# Patient Record
Sex: MALE | Race: WHITE | Employment: FULL TIME
[De-identification: names, ages, dates, MRNs, and addresses within clinical notes are randomized per-mention and may not be internally consistent; named-entity substitution may affect disease eponyms.]

---

## 2017-03-06 ENCOUNTER — OFFICE VISIT (OUTPATIENT)
Dept: FAMILY MEDICINE CLINIC | Facility: CLINIC | Age: 52
End: 2017-03-06

## 2017-03-06 VITALS
BODY MASS INDEX: 44.1 KG/M2 | TEMPERATURE: 98.3 F | DIASTOLIC BLOOD PRESSURE: 64 MMHG | HEART RATE: 98 BPM | WEIGHT: 315 LBS | HEIGHT: 71 IN | SYSTOLIC BLOOD PRESSURE: 132 MMHG

## 2017-03-06 DIAGNOSIS — E66.01 MORBID OBESITY WITH BMI OF 50.0-59.9, ADULT (HCC): ICD-10-CM

## 2017-03-06 DIAGNOSIS — E11.9 TYPE 2 DIABETES MELLITUS WITHOUT COMPLICATION, WITHOUT LONG-TERM CURRENT USE OF INSULIN (HCC): Primary | ICD-10-CM

## 2017-03-06 DIAGNOSIS — J06.9 UPPER RESPIRATORY TRACT INFECTION, UNSPECIFIED TYPE: ICD-10-CM

## 2017-03-06 DIAGNOSIS — I10 ESSENTIAL HYPERTENSION: ICD-10-CM

## 2017-03-06 LAB
GLUCOSE BLD-MCNC: 200 MG/DL
HBA1C MFR BLD: 7.2 %

## 2017-03-06 PROCEDURE — 99214 OFFICE O/P EST MOD 30 MIN: CPT | Performed by: FAMILY MEDICINE

## 2017-03-06 PROCEDURE — 82962 GLUCOSE BLOOD TEST: CPT | Performed by: FAMILY MEDICINE

## 2017-03-06 PROCEDURE — 83036 HEMOGLOBIN GLYCOSYLATED A1C: CPT | Performed by: FAMILY MEDICINE

## 2017-03-06 RX ORDER — FLUTICASONE PROPIONATE 50 MCG
2 SPRAY, SUSPENSION (ML) NASAL DAILY
Qty: 1 BOTTLE | Refills: 0 | Status: SHIPPED | OUTPATIENT
Start: 2017-03-06 | End: 2017-11-27 | Stop reason: ALTCHOICE

## 2017-03-06 RX ORDER — ALBUTEROL SULFATE 90 UG/1
2 AEROSOL, METERED RESPIRATORY (INHALATION) 2 TIMES DAILY
Qty: 1 INHALER | Refills: 1 | Status: SHIPPED | OUTPATIENT
Start: 2017-03-06 | End: 2017-04-10 | Stop reason: ALTCHOICE

## 2017-03-06 ASSESSMENT — ENCOUNTER SYMPTOMS
SHORTNESS OF BREATH: 0
NAUSEA: 0
SORE THROAT: 0
WHEEZING: 1
ABDOMINAL PAIN: 0
COUGH: 1

## 2017-03-06 ASSESSMENT — PATIENT HEALTH QUESTIONNAIRE - PHQ9
2. FEELING DOWN, DEPRESSED OR HOPELESS: 0
SUM OF ALL RESPONSES TO PHQ9 QUESTIONS 1 & 2: 0
SUM OF ALL RESPONSES TO PHQ QUESTIONS 1-9: 0
1. LITTLE INTEREST OR PLEASURE IN DOING THINGS: 0

## 2017-03-10 DIAGNOSIS — I10 UNSPECIFIED ESSENTIAL HYPERTENSION: ICD-10-CM

## 2017-03-10 RX ORDER — HYDROCHLOROTHIAZIDE 25 MG/1
25 TABLET ORAL DAILY
Qty: 30 TABLET | Refills: 2 | Status: SHIPPED | OUTPATIENT
Start: 2017-03-10 | End: 2017-06-19 | Stop reason: SDUPTHER

## 2017-03-10 RX ORDER — LISINOPRIL 5 MG/1
5 TABLET ORAL DAILY
Qty: 30 TABLET | Refills: 2 | Status: SHIPPED | OUTPATIENT
Start: 2017-03-10 | End: 2017-06-19 | Stop reason: SDUPTHER

## 2017-04-10 ENCOUNTER — OFFICE VISIT (OUTPATIENT)
Dept: FAMILY MEDICINE CLINIC | Age: 52
End: 2017-04-10
Payer: MEDICARE

## 2017-04-10 VITALS
SYSTOLIC BLOOD PRESSURE: 130 MMHG | BODY MASS INDEX: 44.1 KG/M2 | DIASTOLIC BLOOD PRESSURE: 72 MMHG | HEART RATE: 84 BPM | HEIGHT: 71 IN | WEIGHT: 315 LBS | TEMPERATURE: 98.3 F

## 2017-04-10 DIAGNOSIS — E66.01 MORBID OBESITY WITH BMI OF 50.0-59.9, ADULT (HCC): ICD-10-CM

## 2017-04-10 DIAGNOSIS — I10 ESSENTIAL HYPERTENSION: ICD-10-CM

## 2017-04-10 DIAGNOSIS — E11.9 TYPE 2 DIABETES MELLITUS WITHOUT COMPLICATION, WITHOUT LONG-TERM CURRENT USE OF INSULIN (HCC): Primary | ICD-10-CM

## 2017-04-10 PROCEDURE — 99213 OFFICE O/P EST LOW 20 MIN: CPT | Performed by: FAMILY MEDICINE

## 2017-04-10 ASSESSMENT — PATIENT HEALTH QUESTIONNAIRE - PHQ9
SUM OF ALL RESPONSES TO PHQ QUESTIONS 1-9: 0
2. FEELING DOWN, DEPRESSED OR HOPELESS: 0
SUM OF ALL RESPONSES TO PHQ9 QUESTIONS 1 & 2: 0
1. LITTLE INTEREST OR PLEASURE IN DOING THINGS: 0

## 2017-04-10 ASSESSMENT — ENCOUNTER SYMPTOMS
VOMITING: 0
ABDOMINAL PAIN: 0
NAUSEA: 0
SHORTNESS OF BREATH: 0
SORE THROAT: 0
CONSTIPATION: 0

## 2017-05-15 ENCOUNTER — OFFICE VISIT (OUTPATIENT)
Dept: FAMILY MEDICINE CLINIC | Age: 52
End: 2017-05-15
Payer: MEDICARE

## 2017-05-15 ENCOUNTER — HOSPITAL ENCOUNTER (OUTPATIENT)
Age: 52
Discharge: HOME | End: 2017-05-15
Payer: MEDICARE

## 2017-05-15 VITALS
WEIGHT: 315 LBS | BODY MASS INDEX: 44.1 KG/M2 | TEMPERATURE: 97.5 F | DIASTOLIC BLOOD PRESSURE: 78 MMHG | HEIGHT: 71 IN | HEART RATE: 86 BPM | SYSTOLIC BLOOD PRESSURE: 136 MMHG

## 2017-05-15 DIAGNOSIS — I10 ESSENTIAL HYPERTENSION: ICD-10-CM

## 2017-05-15 DIAGNOSIS — E66.01 MORBID OBESITY WITH BMI OF 50.0-59.9, ADULT (HCC): ICD-10-CM

## 2017-05-15 DIAGNOSIS — E11.9 TYPE 2 DIABETES MELLITUS WITHOUT COMPLICATION, WITHOUT LONG-TERM CURRENT USE OF INSULIN (HCC): Primary | ICD-10-CM

## 2017-05-15 LAB
ANION GAP SERPL CALCULATED.3IONS-SCNC: 14 MMOL/L (ref 9–17)
BUN BLDV-MCNC: 34 MG/DL (ref 6–20)
BUN/CREAT BLD: ABNORMAL (ref 9–20)
CALCIUM SERPL-MCNC: 9.7 MG/DL (ref 8.6–10.4)
CHLORIDE BLD-SCNC: 100 MMOL/L (ref 98–107)
CHOLESTEROL/HDL RATIO: 5.8
CHOLESTEROL: 181 MG/DL
CO2: 21 MMOL/L (ref 20–31)
CREAT SERPL-MCNC: 1.27 MG/DL (ref 0.7–1.2)
GFR AFRICAN AMERICAN: >60 ML/MIN
GFR NON-AFRICAN AMERICAN: >60 ML/MIN
GFR SERPL CREATININE-BSD FRML MDRD: ABNORMAL ML/MIN/{1.73_M2}
GFR SERPL CREATININE-BSD FRML MDRD: ABNORMAL ML/MIN/{1.73_M2}
GLUCOSE BLD-MCNC: 186 MG/DL (ref 70–99)
HDLC SERPL-MCNC: 31 MG/DL
LDL CHOLESTEROL: 113 MG/DL (ref 0–130)
POTASSIUM SERPL-SCNC: 4.6 MMOL/L (ref 3.7–5.3)
SODIUM BLD-SCNC: 135 MMOL/L (ref 135–144)
TRIGL SERPL-MCNC: 186 MG/DL
VLDLC SERPL CALC-MCNC: ABNORMAL MG/DL (ref 1–30)

## 2017-05-15 PROCEDURE — 80048 BASIC METABOLIC PNL TOTAL CA: CPT

## 2017-05-15 PROCEDURE — 99213 OFFICE O/P EST LOW 20 MIN: CPT | Performed by: FAMILY MEDICINE

## 2017-05-15 PROCEDURE — 80061 LIPID PANEL: CPT

## 2017-05-15 PROCEDURE — 36415 COLL VENOUS BLD VENIPUNCTURE: CPT

## 2017-05-15 ASSESSMENT — PATIENT HEALTH QUESTIONNAIRE - PHQ9
SUM OF ALL RESPONSES TO PHQ9 QUESTIONS 1 & 2: 0
1. LITTLE INTEREST OR PLEASURE IN DOING THINGS: 0
SUM OF ALL RESPONSES TO PHQ QUESTIONS 1-9: 0
2. FEELING DOWN, DEPRESSED OR HOPELESS: 0

## 2017-05-15 ASSESSMENT — ENCOUNTER SYMPTOMS
NAUSEA: 0
SORE THROAT: 0
ABDOMINAL PAIN: 0
CONSTIPATION: 0
SHORTNESS OF BREATH: 0

## 2017-06-19 ENCOUNTER — HOSPITAL ENCOUNTER (OUTPATIENT)
Age: 52
Setting detail: SPECIMEN
Discharge: HOME | End: 2017-06-19
Payer: MEDICARE

## 2017-06-19 ENCOUNTER — OFFICE VISIT (OUTPATIENT)
Dept: FAMILY MEDICINE CLINIC | Age: 52
End: 2017-06-19
Payer: MEDICARE

## 2017-06-19 VITALS
OXYGEN SATURATION: 97 % | SYSTOLIC BLOOD PRESSURE: 138 MMHG | HEART RATE: 90 BPM | RESPIRATION RATE: 18 BRPM | BODY MASS INDEX: 44.1 KG/M2 | DIASTOLIC BLOOD PRESSURE: 70 MMHG | HEIGHT: 71 IN | TEMPERATURE: 98.2 F | WEIGHT: 315 LBS

## 2017-06-19 DIAGNOSIS — E11.9 TYPE 2 DIABETES MELLITUS WITHOUT COMPLICATION, WITHOUT LONG-TERM CURRENT USE OF INSULIN (HCC): Primary | ICD-10-CM

## 2017-06-19 DIAGNOSIS — E78.6 LOW HDL (UNDER 40): ICD-10-CM

## 2017-06-19 DIAGNOSIS — I10 ESSENTIAL HYPERTENSION: ICD-10-CM

## 2017-06-19 DIAGNOSIS — E11.9 TYPE 2 DIABETES MELLITUS WITHOUT COMPLICATION, WITHOUT LONG-TERM CURRENT USE OF INSULIN (HCC): ICD-10-CM

## 2017-06-19 DIAGNOSIS — E66.01 MORBID OBESITY WITH BMI OF 45.0-49.9, ADULT (HCC): ICD-10-CM

## 2017-06-19 LAB
CREATININE URINE: 211.7 MG/DL (ref 39–259)
MICROALBUMIN/CREAT 24H UR: 15 MG/L
MICROALBUMIN/CREAT UR-RTO: 7 MCG/MG CREAT

## 2017-06-19 PROCEDURE — 99213 OFFICE O/P EST LOW 20 MIN: CPT | Performed by: FAMILY MEDICINE

## 2017-06-19 RX ORDER — ALBUTEROL SULFATE 90 UG/1
2 AEROSOL, METERED RESPIRATORY (INHALATION) EVERY 6 HOURS PRN
COMMUNITY
End: 2021-06-11 | Stop reason: SDUPTHER

## 2017-06-19 RX ORDER — LISINOPRIL 5 MG/1
5 TABLET ORAL DAILY
Qty: 30 TABLET | Refills: 2 | Status: SHIPPED | OUTPATIENT
Start: 2017-06-19 | End: 2017-12-18 | Stop reason: SDUPTHER

## 2017-06-19 RX ORDER — HYDROCHLOROTHIAZIDE 25 MG/1
25 TABLET ORAL DAILY
Qty: 30 TABLET | Refills: 2 | Status: SHIPPED | OUTPATIENT
Start: 2017-06-19 | End: 2018-01-08 | Stop reason: SDUPTHER

## 2017-06-19 ASSESSMENT — ENCOUNTER SYMPTOMS
SORE THROAT: 0
CONSTIPATION: 0
ABDOMINAL PAIN: 0
NAUSEA: 0
SHORTNESS OF BREATH: 0

## 2017-07-10 ENCOUNTER — TELEPHONE (OUTPATIENT)
Dept: FAMILY MEDICINE CLINIC | Age: 52
End: 2017-07-10

## 2017-08-07 ENCOUNTER — OFFICE VISIT (OUTPATIENT)
Dept: FAMILY MEDICINE CLINIC | Age: 52
End: 2017-08-07
Payer: MEDICARE

## 2017-08-07 ENCOUNTER — TELEPHONE (OUTPATIENT)
Dept: FAMILY MEDICINE CLINIC | Age: 52
End: 2017-08-07

## 2017-08-07 VITALS
DIASTOLIC BLOOD PRESSURE: 72 MMHG | WEIGHT: 315 LBS | BODY MASS INDEX: 42.66 KG/M2 | HEIGHT: 72 IN | TEMPERATURE: 98.3 F | HEART RATE: 90 BPM | SYSTOLIC BLOOD PRESSURE: 120 MMHG | OXYGEN SATURATION: 98 %

## 2017-08-07 DIAGNOSIS — Z23 NEED FOR PNEUMOCOCCAL VACCINATION: ICD-10-CM

## 2017-08-07 DIAGNOSIS — E66.01 MORBID OBESITY WITH BMI OF 45.0-49.9, ADULT (HCC): ICD-10-CM

## 2017-08-07 DIAGNOSIS — E11.9 TYPE 2 DIABETES MELLITUS WITHOUT COMPLICATION, WITHOUT LONG-TERM CURRENT USE OF INSULIN (HCC): Primary | ICD-10-CM

## 2017-08-07 DIAGNOSIS — I10 ESSENTIAL HYPERTENSION: ICD-10-CM

## 2017-08-07 DIAGNOSIS — E78.6 LOW HDL (UNDER 40): ICD-10-CM

## 2017-08-07 LAB — HBA1C MFR BLD: 6.8 %

## 2017-08-07 PROCEDURE — 83036 HEMOGLOBIN GLYCOSYLATED A1C: CPT | Performed by: FAMILY MEDICINE

## 2017-08-07 PROCEDURE — 90732 PPSV23 VACC 2 YRS+ SUBQ/IM: CPT | Performed by: FAMILY MEDICINE

## 2017-08-07 PROCEDURE — 99214 OFFICE O/P EST MOD 30 MIN: CPT | Performed by: FAMILY MEDICINE

## 2017-08-07 PROCEDURE — 90471 IMMUNIZATION ADMIN: CPT | Performed by: FAMILY MEDICINE

## 2017-08-07 RX ORDER — ATORVASTATIN CALCIUM 20 MG/1
20 TABLET, FILM COATED ORAL DAILY
Qty: 30 TABLET | Refills: 3 | Status: SHIPPED | OUTPATIENT
Start: 2017-08-07 | End: 2017-12-18 | Stop reason: SDUPTHER

## 2017-08-07 ASSESSMENT — ENCOUNTER SYMPTOMS
NAUSEA: 0
SHORTNESS OF BREATH: 0
CONSTIPATION: 0
ABDOMINAL PAIN: 0
SORE THROAT: 0

## 2017-08-15 ENCOUNTER — TELEPHONE (OUTPATIENT)
Dept: FAMILY MEDICINE CLINIC | Age: 52
End: 2017-08-15

## 2017-08-21 DIAGNOSIS — R53.83 FATIGUE, UNSPECIFIED TYPE: Primary | ICD-10-CM

## 2017-08-24 ENCOUNTER — HOSPITAL ENCOUNTER (OUTPATIENT)
Age: 52
Discharge: HOME | End: 2017-08-24
Payer: MEDICARE

## 2017-08-24 DIAGNOSIS — R53.83 FATIGUE, UNSPECIFIED TYPE: ICD-10-CM

## 2017-08-24 LAB
ABSOLUTE EOS #: 0.2 K/UL (ref 0–0.4)
ABSOLUTE LYMPH #: 3.2 K/UL (ref 1–4.8)
ABSOLUTE MONO #: 0.8 K/UL (ref 0.1–1.3)
BASOPHILS # BLD: 0 %
BASOPHILS ABSOLUTE: 0.1 K/UL (ref 0–0.2)
DIFFERENTIAL TYPE: ABNORMAL
EOSINOPHILS RELATIVE PERCENT: 2 %
HCT VFR BLD CALC: 44 % (ref 41–53)
HEMOGLOBIN: 14.9 G/DL (ref 13.5–17.5)
LYMPHOCYTES # BLD: 25 %
MCH RBC QN AUTO: 30.2 PG (ref 26–34)
MCHC RBC AUTO-ENTMCNC: 33.8 G/DL (ref 31–37)
MCV RBC AUTO: 89.4 FL (ref 80–100)
MONOCYTES # BLD: 6 %
PDW BLD-RTO: 14.5 % (ref 11.5–14.9)
PLATELET # BLD: 297 K/UL (ref 150–450)
PLATELET ESTIMATE: ABNORMAL
PMV BLD AUTO: 9.1 FL (ref 6–12)
RBC # BLD: 4.92 M/UL (ref 4.5–5.9)
RBC # BLD: ABNORMAL 10*6/UL
SEG NEUTROPHILS: 67 %
SEGMENTED NEUTROPHILS ABSOLUTE COUNT: 8.7 K/UL (ref 1.3–9.1)
WBC # BLD: 13.1 K/UL (ref 3.5–11)
WBC # BLD: ABNORMAL 10*3/UL

## 2017-08-24 PROCEDURE — 85025 COMPLETE CBC W/AUTO DIFF WBC: CPT

## 2017-08-24 PROCEDURE — 36415 COLL VENOUS BLD VENIPUNCTURE: CPT

## 2017-08-29 ENCOUNTER — TELEPHONE (OUTPATIENT)
Dept: FAMILY MEDICINE CLINIC | Age: 52
End: 2017-08-29

## 2017-08-29 DIAGNOSIS — D72.829 LEUKOCYTOSIS, UNSPECIFIED TYPE: Primary | ICD-10-CM

## 2017-08-30 ENCOUNTER — TELEPHONE (OUTPATIENT)
Dept: INFUSION THERAPY | Age: 52
End: 2017-08-30

## 2017-10-16 ENCOUNTER — INITIAL CONSULT (OUTPATIENT)
Dept: ONCOLOGY | Age: 52
End: 2017-10-16
Payer: MEDICARE

## 2017-10-16 VITALS
SYSTOLIC BLOOD PRESSURE: 179 MMHG | RESPIRATION RATE: 20 BRPM | DIASTOLIC BLOOD PRESSURE: 87 MMHG | HEIGHT: 72 IN | TEMPERATURE: 98.2 F | HEART RATE: 81 BPM | WEIGHT: 315 LBS | BODY MASS INDEX: 42.66 KG/M2

## 2017-10-16 DIAGNOSIS — E66.01 MORBID OBESITY WITH BMI OF 45.0-49.9, ADULT (HCC): ICD-10-CM

## 2017-10-16 DIAGNOSIS — D68.59 HYPERCOAGULABLE STATE (HCC): Primary | ICD-10-CM

## 2017-10-16 DIAGNOSIS — Z72.0 TOBACCO ABUSE: ICD-10-CM

## 2017-10-16 DIAGNOSIS — Z71.6 TOBACCO ABUSE COUNSELING: ICD-10-CM

## 2017-10-16 PROCEDURE — 99201 HC NEW PT, E/M LEVEL 1: CPT

## 2017-10-16 PROCEDURE — 99244 OFF/OP CNSLTJ NEW/EST MOD 40: CPT | Performed by: INTERNAL MEDICINE

## 2017-10-16 NOTE — LETTER
· Eyes: No blurred vision, eye pain or double vision. · Ears: No hearing problems or drainage. No tinnitus. · Throat: No sore throat, problems with swallowing or dysphagia. · Respiratory: No cough, sputum or hemoptysis. No shortness of breath. No pleuritic chest pain. · Cardiovascular: No chest pain, orthopnea or PND. No lower extremity edema. No palpitation. · Gastrointestinal: No problems with swallowing. No abdominal pain or bloating. No nausea or vomiting. No diarrhea or constipation. No GI bleeding. · Genitourinary: No dysuria, hematuria, frequency or urgency. · Musculoskeletal: No muscle aches or pains. No limitation of movement. No back pain. No gait disturbance, No joint complaints. · Dermatologic: No skin rashes or pruritus. No skin lesions or discolorations. · Psychiatric: No depression, anxiety, or stress or signs of schizophrenia. No change in mood or affect. · Hematologic: No history of bleeding tendency. No bruises or ecchymosis. No history of clotting problems. · Infectious disease: No fever, chills or frequent infections. · Endocrine: No problems with opacity. No polydipsia or polyuria. No temperature intolerance. · Neurologic: No headaches or dizziness. No weakness or numbness of the extremities. No changes in balance, coordination,  memory, mentation, behavior. · Allergic/Immunologic: No nasal congestion or hives. No repeated infections. PHYSICAL EXAM:  The patient is not in acute distress. Vital signs: Blood pressure (!) 179/87, pulse 81, temperature 98.2 °F (36.8 °C), temperature source Oral, resp. rate 20, height 6' (1.829 m), weight (!) 369 lb 1.6 oz (167.4 kg). HEENT:  Eyes are normal. Ears, nose and throat are normal.  Neck: Supple. No lymph node enlargement. No thyroid enlargement. Trachea is centrally located. Chest:  Clear to auscultation. No wheezes or crepitations. Heart: Regular sinus rhythm. Abdomen: Soft, nontender. While intending to generate a document that actually reflects the content of the visit, the document can still have some errors including those of syntax and sound a like substitutions which may escape proof reading. It such instances, actual meaning can be extrapolated by contextual diversion.

## 2017-10-16 NOTE — PROGRESS NOTES
_               Mr. Yossi Dickson is a very pleasant 47 y.o. male with history of chronic tobacco use and morbid obesity. He has sleep apnea, hypertension and a repeat this. The patient was noted to have easy clotting or blood withdraw and after bleeding. He was previously evaluated for DVT and PE which were negative. Patient denies any acute thrombosis events. He had no history of DVT or PE. He had no stroke or cardiac events. He had family history of multiple cardiac events. No other hematologic disorders in the family. Patient has no other symptoms at the present time. He denies any chest pain or shortness breath. No hemoptysis. No other complaints. PAST MEDICAL HISTORY: has a past medical history of Anesthesia; Diabetes mellitus ([facility] [address] Hypertension; Sleep apnea; Umbilical hernia; and Wears dentures. PAST SURGICAL HISTORY: has a past surgical history that includes Umbilical hernia repair (5/24/16). CURRENT MEDICATIONS:  has a current medication list which includes the following prescription(s): atorvastatin, albuterol sulfate hfa, metformin, lisinopril, hydrochlorothiazide, and fluticasone. ALLERGIES:  is allergic to [place] Jessi and [place] [sitagliptin]. FAMILY HISTORY: Negative for any hematological or oncological conditions. SOCIAL HISTORY:  reports that he has been smoking. He has a 45.00 pack-year smoking history. He has never used smokeless tobacco. He reports that he does not drink alcohol or use drugs. REVIEW OF SYSTEMS:     · General: No weakness or fatigue. No unanticipated weight loss or decreased appetite. No fever or chills. · Eyes: No blurred vision, eye pain or double vision. · Ears: No hearing problems or drainage. No tinnitus. · Throat: No sore throat, problems with swallowing or dysphagia. · Respiratory: No cough, sputum or hemoptysis. No shortness of breath.  No pleuritic chest pain.     · Cardiovascular: No chest pain, orthopnea or PND. No lower extremity edema. No palpitation. · Gastrointestinal: No problems with swallowing. No abdominal pain or bloating. No nausea or vomiting. No diarrhea or constipation. No GI bleeding. · Genitourinary: No dysuria, hematuria, frequency or urgency. · Musculoskeletal: No muscle aches or pains. No limitation of movement. No back pain. No gait disturbance, No joint complaints. · Dermatologic: No skin rashes or pruritus. No skin lesions or discolorations. · Psychiatric: No depression, anxiety, or stress or signs of schizophrenia. No change in mood or affect. · Hematologic: No history of bleeding tendency. No bruises or ecchymosis. No history of clotting problems. · Infectious disease: No fever, chills or frequent infections. · Endocrine: No problems with opacity. No polydipsia or polyuria. No temperature intolerance. · Neurologic: No headaches or dizziness. No weakness or numbness of the extremities. No changes in balance, coordination,  memory, mentation, behavior. · Allergic/Immunologic: No nasal congestion or hives. No repeated infections. PHYSICAL EXAM:  The patient is not in acute distress. Vital signs: Blood pressure (!) 179/87, pulse 81, temperature 98.2 °F (36.8 °C), temperature source Oral, resp. rate 20, height 6' (1.829 m), weight (!) 369 lb 1.6 oz (167.4 kg). HEENT:  Eyes are normal. Ears, nose and throat are normal.  Neck: Supple. No lymph node enlargement. No thyroid enlargement. Trachea is centrally located. Chest:  Clear to auscultation. No wheezes or crepitations. Heart: Regular sinus rhythm. Abdomen: Soft, nontender. No hepatosplenomegaly. No masses. Extremities:  With no edema. Lymph Nodes:  No cervical, axillary or inguinal lymph node enlargement. Neurologic:  Conscious and oriented. No focal neurological deficits. Psychosocial: No depression, anxiety or stress.  Skin: No rashes, bruises or ecchymoses. Review of Diagnostic data:   Lab Results   Component Value Date    WBC 13.1 (H) 08/24/2017    HGB 14.9 08/24/2017    HCT 44.0 08/24/2017    MCV 89.4 08/24/2017     08/24/2017         IMPRESSION:   Possible hypercoagulable state  Morbid obesity  Chronic tobacco abuse  Mild leukocytosis likely secondary to smoking. PLAN: I reviewed the labs available to me and discussed with the patient. For more than 60 minutes of face to face discussion, I explained to the patient the nature of this hematologic problem. I explained the significance of these abnormalities in layman language. At the present time obviously patient did not have any definitive episodes of thrombosis however with frequent observations of easy clotting I will investigate him further to rule out hypercoagulable state and I will check the blood viscosity as well and serum protein immunofixation. If these show any abnormalities further workup will be done. Patient was counseled about importance of quitting smoking and weight control. Considering his body habitus and his risk of cardiac events and stroke we recommend for the patient to be on aspirin even with normal hypercoagulable workup. We will see him after getting the results of these tests for further management. Patient's questions were answered to the best of his satisfaction and he verbalized full understanding and agreement.

## 2017-10-23 ENCOUNTER — HOSPITAL ENCOUNTER (OUTPATIENT)
Age: 52
Discharge: HOME | End: 2017-10-23
Payer: MEDICARE

## 2017-10-23 LAB
ABSOLUTE EOS #: 0.3 K/UL (ref 0–0.4)
ABSOLUTE IMMATURE GRANULOCYTE: NORMAL K/UL (ref 0–0.3)
ABSOLUTE LYMPH #: 2.3 K/UL (ref 1–4.8)
ABSOLUTE MONO #: 0.6 K/UL (ref 0.1–1.3)
BASOPHILS # BLD: 1 %
BASOPHILS ABSOLUTE: 0.1 K/UL (ref 0–0.2)
CARBOXYHEMOGLOBIN: 6 %
DIFFERENTIAL TYPE: NORMAL
EOSINOPHILS RELATIVE PERCENT: 3 %
HCT VFR BLD CALC: 46.1 % (ref 41–53)
HEMOGLOBIN: 15.5 G/DL (ref 13.5–17.5)
HOMOCYSTEINE: 29.8 UMOL/L
IMMATURE GRANULOCYTES: NORMAL %
LYMPHOCYTES # BLD: 21 %
MCH RBC QN AUTO: 29.8 PG (ref 26–34)
MCHC RBC AUTO-ENTMCNC: 33.7 G/DL (ref 31–37)
MCV RBC AUTO: 88.6 FL (ref 80–100)
MONOCYTES # BLD: 5 %
PDW BLD-RTO: 14.7 % (ref 11.5–14.9)
PLATELET # BLD: 241 K/UL (ref 150–450)
PLATELET ESTIMATE: NORMAL
PMV BLD AUTO: 8.6 FL (ref 6–12)
RBC # BLD: 5.2 M/UL (ref 4.5–5.9)
RBC # BLD: NORMAL 10*6/UL
SEG NEUTROPHILS: 70 %
SEGMENTED NEUTROPHILS ABSOLUTE COUNT: 7.7 K/UL (ref 1.3–9.1)
WBC # BLD: 11 K/UL (ref 3.5–11)
WBC # BLD: NORMAL 10*3/UL

## 2017-10-23 PROCEDURE — 86147 CARDIOLIPIN ANTIBODY EA IG: CPT

## 2017-10-23 PROCEDURE — 85306 CLOT INHIBIT PROT S FREE: CPT

## 2017-10-23 PROCEDURE — 85303 CLOT INHIBIT PROT C ACTIVITY: CPT

## 2017-10-23 PROCEDURE — 82375 ASSAY CARBOXYHB QUANT: CPT

## 2017-10-23 PROCEDURE — 86334 IMMUNOFIX E-PHORESIS SERUM: CPT

## 2017-10-23 PROCEDURE — 81241 F5 GENE: CPT

## 2017-10-23 PROCEDURE — 81240 F2 GENE: CPT

## 2017-10-23 PROCEDURE — 85025 COMPLETE CBC W/AUTO DIFF WBC: CPT

## 2017-10-23 PROCEDURE — 83090 ASSAY OF HOMOCYSTEINE: CPT

## 2017-10-23 PROCEDURE — 85810 BLOOD VISCOSITY EXAMINATION: CPT

## 2017-10-23 PROCEDURE — 36415 COLL VENOUS BLD VENIPUNCTURE: CPT

## 2017-10-24 LAB
PROTEIN C ACTIVITY: 123 %
PROTEIN S ACTIVITY: >130 % (ref 77–116)

## 2017-10-26 LAB
ANTICARDIOLIPIN IGA ANTIBODY: 1.4 APU
ANTICARDIOLIPIN IGG ANTIBODY: 19.6 GPU
CARDIOLIPIN AB IGM: 10.2 MPU
FACTOR V LEIDEN MUTATION: NORMAL
PATHOLOGIST: NORMAL
PROTHROMBIN G20210A MUTATION: NORMAL
SERUM IFX INTERP: NORMAL
VISCOSITY, SERUM: 1.68 CP (ref 1.1–1.8)

## 2017-11-27 ENCOUNTER — OFFICE VISIT (OUTPATIENT)
Dept: FAMILY MEDICINE CLINIC | Age: 52
End: 2017-11-27
Payer: MEDICARE

## 2017-11-27 ENCOUNTER — OFFICE VISIT (OUTPATIENT)
Dept: ONCOLOGY | Age: 52
End: 2017-11-27
Payer: MEDICARE

## 2017-11-27 VITALS
HEART RATE: 78 BPM | WEIGHT: 315 LBS | RESPIRATION RATE: 18 BRPM | TEMPERATURE: 98 F | DIASTOLIC BLOOD PRESSURE: 74 MMHG | BODY MASS INDEX: 50.26 KG/M2 | SYSTOLIC BLOOD PRESSURE: 141 MMHG

## 2017-11-27 VITALS
HEART RATE: 90 BPM | WEIGHT: 315 LBS | BODY MASS INDEX: 42.66 KG/M2 | HEIGHT: 72 IN | DIASTOLIC BLOOD PRESSURE: 60 MMHG | OXYGEN SATURATION: 98 % | SYSTOLIC BLOOD PRESSURE: 128 MMHG | TEMPERATURE: 98 F

## 2017-11-27 DIAGNOSIS — I10 ESSENTIAL HYPERTENSION: ICD-10-CM

## 2017-11-27 DIAGNOSIS — E11.9 TYPE 2 DIABETES MELLITUS WITHOUT COMPLICATION, WITHOUT LONG-TERM CURRENT USE OF INSULIN (HCC): Primary | ICD-10-CM

## 2017-11-27 DIAGNOSIS — E72.11 HYPERHOMOCYSTEINEMIA (HCC): Primary | ICD-10-CM

## 2017-11-27 DIAGNOSIS — E66.01 MORBID OBESITY WITH BODY MASS INDEX (BMI) OF 50.0 TO 59.9 IN ADULT (HCC): ICD-10-CM

## 2017-11-27 PROCEDURE — G8417 CALC BMI ABV UP PARAM F/U: HCPCS | Performed by: FAMILY MEDICINE

## 2017-11-27 PROCEDURE — 99214 OFFICE O/P EST MOD 30 MIN: CPT | Performed by: INTERNAL MEDICINE

## 2017-11-27 PROCEDURE — 3017F COLORECTAL CA SCREEN DOC REV: CPT | Performed by: FAMILY MEDICINE

## 2017-11-27 PROCEDURE — G8484 FLU IMMUNIZE NO ADMIN: HCPCS | Performed by: INTERNAL MEDICINE

## 2017-11-27 PROCEDURE — 4004F PT TOBACCO SCREEN RCVD TLK: CPT | Performed by: INTERNAL MEDICINE

## 2017-11-27 PROCEDURE — 3017F COLORECTAL CA SCREEN DOC REV: CPT | Performed by: INTERNAL MEDICINE

## 2017-11-27 PROCEDURE — G8427 DOCREV CUR MEDS BY ELIG CLIN: HCPCS | Performed by: INTERNAL MEDICINE

## 2017-11-27 PROCEDURE — G8427 DOCREV CUR MEDS BY ELIG CLIN: HCPCS | Performed by: FAMILY MEDICINE

## 2017-11-27 PROCEDURE — 4004F PT TOBACCO SCREEN RCVD TLK: CPT | Performed by: FAMILY MEDICINE

## 2017-11-27 PROCEDURE — 3044F HG A1C LEVEL LT 7.0%: CPT | Performed by: FAMILY MEDICINE

## 2017-11-27 PROCEDURE — 99213 OFFICE O/P EST LOW 20 MIN: CPT | Performed by: FAMILY MEDICINE

## 2017-11-27 PROCEDURE — G8484 FLU IMMUNIZE NO ADMIN: HCPCS | Performed by: FAMILY MEDICINE

## 2017-11-27 PROCEDURE — G8417 CALC BMI ABV UP PARAM F/U: HCPCS | Performed by: INTERNAL MEDICINE

## 2017-11-27 PROCEDURE — 99211 OFF/OP EST MAY X REQ PHY/QHP: CPT

## 2017-11-27 RX ORDER — FOLIC ACID 1 MG/1
1 TABLET ORAL DAILY
Qty: 30 TABLET | Refills: 5 | Status: SHIPPED | OUTPATIENT
Start: 2017-11-27 | End: 2018-08-23 | Stop reason: ALTCHOICE

## 2017-11-27 ASSESSMENT — ENCOUNTER SYMPTOMS
SHORTNESS OF BREATH: 0
ABDOMINAL PAIN: 0
CONSTIPATION: 0
NAUSEA: 0
BACK PAIN: 0
COUGH: 0
SORE THROAT: 0

## 2017-11-27 NOTE — PROGRESS NOTES
Subjective:      Patient ID: Senthil Sales is a 47 y.o. male. Chronic Disease Visit Information    BP Readings from Last 3 Encounters:   11/27/17 (!) 138/58   11/27/17 (!) 141/74   10/16/17 (!) 179/87          Hemoglobin A1C (%)   Date Value   08/07/2017 6.8   03/06/2017 7.2   05/31/2016 7.7     Microalb/Crt. Ratio (mcg/mg creat)   Date Value   06/19/2017 7     LDL Cholesterol (mg/dL)   Date Value   05/15/2017 113     HDL (mg/dL)   Date Value   05/15/2017 31 (L)     BUN (mg/dL)   Date Value   05/15/2017 34 (H)     CREATININE (mg/dL)   Date Value   05/15/2017 1.27 (H)     Glucose (mg/dL)   Date Value   05/15/2017 186 (H)            Have you changed or started any medications since your last visit including any over-the-counter medicines, vitamins, or herbal medicines? yes - Folic Acid   Are you having any side effects from any of your medications? -  no  Have you stopped taking any of your medications? Is so, why? -  yes - Flonase    Have you seen any other physician or provider since your last visit? Yes - Records Obtained  Have you had any other diagnostic tests since your last visit? Yes - Records Obtained  Have you been seen in the emergency room and/or had an admission to a hospital since we last saw you? No  Have you had your annual diabetic retinal (eye) exam? Yes - 5/15/17  Have you had your routine dental cleaning in the past 6 months? no    Have you activated your [employer] account? If not, what are your barriers?  Yes     Patient Care Team:  Joseph Hyman MD as PCP - General (Family Medicine)         Medical History Review  Past Medical, Family, and Social History reviewed and does contribute to the patient presenting condition    Health Maintenance   Topic Date Due    HIV screen  09/12/1981    DTaP/Tdap/Td vaccine (1 - Tdap) 09/12/1985    Colon cancer screen colonoscopy  09/12/2016    Flu vaccine (1) 09/01/2017    Diabetic foot exam  03/06/2018    Diabetic retinal exam  05/15/2018    Lipid screen  05/15/2018    Diabetic microalbuminuria test  06/19/2018    Diabetic hemoglobin A1C test  08/07/2018    Pneumococcal med risk  Completed     HPI   is 69-year-old male is seen in the office today for follow-up for his  diabetes and hypertension he has not checked his blood sugar for  1 month and  not  following his diet is on metformin twice a day. Blood pressure is controlled. He is on Zestril and HCTZ denies of any chest pain shortness of breath headache or dizziness is also on Lipitor  He  has a hypercoagulable  state and he is seeing an hematologists is on folic acid  Review of Systems   Constitutional: Negative for appetite change. HENT: Negative for ear pain, sneezing and sore throat. Eyes: Negative for visual disturbance. Respiratory: Negative for cough and shortness of breath. Cardiovascular: Negative for chest pain and leg swelling. Gastrointestinal: Negative for abdominal pain, constipation and nausea. Endocrine: Negative for polydipsia and polyuria. Genitourinary: Negative for frequency and urgency. Musculoskeletal: Negative for arthralgias and back pain. Skin: Negative for rash. Neurological: Negative for dizziness, syncope and headaches. Objective:   Physical Exam   Constitutional: He is oriented to person, place, and time. He appears well-developed and well-nourished. /60   Pulse 90   Temp 98 °F (36.7 °C) (Oral)   Ht 6' (1.829 m)   Wt (!) 370 lb 8 oz (168.1 kg)   SpO2 98%   BMI 50.25 kg/m²    Morbid obese male   HENT:   Head: Normocephalic. Mouth/Throat: Oropharynx is clear and moist.   Cardiovascular: Normal rate and regular rhythm. Pulmonary/Chest: Breath sounds normal.   Abdominal: Soft. There is no tenderness. Musculoskeletal: He exhibits no edema. Lymphadenopathy:     He has no cervical adenopathy. Neurological: He is alert and oriented to person, place, and time. Nursing note and vitals reviewed. Assessment:      1.  Type 2 diabetes mellitus without complication, without long-term current use of insulin ([address]   on metformin   2. Essential hypertension   controlled   3. Morbid obesity with body mass index (BMI) of 50.0 to 59.9 in adult [facility])   stable           Plan:       No orders of the defined types were placed in this encounter. No orders of the defined types were placed in this encounter. Return in about 1 month (around 12/27/2017) for diabetes.     Continue current medications reviewed from the chart    Needs to check his blood sugar regularly   diet exercise and weight loss discussed with the patient

## 2017-11-27 NOTE — PATIENT INSTRUCTIONS
Patient Education   Patient Education        Stopping Smoking: Care Instructions  Your Care Instructions  Cigarette smokers crave the nicotine in cigarettes. Giving it up is much harder than simply changing a habit. Your body has to stop craving the nicotine. It is hard to quit, but you can do it. There are many tools that people use to quit smoking. You may find that combining tools works best for you. There are several steps to quitting. First you get ready to quit. Then you get support to help you. After that, you learn new skills and behaviors to become a nonsmoker. For many people, a necessary step is getting and using medicine. Your doctor will help you set up the plan that best meets your needs. You may want to attend a smoking cessation program to help you quit smoking. When you choose a program, look for one that has proven success. Ask your doctor for ideas. You will greatly increase your chances of success if you take medicine as well as get counseling or join a cessation program.  Some of the changes you feel when you first quit tobacco are uncomfortable. Your body will miss the nicotine at first, and you may feel short-tempered and grumpy. You may have trouble sleeping or concentrating. Medicine can help you deal with these symptoms. You may struggle with changing your smoking habits and rituals. The last step is the tricky one: Be prepared for the smoking urge to continue for a time. This is a lot to deal with, but keep at it. You will feel better. Follow-up care is a key part of your treatment and safety. Be sure to make and go to all appointments, and call your doctor if you are having problems. Its also a good idea to know your test results and keep a list of the medicines you take. How can you care for yourself at home? · Ask your family, friends, and coworkers for support. You have a better chance of quitting if you have help and support.   · Join a support group, such as Nicotine Anonymous,

## 2017-11-27 NOTE — PROGRESS NOTES
98 °F (36.7 °C), temperature source Oral, resp. rate 18, weight (!) 370 lb 9.6 oz (168.1 kg). HEENT:  Eyes are normal. Ears, nose and throat are normal.  Neck: Supple. No lymph node enlargement. No thyroid enlargement. Trachea is centrally located. Chest:  Clear to auscultation. No wheezes or crepitations. Heart: Regular sinus rhythm. Abdomen: Soft, nontender. No hepatosplenomegaly. No masses. Extremities:  With no edema. Lymph Nodes:  No cervical, axillary or inguinal lymph node enlargement. Neurologic:  Conscious and oriented. No focal neurological deficits. Psychosocial: No depression, anxiety or stress. Skin: No rashes, bruises or ecchymoses. Review of Diagnostic data:   Lab Results   Component Value Date    WBC 11.0 10/23/2017    HGB 15.5 10/23/2017    HCT 46.1 10/23/2017    MCV 88.6 10/23/2017     10/23/2017     Homocysteine level is 29.8 (normal below 15)    IMPRESSION:   Hyper homocystinemia. Morbid obesity  Chronic tobacco abuse  Mild leukocytosis likely secondary to smoking. Resolved. PLAN: I reviewed the labs as above and discussed with the patient. I explained to the patient the nature of this hematologic problem. I explained the significance of these abnormalities in layman language. Hypercoagulability workup is negative except for elevated homocysteine level. I explained that there is association between cardiovascular diseases and cerebrovascular disease and elevated homocysteine level. So we will prescribe folic acid and vitamin B and we will continue on baby aspirin. We will repeat the homocysteine level in about 3 months we'll make further recommendations based on the results. Patient was counseled about importance of quitting smoking and weight control. Considering his body habitus and his risk of cardiac events and stroke we recommend for the patient to be on aspirin even with normal hypercoagulable workup.   Patient's questions were answered to the best of his satisfaction and he verbalized full understanding and agreement.

## 2017-11-27 NOTE — LETTER
_    Jaron Tapia MD    11/27/2017     Marquis Darin MD   [address] [facility] [id number]    Dear Dr Andi Lennox: Thank you for referring [facility], 1965, to me for evaluation. Below are the relevant portions of my assessment and plan of care. _   DIAGNOSIS:       Hyper homocystinemia. Morbid obesity  Chronic tobacco abuse  Mild leukocytosis likely secondary to smoking. Resolved. CURRENT THERAPY:         Patient seen discuss results of recent labs and further measuring up plans  Start folic acid and vitamin B  Baby aspirin    BRIEF CASE HISTORY:      Mr. MAGO BELLO is a very pleasant 47 y.o. male with history of chronic tobacco use and morbid obesity. He has sleep apnea, hypertension and a repeat this. The patient was noted to have easy clotting or blood withdraw and after bleeding. He was previously evaluated for DVT and PE which were negative. Patient denies any acute thrombosis events. He had no history of DVT or PE. He had no stroke or cardiac events. He had family history of multiple cardiac events. No other hematologic disorders in the family. Patient has no other symptoms at the present time. He denies any chest pain or shortness breath. No hemoptysis. No other complaints. INTERIM HISTORY:   The patient seen for follow-up after that this for hypercoagulability. Lab workup showed elevated homocysteine level. Rest of the labs were negative. Clinically patient is doing fine. He has no recent DVT or PE. No cardiac events. No CVA or TIA. No other complaints. PAST MEDICAL HISTORY: has a past medical history of Anesthesia; Diabetes mellitus ([address] Hypertension; Sleep apnea; Umbilical hernia; and Wears dentures. PAST SURGICAL HISTORY: has a past surgical history that includes Umbilical hernia repair (5/24/16). CURRENT MEDICATIONS:  has a current medication list which includes the following prescription(s): folic acid, atorvastatin, metformin, lisinopril, hydrochlorothiazide, and albuterol sulfate hfa. ALLERGIES:  is allergic to [place] Jessi and [place] [sitagliptin]. FAMILY HISTORY: Negative for any hematological or oncological conditions. SOCIAL HISTORY:  reports that he has been smoking. He has a 45.00 pack-year smoking history. He has never used smokeless tobacco. He reports that he does not drink alcohol or use drugs. REVIEW OF SYSTEMS:     · General: No weakness or fatigue. No unanticipated weight loss or decreased appetite. No fever or chills. · Eyes: No blurred vision, eye pain or double vision. · Ears: No hearing problems or drainage. No tinnitus. · Throat: No sore throat, problems with swallowing or dysphagia. · Respiratory: No cough, sputum or hemoptysis. No shortness of breath. No pleuritic chest pain. · Cardiovascular: No chest pain, orthopnea or PND. No lower extremity edema. No palpitation. · Gastrointestinal: No problems with swallowing. No abdominal pain or bloating. No nausea or vomiting. No diarrhea or constipation. No GI bleeding. · Genitourinary: No dysuria, hematuria, frequency or urgency. · Musculoskeletal: No muscle aches or pains. No limitation of movement. No back pain. No gait disturbance, No joint complaints. · Dermatologic: No skin rashes or pruritus. No skin lesions or discolorations. · Psychiatric: No depression, anxiety, or stress or signs of schizophrenia. No change in mood or affect. · Hematologic: No history of bleeding tendency. No bruises or ecchymosis. No history of clotting problems. · Infectious disease: No fever, chills or frequent infections. · Endocrine: No problems with opacity. No polydipsia or polyuria. No temperature intolerance. · Neurologic: No headaches or dizziness.  No weakness or numbness of the

## 2017-12-01 ENCOUNTER — TELEPHONE (OUTPATIENT)
Dept: ONCOLOGY | Age: 52
End: 2017-12-01

## 2017-12-01 NOTE — TELEPHONE ENCOUNTER
Patient's sister Rg Castorena called. She stated that Afshin Richards did not understand what the doctor had told him. So she was calling to enquire about what his diagnosis was and had some other questions. Itried to call her back. Let a brief message requesting she call me back. His dx is hyperhomocysteinemia. This means the homocystein levels in his body are elevated. This is an inherited problem. J26 and folic acid are the treatment. Patient Q13 and folic acid levels decrease and the homocystein levels rise, which can cause clotting issues, specifically strokes. Sister called back while I was making my note. She was updated on the above. She wanted to know how much b vit he should be taking. I spoke to Dr. Elizabeth Ruiz. He informed me that he and the patient discussed all this at his appointment. He re iterated that he was to take a generic multivitamin with all the b vitamins in it. He needs more than B12. He added that the folic acid in the multivitamin is not enough, that is why he gave a folic acid prescription. I then updated Rg Kell, she verbalized understanding.

## 2017-12-18 RX ORDER — LISINOPRIL 5 MG/1
5 TABLET ORAL DAILY
Qty: 30 TABLET | Refills: 2 | Status: SHIPPED | OUTPATIENT
Start: 2017-12-18 | End: 2018-04-02 | Stop reason: SDUPTHER

## 2017-12-18 RX ORDER — ATORVASTATIN CALCIUM 20 MG/1
20 TABLET, FILM COATED ORAL DAILY
Qty: 30 TABLET | Refills: 2 | Status: SHIPPED | OUTPATIENT
Start: 2017-12-18 | End: 2018-04-02 | Stop reason: SDUPTHER

## 2018-01-08 RX ORDER — HYDROCHLOROTHIAZIDE 25 MG/1
25 TABLET ORAL DAILY
Qty: 30 TABLET | Refills: 2 | Status: SHIPPED | OUTPATIENT
Start: 2018-01-08 | End: 2018-05-07 | Stop reason: SDUPTHER

## 2018-03-01 ENCOUNTER — HOSPITAL ENCOUNTER (OUTPATIENT)
Age: 53
Discharge: HOME | End: 2018-03-01
Payer: MEDICARE

## 2018-03-01 DIAGNOSIS — E72.11 HYPERHOMOCYSTEINEMIA (HCC): ICD-10-CM

## 2018-03-01 LAB — HOMOCYSTEINE: 18.5 UMOL/L

## 2018-03-01 PROCEDURE — 36415 COLL VENOUS BLD VENIPUNCTURE: CPT

## 2018-03-01 PROCEDURE — 83090 ASSAY OF HOMOCYSTEINE: CPT

## 2018-03-05 ENCOUNTER — OFFICE VISIT (OUTPATIENT)
Dept: FAMILY MEDICINE CLINIC | Age: 53
End: 2018-03-05
Payer: MEDICARE

## 2018-03-05 ENCOUNTER — OFFICE VISIT (OUTPATIENT)
Dept: ONCOLOGY | Age: 53
End: 2018-03-05
Payer: MEDICARE

## 2018-03-05 VITALS
HEART RATE: 72 BPM | DIASTOLIC BLOOD PRESSURE: 84 MMHG | TEMPERATURE: 97.8 F | OXYGEN SATURATION: 94 % | WEIGHT: 315 LBS | SYSTOLIC BLOOD PRESSURE: 132 MMHG | BODY MASS INDEX: 50.25 KG/M2

## 2018-03-05 VITALS
BODY MASS INDEX: 50.29 KG/M2 | DIASTOLIC BLOOD PRESSURE: 78 MMHG | WEIGHT: 315 LBS | TEMPERATURE: 98.2 F | HEART RATE: 81 BPM | SYSTOLIC BLOOD PRESSURE: 136 MMHG

## 2018-03-05 DIAGNOSIS — I10 ESSENTIAL HYPERTENSION: ICD-10-CM

## 2018-03-05 DIAGNOSIS — E72.11 HYPERHOMOCYSTEINEMIA (HCC): Primary | ICD-10-CM

## 2018-03-05 DIAGNOSIS — D68.59 HYPERCOAGULABLE STATE (HCC): ICD-10-CM

## 2018-03-05 DIAGNOSIS — E66.01 MORBID OBESITY WITH BODY MASS INDEX (BMI) OF 50.0 TO 59.9 IN ADULT (HCC): ICD-10-CM

## 2018-03-05 DIAGNOSIS — E11.9 TYPE 2 DIABETES MELLITUS WITHOUT COMPLICATION, WITHOUT LONG-TERM CURRENT USE OF INSULIN (HCC): Primary | ICD-10-CM

## 2018-03-05 LAB — HBA1C MFR BLD: 7.8 %

## 2018-03-05 PROCEDURE — G8427 DOCREV CUR MEDS BY ELIG CLIN: HCPCS | Performed by: INTERNAL MEDICINE

## 2018-03-05 PROCEDURE — G8484 FLU IMMUNIZE NO ADMIN: HCPCS | Performed by: INTERNAL MEDICINE

## 2018-03-05 PROCEDURE — 99211 OFF/OP EST MAY X REQ PHY/QHP: CPT

## 2018-03-05 PROCEDURE — 4004F PT TOBACCO SCREEN RCVD TLK: CPT | Performed by: FAMILY MEDICINE

## 2018-03-05 PROCEDURE — G8417 CALC BMI ABV UP PARAM F/U: HCPCS | Performed by: INTERNAL MEDICINE

## 2018-03-05 PROCEDURE — 99214 OFFICE O/P EST MOD 30 MIN: CPT | Performed by: FAMILY MEDICINE

## 2018-03-05 PROCEDURE — G8417 CALC BMI ABV UP PARAM F/U: HCPCS | Performed by: FAMILY MEDICINE

## 2018-03-05 PROCEDURE — 4004F PT TOBACCO SCREEN RCVD TLK: CPT | Performed by: INTERNAL MEDICINE

## 2018-03-05 PROCEDURE — 99214 OFFICE O/P EST MOD 30 MIN: CPT | Performed by: INTERNAL MEDICINE

## 2018-03-05 PROCEDURE — 3045F PR MOST RECENT HEMOGLOBIN A1C LEVEL 7.0-9.0%: CPT | Performed by: FAMILY MEDICINE

## 2018-03-05 PROCEDURE — G8484 FLU IMMUNIZE NO ADMIN: HCPCS | Performed by: FAMILY MEDICINE

## 2018-03-05 PROCEDURE — G8427 DOCREV CUR MEDS BY ELIG CLIN: HCPCS | Performed by: FAMILY MEDICINE

## 2018-03-05 PROCEDURE — 83036 HEMOGLOBIN GLYCOSYLATED A1C: CPT | Performed by: FAMILY MEDICINE

## 2018-03-05 PROCEDURE — 3017F COLORECTAL CA SCREEN DOC REV: CPT | Performed by: INTERNAL MEDICINE

## 2018-03-05 PROCEDURE — 3017F COLORECTAL CA SCREEN DOC REV: CPT | Performed by: FAMILY MEDICINE

## 2018-03-05 RX ORDER — GLIMEPIRIDE 4 MG/1
4 TABLET ORAL EVERY MORNING
Qty: 30 TABLET | Refills: 3 | Status: SHIPPED | OUTPATIENT
Start: 2018-03-05 | End: 2018-07-28 | Stop reason: SDUPTHER

## 2018-03-05 ASSESSMENT — ENCOUNTER SYMPTOMS
ABDOMINAL PAIN: 0
NAUSEA: 0
CONSTIPATION: 0
COUGH: 0
SHORTNESS OF BREATH: 0
DIARRHEA: 0
SORE THROAT: 0

## 2018-03-05 NOTE — PROGRESS NOTES
change and unexpected weight change. HENT: Negative for ear pain and sore throat. Eyes: Negative for visual disturbance. Respiratory: Negative for cough and shortness of breath. Cardiovascular: Negative for chest pain and leg swelling. Gastrointestinal: Negative for abdominal pain, constipation, diarrhea and nausea. Endocrine: Negative for polydipsia and polyuria. Genitourinary: Negative for frequency and urgency. Musculoskeletal: Negative for arthralgias. Neurological: Negative for dizziness, syncope and headaches. Objective:   Physical Exam   Constitutional: He is oriented to person, place, and time. He appears well-developed and well-nourished. /84 (Site: Left Arm, Position: Sitting, Cuff Size: Large Adult)   Pulse 72   Temp 97.8 °F (36.6 °C) (Oral)   Wt (!) 370 lb 8 oz (168.1 kg)   SpO2 94%   BMI 50.25 kg/m²    Morbid obese male   HENT:   Head: Normocephalic. Mouth/Throat: Oropharynx is clear and moist.   Cardiovascular: Normal rate and regular rhythm. Pulmonary/Chest: Breath sounds normal. He has no rales. Abdominal: Soft. There is no tenderness. Musculoskeletal: He exhibits no edema. No pedal edema pedal pulses are palpable monofilament test is normal   Lymphadenopathy:     He has no cervical adenopathy. Neurological: He is alert and oriented to person, place, and time. Nursing note and vitals reviewed. hb A1c today is 7.8    Assessment:      1. Type 2 diabetes mellitus without complication, without long-term current use of insulin (HCC)  Uncontrolled start Amaryl  POCT glycosylated hemoglobin (Hb A1C)     DIABETES FOOT EXAM   2. Essential hypertension  Control    3.  Morbid obesity with body mass index (BMI) of 50.0 to 59.9 in adult ([facility] [address]  Stable            Plan:        Orders Placed This Encounter   Procedures    POCT glycosylated hemoglobin (Hb A1C)     DIABETES FOOT EXAM     Orders Placed This Encounter   Medications    glimepiride (AMARYL) 4 MG

## 2018-03-06 ENCOUNTER — TELEPHONE (OUTPATIENT)
Dept: FAMILY MEDICINE CLINIC | Age: 53
End: 2018-03-06

## 2018-03-06 NOTE — TELEPHONE ENCOUNTER
Per Dr Melissa Shepard phoned in Nicoderm patches to pt pharmacy. 21 mg. 6weeks #45  14mg. 2 weeks #14  7mg. 2 weeks #14    No refills. Spoke with Estefania.

## 2018-04-02 RX ORDER — LISINOPRIL 5 MG/1
5 TABLET ORAL DAILY
Qty: 30 TABLET | Refills: 2 | Status: SHIPPED | OUTPATIENT
Start: 2018-04-02 | End: 2018-07-30 | Stop reason: SDUPTHER

## 2018-04-02 RX ORDER — ATORVASTATIN CALCIUM 20 MG/1
20 TABLET, FILM COATED ORAL DAILY
Qty: 30 TABLET | Refills: 2 | Status: SHIPPED | OUTPATIENT
Start: 2018-04-02 | End: 2018-07-28 | Stop reason: SDUPTHER

## 2018-05-07 RX ORDER — HYDROCHLOROTHIAZIDE 25 MG/1
25 TABLET ORAL DAILY
Qty: 30 TABLET | Refills: 2 | Status: SHIPPED | OUTPATIENT
Start: 2018-05-07 | End: 2018-09-04 | Stop reason: SDUPTHER

## 2018-06-04 ENCOUNTER — TELEPHONE (OUTPATIENT)
Dept: ONCOLOGY | Age: 53
End: 2018-06-04

## 2018-06-06 ENCOUNTER — HOSPITAL ENCOUNTER (OUTPATIENT)
Age: 53
Discharge: HOME | End: 2018-06-06

## 2018-06-06 DIAGNOSIS — E72.11 HYPERHOMOCYSTEINEMIA (HCC): ICD-10-CM

## 2018-06-06 LAB — HOMOCYSTEINE: 12.9 UMOL/L

## 2018-06-06 PROCEDURE — 83090 ASSAY OF HOMOCYSTEINE: CPT

## 2018-06-06 PROCEDURE — 36415 COLL VENOUS BLD VENIPUNCTURE: CPT

## 2018-06-08 ENCOUNTER — OFFICE VISIT (OUTPATIENT)
Dept: ONCOLOGY | Age: 53
End: 2018-06-08
Payer: MEDICARE

## 2018-06-08 VITALS
TEMPERATURE: 98.1 F | SYSTOLIC BLOOD PRESSURE: 144 MMHG | BODY MASS INDEX: 50.26 KG/M2 | HEART RATE: 83 BPM | DIASTOLIC BLOOD PRESSURE: 86 MMHG | RESPIRATION RATE: 24 BRPM | WEIGHT: 315 LBS

## 2018-06-08 DIAGNOSIS — E72.11 HYPERHOMOCYSTEINEMIA (HCC): Primary | ICD-10-CM

## 2018-06-08 PROCEDURE — 99214 OFFICE O/P EST MOD 30 MIN: CPT | Performed by: INTERNAL MEDICINE

## 2018-06-08 PROCEDURE — 99211 OFF/OP EST MAY X REQ PHY/QHP: CPT | Performed by: INTERNAL MEDICINE

## 2018-07-30 RX ORDER — LISINOPRIL 5 MG/1
5 TABLET ORAL DAILY
Qty: 30 TABLET | Refills: 0 | Status: SHIPPED | OUTPATIENT
Start: 2018-07-30 | End: 2018-09-04 | Stop reason: SDUPTHER

## 2018-08-23 ENCOUNTER — OFFICE VISIT (OUTPATIENT)
Dept: FAMILY MEDICINE CLINIC | Age: 53
End: 2018-08-23

## 2018-08-23 ENCOUNTER — HOSPITAL ENCOUNTER (OUTPATIENT)
Age: 53
Setting detail: SPECIMEN
Discharge: HOME | End: 2018-08-23

## 2018-08-23 VITALS
WEIGHT: 315 LBS | DIASTOLIC BLOOD PRESSURE: 74 MMHG | SYSTOLIC BLOOD PRESSURE: 122 MMHG | TEMPERATURE: 97.8 F | HEART RATE: 88 BPM | BODY MASS INDEX: 42.66 KG/M2 | HEIGHT: 72 IN

## 2018-08-23 DIAGNOSIS — I10 ESSENTIAL HYPERTENSION: ICD-10-CM

## 2018-08-23 DIAGNOSIS — E11.9 TYPE 2 DIABETES MELLITUS WITHOUT COMPLICATION, WITHOUT LONG-TERM CURRENT USE OF INSULIN (HCC): Primary | ICD-10-CM

## 2018-08-23 DIAGNOSIS — S31.109A OPEN WOUND OF ABDOMINAL WALL, INITIAL ENCOUNTER: ICD-10-CM

## 2018-08-23 DIAGNOSIS — E66.01 MORBID OBESITY WITH BMI OF 45.0-49.9, ADULT (HCC): ICD-10-CM

## 2018-08-23 LAB — HBA1C MFR BLD: 11 %

## 2018-08-23 PROCEDURE — 83036 HEMOGLOBIN GLYCOSYLATED A1C: CPT | Performed by: FAMILY MEDICINE

## 2018-08-23 PROCEDURE — 99214 OFFICE O/P EST MOD 30 MIN: CPT | Performed by: FAMILY MEDICINE

## 2018-08-23 RX ORDER — CEPHALEXIN 500 MG/1
500 CAPSULE ORAL 2 TIMES DAILY
Qty: 20 CAPSULE | Refills: 0 | Status: SHIPPED | OUTPATIENT
Start: 2018-08-23 | End: 2019-03-05 | Stop reason: ALTCHOICE

## 2018-08-23 RX ORDER — GLIMEPIRIDE 4 MG/1
4 TABLET ORAL 2 TIMES DAILY
COMMUNITY
End: 2018-10-01 | Stop reason: SDUPTHER

## 2018-08-23 ASSESSMENT — ENCOUNTER SYMPTOMS
ABDOMINAL PAIN: 0
DIARRHEA: 0
NAUSEA: 0
COUGH: 0
CONSTIPATION: 0
SORE THROAT: 0
SHORTNESS OF BREATH: 0

## 2018-08-23 ASSESSMENT — PATIENT HEALTH QUESTIONNAIRE - PHQ9
SUM OF ALL RESPONSES TO PHQ QUESTIONS 1-9: 0
SUM OF ALL RESPONSES TO PHQ QUESTIONS 1-9: 0
1. LITTLE INTEREST OR PLEASURE IN DOING THINGS: 0
2. FEELING DOWN, DEPRESSED OR HOPELESS: 0
SUM OF ALL RESPONSES TO PHQ9 QUESTIONS 1 & 2: 0

## 2018-08-23 NOTE — PROGRESS NOTES
Size: Large Adult)   Pulse 88   Temp 97.8 °F (36.6 °C) (Oral)   Ht 6' (1.829 m)   Wt (!) 355 lb 6.4 oz (161.2 kg)   BMI 48.20 kg/m²    HENT:   Head: Normocephalic. Mouth/Throat: Oropharynx is clear and moist.   Cardiovascular: Normal rate and regular rhythm. Pulmonary/Chest: Breath sounds normal. He has no rales. Abdominal: Soft. There is no tenderness. There is a wound present on the left side of the abdominal wall yellowish drainage with some induration present   Musculoskeletal: He exhibits no edema. Lymphadenopathy:     He has no cervical adenopathy. Neurological: He is alert and oriented to person, place, and time. Nursing note and vitals reviewed. Hemoglobin A1c is 11    Assessment:       Diagnosis Orders   1. Type 2 diabetes mellitus without complication, without long-term current use of insulin (HCC)  Uncontrolled increase Amaryl  POCT glycosylated hemoglobin (Hb A1C)   2. Open wound of abdominal wall, initial encounter  Wound Culture   3. Morbid obesity with BMI of 45.0-49.9, adult ([facility] [address]     4. Essential hypertension  Controlled           Plan:         Orders Placed This Encounter   Procedures    Wound Culture    POCT glycosylated hemoglobin (Hb A1C)     Orders Placed This Encounter   Medications    cephALEXin (KEFLEX) 500 MG capsule     Sig: Take 1 capsule by mouth 2 times daily     Dispense:  20 capsule     Refill:  0     Return in about 2 weeks (around 9/6/2018) for diabetes.     Continue current medications reviewed from the chart    Patient is referred to [facility] wound clinic        Lorin Hansen MA

## 2018-08-25 LAB
CULTURE: ABNORMAL
CULTURE: ABNORMAL
DIRECT EXAM: ABNORMAL
DIRECT EXAM: ABNORMAL
Lab: ABNORMAL
SPECIMEN DESCRIPTION: ABNORMAL
STATUS: ABNORMAL

## 2018-08-27 ENCOUNTER — HOSPITAL ENCOUNTER (OUTPATIENT)
Dept: WOUND CARE | Age: 53
Discharge: HOME | End: 2018-08-27

## 2018-08-27 VITALS
RESPIRATION RATE: 18 BRPM | BODY MASS INDEX: 42.66 KG/M2 | HEART RATE: 88 BPM | DIASTOLIC BLOOD PRESSURE: 77 MMHG | SYSTOLIC BLOOD PRESSURE: 135 MMHG | TEMPERATURE: 98.1 F | WEIGHT: 315 LBS | HEIGHT: 72 IN

## 2018-08-27 DIAGNOSIS — L03.90 WOUND CELLULITIS: ICD-10-CM

## 2018-08-27 LAB
CULTURE: NORMAL
CULTURE: NORMAL
DIRECT EXAM: NORMAL
Lab: NORMAL
SPECIMEN DESCRIPTION: NORMAL
STATUS: NORMAL

## 2018-08-27 PROCEDURE — 6370000000 HC RX 637 (ALT 250 FOR IP): Performed by: INTERNAL MEDICINE

## 2018-08-27 PROCEDURE — 99213 OFFICE O/P EST LOW 20 MIN: CPT

## 2018-08-27 PROCEDURE — 11042 DBRDMT SUBQ TIS 1ST 20SQCM/<: CPT

## 2018-08-27 PROCEDURE — 87205 SMEAR GRAM STAIN: CPT

## 2018-08-27 PROCEDURE — 11042 DBRDMT SUBQ TIS 1ST 20SQCM/<: CPT | Performed by: INTERNAL MEDICINE

## 2018-08-27 PROCEDURE — 86403 PARTICLE AGGLUT ANTBDY SCRN: CPT

## 2018-08-27 PROCEDURE — 11045 DBRDMT SUBQ TISS EACH ADDL: CPT | Performed by: INTERNAL MEDICINE

## 2018-08-27 PROCEDURE — 99204 OFFICE O/P NEW MOD 45 MIN: CPT | Performed by: INTERNAL MEDICINE

## 2018-08-27 PROCEDURE — 87075 CULTR BACTERIA EXCEPT BLOOD: CPT

## 2018-08-27 PROCEDURE — 87070 CULTURE OTHR SPECIMN AEROBIC: CPT

## 2018-08-27 PROCEDURE — 11045 DBRDMT SUBQ TISS EACH ADDL: CPT

## 2018-08-27 PROCEDURE — 87076 CULTURE ANAEROBE IDENT EACH: CPT

## 2018-08-27 RX ORDER — M-VIT,TX,IRON,MINS/CALC/FOLIC 27MG-0.4MG
1 TABLET ORAL DAILY
COMMUNITY

## 2018-08-27 RX ORDER — LIDOCAINE HYDROCHLORIDE 40 MG/ML
SOLUTION TOPICAL ONCE
Status: COMPLETED | OUTPATIENT
Start: 2018-08-27 | End: 2018-08-27

## 2018-08-27 RX ORDER — VITAMIN B COMPLEX
1 CAPSULE ORAL DAILY
COMMUNITY

## 2018-08-27 RX ORDER — ASPIRIN 81 MG/1
81 TABLET, CHEWABLE ORAL DAILY
COMMUNITY
End: 2021-06-11 | Stop reason: SDUPTHER

## 2018-08-27 RX ORDER — DOXYCYCLINE HYCLATE 100 MG/1
100 CAPSULE ORAL 2 TIMES DAILY
Qty: 28 CAPSULE | Refills: 0 | Status: SHIPPED | OUTPATIENT
Start: 2018-08-27 | End: 2018-09-10

## 2018-08-27 RX ADMIN — LIDOCAINE HYDROCHLORIDE 5 ML: 40 SOLUTION TOPICAL at 11:50

## 2018-08-27 NOTE — PROGRESS NOTES
on File Prior to Encounter   Medication Sig Dispense Refill    cephALEXin (KEFLEX) 500 MG capsule Take 1 capsule by mouth 2 times daily 20 capsule 0    glimepiride (AMARYL) 4 MG tablet Take 4 mg by mouth 2 times daily      atorvastatin (LIPITOR) 20 MG tablet TAKE 1 TABLET BY MOUTH ONCE DAILY 30 tablet 1    metFORMIN (GLUCOPHAGE) 1000 MG tablet Take 1 tablet by mouth 2 times daily (with meals) 60 tablet 0    lisinopril (PRINIVIL;ZESTRIL) 5 MG tablet Take 1 tablet by mouth daily 30 tablet 0    hydrochlorothiazide (HYDRODIURIL) 25 MG tablet Take 1 tablet by mouth daily 30 tablet 2    albuterol sulfate  (90 Base) MCG/ACT inhaler Inhale 2 puffs into the lungs every 6 hours as needed for Wheezing       No current facility-administered medications on file prior to encounter. REVIEW OF SYSTEMS    A comprehensive review of systems was negative.     Objective:      /77   Pulse 88   Temp 98.1 °F (36.7 °C) (Tympanic)   Resp 18   Ht 6' (1.829 m)   Wt (!) 355 lb (161 kg)   BMI 48.15 kg/m²     Wt Readings from Last 3 Encounters:   08/27/18 (!) 355 lb (161 kg)   08/23/18 (!) 355 lb 6.4 oz (161.2 kg)   06/08/18 (!) 370 lb 9.6 oz (168.1 kg)       PHYSICAL EXAM    General Appearance: alert and oriented to person, place and time, well developed and well- nourished, in no acute distress  Skin: warm and dry  Head: normocephalic and atraumatic  Eyes: pupils equal, round    Neck: supple and non-tender  Pulmonary/Chest: clear to auscultation bilaterally- mild wheezes  Cardiovascular: normal rate, regular rhythm, normal S1 and S2, no murmurs  Abdomen: soft, non-tender, non-distended, open wound to the left lower quadrant with surrounding erythema and warmth, no fluctuation  Extremities: no cyanosis, clubbing or edema  Musculoskeletal: normal range of motion, no joint swelling, deformity or tenderness  Neurologic: reflexes normal and symmetric, no cranial nerve deficit, gait, coordination and speech normal      Assessment:      Patient Active Problem List   Diagnosis Code    Essential hypertension I10    MIGUEL treated with BiPAP G47.33    Type 2 diabetes mellitus without complication, without long-term current use of insulin (HCC) E11.9    Low HDL (under 40) E78.6    Morbid obesity with BMI of 45.0-49.9, adult ([facility] [address] E66.01, Z68.42    Hypercoagulable state ([facility] [address] D68.59    [facility] ([facility] E72.11        Procedure Note  Indications:  Based on my examination of this patient's wound(s)/ulcer(s) today, debridement is required to promote healing and evaluate the wound base. Performed by: Darcie Del Rosario MD    Consent obtained:  Yes    Time out taken:  Yes    Pain Control: Anesthetic  Anesthetic: 4% Lidocaine Liquid Topical       Debridement:Excisional Debridement    Using curette the wound(s)/ulcer(s) was/were sharply debrided down through and including the removal of subcutaneous tissue. Devitalized Tissue Debrided:  fibrin, biofilm and slough    Pre Debridement Measurements:  Are located in the [place]  Documentation Flow Sheet    Wound/Ulcer #: 1    Post Debridement Measurements:  Wound/Ulcer Descriptions are Pre Debridement except measurements:    Wound 08/27/18 Other (Comment) Abdomen Left; Lower Wound # 1 Left lower abdomen,states started out as a boil,2 weeks ago.  (Active)   Wound Image   8/27/2018 10:27 AM   Wound Type Wound 8/27/2018 10:27 AM   Wound Other 8/27/2018 10:27 AM   Dressing Status Old drainage 8/27/2018 10:27 AM   Dressing Changed Changed/New 8/27/2018 10:27 AM   Wound Cleansed Wound cleanser 8/27/2018 10:27 AM   Wound Length (cm) 4.5 cm 8/27/2018 11:42 AM   Wound Width (cm) 7 cm 8/27/2018 11:42 AM   Wound Depth (cm)  0.1 8/27/2018 11:42 AM   Calculated Wound Size (cm^2) (l*w) 31.5 cm^2 8/27/2018 11:42 AM   Change in Wound Size % (l*w) 0 8/27/2018 11:42 AM   Wound Assessment Bleeding;Red 8/27/2018 10:27 AM   Drainage Amount Moderate 8/27/2018 10:27 AM   Drainage Description

## 2018-09-02 LAB
CULTURE: ABNORMAL
DIRECT EXAM: ABNORMAL
DIRECT EXAM: ABNORMAL
Lab: ABNORMAL
SPECIMEN DESCRIPTION: ABNORMAL
STATUS: ABNORMAL

## 2018-09-04 ENCOUNTER — HOSPITAL ENCOUNTER (OUTPATIENT)
Dept: WOUND CARE | Age: 53
Discharge: HOME | End: 2018-09-04

## 2018-09-04 VITALS
RESPIRATION RATE: 18 BRPM | HEART RATE: 84 BPM | SYSTOLIC BLOOD PRESSURE: 141 MMHG | WEIGHT: 315 LBS | DIASTOLIC BLOOD PRESSURE: 75 MMHG | HEIGHT: 72 IN | BODY MASS INDEX: 42.66 KG/M2 | TEMPERATURE: 97.5 F

## 2018-09-04 PROCEDURE — 99212 OFFICE O/P EST SF 10 MIN: CPT

## 2018-09-04 PROCEDURE — 99212 OFFICE O/P EST SF 10 MIN: CPT | Performed by: NURSE PRACTITIONER

## 2018-09-04 PROCEDURE — 6370000000 HC RX 637 (ALT 250 FOR IP): Performed by: NURSE PRACTITIONER

## 2018-09-04 RX ORDER — LISINOPRIL 5 MG/1
5 TABLET ORAL DAILY
Qty: 30 TABLET | Refills: 2 | Status: SHIPPED | OUTPATIENT
Start: 2018-09-04 | End: 2021-06-11 | Stop reason: SDUPTHER

## 2018-09-04 RX ORDER — LIDOCAINE HYDROCHLORIDE 40 MG/ML
SOLUTION TOPICAL ONCE
Status: COMPLETED | OUTPATIENT
Start: 2018-09-04 | End: 2018-09-04

## 2018-09-04 RX ORDER — HYDROCHLOROTHIAZIDE 25 MG/1
25 TABLET ORAL DAILY
Qty: 30 TABLET | Refills: 2 | Status: SHIPPED | OUTPATIENT
Start: 2018-09-04 | End: 2021-06-11 | Stop reason: SDUPTHER

## 2018-09-04 RX ADMIN — LIDOCAINE HYDROCHLORIDE 5 ML: 40 SOLUTION TOPICAL at 10:24

## 2018-09-04 ASSESSMENT — PAIN SCALES - GENERAL: PAINLEVEL_OUTOF10: 0

## 2018-09-11 ENCOUNTER — HOSPITAL ENCOUNTER (OUTPATIENT)
Dept: WOUND CARE | Age: 53
Discharge: HOME | End: 2018-09-11

## 2018-09-12 ENCOUNTER — HOSPITAL ENCOUNTER (OUTPATIENT)
Dept: WOUND CARE | Age: 53
Discharge: HOME | End: 2018-09-12

## 2018-09-12 VITALS
TEMPERATURE: 98.3 F | BODY MASS INDEX: 42.66 KG/M2 | DIASTOLIC BLOOD PRESSURE: 71 MMHG | HEIGHT: 72 IN | RESPIRATION RATE: 18 BRPM | WEIGHT: 315 LBS | SYSTOLIC BLOOD PRESSURE: 129 MMHG | HEART RATE: 93 BPM

## 2018-09-12 PROCEDURE — 99212 OFFICE O/P EST SF 10 MIN: CPT | Performed by: NURSE PRACTITIONER

## 2018-09-12 PROCEDURE — 99211 OFF/OP EST MAY X REQ PHY/QHP: CPT

## 2018-09-12 RX ORDER — LIDOCAINE HYDROCHLORIDE 40 MG/ML
SOLUTION TOPICAL ONCE
Status: DISCONTINUED | OUTPATIENT
Start: 2018-09-12 | End: 2018-09-13 | Stop reason: HOSPADM

## 2018-09-12 ASSESSMENT — PAIN SCALES - GENERAL: PAINLEVEL_OUTOF10: 0

## 2018-09-12 NOTE — PROGRESS NOTES
daily    Smoking cessation:   n/a    Patient instructions were given.     Follow up: as needed    Electronically signed by ELDA Marmolejo CNP on 9/12/2018 at 4:41 PM

## 2018-10-01 RX ORDER — GLIMEPIRIDE 4 MG/1
4 TABLET ORAL 2 TIMES DAILY
Qty: 30 TABLET | Refills: 1 | Status: SHIPPED | OUTPATIENT
Start: 2018-10-01 | End: 2018-12-27 | Stop reason: SDUPTHER

## 2018-10-01 RX ORDER — ATORVASTATIN CALCIUM 20 MG/1
TABLET, FILM COATED ORAL
Qty: 30 TABLET | Refills: 1 | Status: SHIPPED | OUTPATIENT
Start: 2018-10-01 | End: 2021-06-11 | Stop reason: SDUPTHER

## 2018-10-02 ENCOUNTER — TELEPHONE (OUTPATIENT)
Dept: ONCOLOGY | Age: 53
End: 2018-10-02

## 2018-10-02 NOTE — TELEPHONE ENCOUNTER
Left message for patient to remind of lab work needed prior to upcoming appointment. Advised that if the labs were done or orders needed faxed to call the office.

## 2018-12-07 ENCOUNTER — TELEPHONE (OUTPATIENT)
Dept: ONCOLOGY | Age: 53
End: 2018-12-07

## 2019-03-05 ENCOUNTER — OFFICE VISIT (OUTPATIENT)
Dept: FAMILY MEDICINE CLINIC | Age: 54
End: 2019-03-05

## 2019-03-05 ENCOUNTER — HOSPITAL ENCOUNTER (OUTPATIENT)
Age: 54
Setting detail: SPECIMEN
Discharge: HOME | End: 2019-03-05

## 2019-03-05 VITALS
HEART RATE: 78 BPM | SYSTOLIC BLOOD PRESSURE: 116 MMHG | DIASTOLIC BLOOD PRESSURE: 64 MMHG | HEIGHT: 72 IN | BODY MASS INDEX: 42.66 KG/M2 | TEMPERATURE: 97.8 F | WEIGHT: 315 LBS

## 2019-03-05 DIAGNOSIS — E11.9 TYPE 2 DIABETES MELLITUS WITHOUT COMPLICATION, WITHOUT LONG-TERM CURRENT USE OF INSULIN (HCC): ICD-10-CM

## 2019-03-05 DIAGNOSIS — E66.01 MORBID OBESITY WITH BMI OF 45.0-49.9, ADULT (HCC): ICD-10-CM

## 2019-03-05 DIAGNOSIS — E11.9 TYPE 2 DIABETES MELLITUS WITHOUT COMPLICATION, WITHOUT LONG-TERM CURRENT USE OF INSULIN (HCC): Primary | ICD-10-CM

## 2019-03-05 DIAGNOSIS — I10 ESSENTIAL HYPERTENSION: ICD-10-CM

## 2019-03-05 LAB
CREATININE URINE: 244.3 MG/DL (ref 39–259)
HBA1C MFR BLD: 11.4 %
MICROALBUMIN/CREAT 24H UR: 123 MG/L
MICROALBUMIN/CREAT UR-RTO: 50 MCG/MG CREAT

## 2019-03-05 PROCEDURE — 99213 OFFICE O/P EST LOW 20 MIN: CPT | Performed by: FAMILY MEDICINE

## 2019-03-05 PROCEDURE — 83036 HEMOGLOBIN GLYCOSYLATED A1C: CPT | Performed by: FAMILY MEDICINE

## 2019-03-05 ASSESSMENT — ENCOUNTER SYMPTOMS
DIARRHEA: 0
VOMITING: 0
RHINORRHEA: 0
SORE THROAT: 0
NAUSEA: 0
SHORTNESS OF BREATH: 0
COUGH: 0
ABDOMINAL PAIN: 0

## 2019-03-05 ASSESSMENT — PATIENT HEALTH QUESTIONNAIRE - PHQ9
1. LITTLE INTEREST OR PLEASURE IN DOING THINGS: 0
2. FEELING DOWN, DEPRESSED OR HOPELESS: 0
SUM OF ALL RESPONSES TO PHQ9 QUESTIONS 1 & 2: 0
SUM OF ALL RESPONSES TO PHQ QUESTIONS 1-9: 0
SUM OF ALL RESPONSES TO PHQ QUESTIONS 1-9: 0

## 2019-03-07 ENCOUNTER — TELEPHONE (OUTPATIENT)
Dept: FAMILY MEDICINE CLINIC | Age: 54
End: 2019-03-07

## 2019-03-27 ENCOUNTER — TELEPHONE (OUTPATIENT)
Dept: ONCOLOGY | Age: 54
End: 2019-03-27

## 2019-07-26 ENCOUNTER — TELEPHONE (OUTPATIENT)
Dept: FAMILY MEDICINE CLINIC | Age: 54
End: 2019-07-26

## 2019-10-23 ENCOUNTER — TELEPHONE (OUTPATIENT)
Dept: FAMILY MEDICINE CLINIC | Age: 54
End: 2019-10-23

## 2019-11-08 ENCOUNTER — TELEPHONE (OUTPATIENT)
Dept: FAMILY MEDICINE CLINIC | Age: 54
End: 2019-11-08

## 2021-06-08 PROBLEM — E78.2 MIXED HYPERLIPIDEMIA: Status: ACTIVE | Noted: 2021-06-08

## 2021-06-08 NOTE — PROGRESS NOTES
[facility] PHYSICIANS  [facility] PHYSICIANS [facility]  [address]  [address] [address]  Dept: Aaliyah Cruz (:  1965) is a 48 y.o. male. Patient is here for evaluation of the following chief complaint(s):  Chief Complaint   Patient presents with    Established New Doctor    Diabetes        SUBJECTIVE/OBJECTIVE:  HPI  Lazara Sheldon is a 48 y.o. male patient. Patient is a new patient. Patient has a known history of hypertesnsion, Diabetes Mellitus, hyperlipidemia, asthma, Jenny, polynerupathy, hyperchromocysteinemia, current smoker, and morbid obesity. HYPERTENSION  Lazara Sheldon has a well controlled hypertension. he is currently on lisinopril, HCTZ. Patient's most recent BP in the office was stable. he reports stable BP readings at home. Patient denies any adverse reaction to this therapy. he denies any CP, SOB, HA, or palpitations. Patient admits to exercising and adheres to low salt diet. No history of organ damage due to condition noted. Lab Results   Component Value Date/Time    CREATININE 1.27 (H) 05/15/2017 09:30 AM     DIABETES MELLITUS/POLYNEUROPATHY  Patient has a  stable Diabetes Mellitus. Current therapy includes Metformin, Glimeperide. Patient is responding poorly with this therapy. Patient reports home glucose monitoring as Stable readings. Patient denieshypoglycemia episodes such as{symptoms. Patient denies neither vomiting nor diarrhea. Patient has a known polyneuropathy. No treatment currently. Eye Exam: due  Foot Exam:due  Lab Results   Component Value Date/Time    LABA1C 11.4 2019 03:30 PM    LABA1C 11 2018 01:10 PM      Jese Navarrete is currently on atorvastatin (Lipitor). Patient denies adverse reaction to this medication. Compliance with treatment thus far has been good. The patient is not known to have coexisting coronary artery disease.  The CVD Risk score (D'Agostino, et al., 2008) failed to calculate for MACHINE AT NIGHT.  Umbilical hernia     Wears dentures     UPPER       Prior to Visit Medications    Medication Sig Taking? Authorizing Provider   atorvastatin (LIPITOR) 20 MG tablet TAKE 1 TABLET BY MOUTH ONCE DAILY Yes ELDA Ruiz CNP   glimepiride (AMARYL) 4 MG tablet Take 1 tablet by mouth 2 times daily Yes ELDA Ruiz CNP   hydroCHLOROthiazide (HYDRODIURIL) 25 MG tablet Take 1 tablet by mouth daily Yes ELDA Ruiz CNP   lisinopril (PRINIVIL;ZESTRIL) 5 MG tablet Take 1 tablet by mouth daily Yes ELDA Ruiz CNP   metFORMIN (GLUCOPHAGE) 1000 MG tablet Take 1 tablet by mouth 2 times daily (with meals) Yes ELDA Ruiz CNP   blood glucose monitor strips Test 2-3 times a day & as needed for symptoms of irregular blood glucose. BRAND OF CHOICE INSURANCE ALLOWS. Yes ELDA Ruiz CNP   Alcohol Swabs (ALCOHOL PREP) PADS Use as directed Yes ELDA Ruiz CNP   Lancets MISC 1 each by Does not apply route 2 times daily Yes ELDA Ruiz CNP   glucose monitoring kit (FREESTYLE) monitoring kit Use as directed. BRAND OF CHOICE INSURANCE ALLOWS. Yes ELDA Ruiz CNP   aspirin 81 MG chewable tablet Take 1 tablet by mouth daily Yes ELDA Ruiz CNP   albuterol sulfate  (90 Base) MCG/ACT inhaler Inhale 2 puffs into the lungs every 6 hours as needed for Wheezing Yes ELDA Ruiz CNP   Multiple Vitamins-Minerals (THERAPEUTIC MULTIVITAMIN-MINERALS) tablet Take 1 tablet by mouth daily Yes Historical Provider, MD   b complex vitamins capsule Take 1 capsule by mouth daily Yes Historical Provider, MD       ASSESSMENT/PLAN:  1. Essential hypertension  Stable  Continue current therapy. DISCUSSED AND ADVISED TO:  Cut down on your salt intake. Cut down on caffeinated drinks, sports drinks. Instructed to check BP at home regularly.   Report for any chest pains, shortness of breath, consumption of red meats, fried foods, trans fats, sweets, sugary beverages. Advised to increase fish, vegetables, and fruits consumption. Advised to add fiber or OTC supplements in diet. Discussed weight loss which will result in improvement of lipids levels. Advised to increase daily physical activities and add regular exercises. - Comprehensive Metabolic Panel, Fasting; Future  - Lipid, Fasting; Future  - atorvastatin (LIPITOR) 20 MG tablet; TAKE 1 TABLET BY MOUTH ONCE DAILY  Dispense: 30 tablet; Refill: 1    4. Mild intermittent asthma without complication  Stable  Continue current therapy. DISCUSSED and ADVISED TO:  Use prescribed inhalers or medications regularly. Avoid known irritants and exposure to known triggers. Advised to report the need to use your albuterol inhaler more than usual  Stay away from smoking or second hand smoke. Go to the nearest ER for increasing SOB. - albuterol sulfate  (90 Base) MCG/ACT inhaler; Inhale 2 puffs into the lungs every 6 hours as needed for Wheezing  Dispense: 1 Inhaler; Refill: 2    5. MIGUEL treated with BiPAP  Stable  DISCUSSED AND ADVISED TO:  Weight loss with diet exercise,   decrease caffeine intake. Especially in the evening. Healthy sleep hygiene measures,  Effective positional therapy,  Avoid sleep deprivation  Continue CPAP use nightly as discussed. 6. Polyneuropathy  Stable  Continue to evaluate      7. Hyperhomocysteinemia (HCC)  Stable  Continue to evaluate    - Vitamin D 25 Hydroxy; Future  - Vitamin B12 & Folate; Future    8. Current smoker  Counseling given to quit smoking. Support offered. Hand out given. Educational material given  Patient declined despite discussion. 9. Morbid obesity with BMI of 45.0-49.9, adult ([address]  BMI increasing  DISCUSSED AND ADVISED TO:  Eat a low-fat and low carbohydrates diet. Avoid fried foods especially fast food. Choose healthier options for snacks.   Have 5-6 servings of fruits and

## 2021-06-11 ENCOUNTER — OFFICE VISIT (OUTPATIENT)
Dept: FAMILY MEDICINE CLINIC | Age: 56
End: 2021-06-11
Payer: COMMERCIAL

## 2021-06-11 VITALS
OXYGEN SATURATION: 95 % | SYSTOLIC BLOOD PRESSURE: 138 MMHG | BODY MASS INDEX: 42.66 KG/M2 | DIASTOLIC BLOOD PRESSURE: 80 MMHG | HEART RATE: 83 BPM | HEIGHT: 72 IN | TEMPERATURE: 97.2 F | WEIGHT: 315 LBS

## 2021-06-11 DIAGNOSIS — E78.2 MIXED HYPERLIPIDEMIA: ICD-10-CM

## 2021-06-11 DIAGNOSIS — E72.11 HYPERHOMOCYSTEINEMIA (HCC): ICD-10-CM

## 2021-06-11 DIAGNOSIS — E66.01 MORBID OBESITY WITH BMI OF 45.0-49.9, ADULT (HCC): ICD-10-CM

## 2021-06-11 DIAGNOSIS — Z11.59 SCREENING FOR VIRAL DISEASE: ICD-10-CM

## 2021-06-11 DIAGNOSIS — F17.200 CURRENT SMOKER: ICD-10-CM

## 2021-06-11 DIAGNOSIS — G47.33 OSA TREATED WITH BIPAP: ICD-10-CM

## 2021-06-11 DIAGNOSIS — R63.5 WEIGHT GAIN: ICD-10-CM

## 2021-06-11 DIAGNOSIS — E11.9 TYPE 2 DIABETES MELLITUS WITHOUT COMPLICATION, WITHOUT LONG-TERM CURRENT USE OF INSULIN (HCC): ICD-10-CM

## 2021-06-11 DIAGNOSIS — Z12.5 SCREENING FOR PROSTATE CANCER: ICD-10-CM

## 2021-06-11 DIAGNOSIS — J45.20 MILD INTERMITTENT ASTHMA WITHOUT COMPLICATION: ICD-10-CM

## 2021-06-11 DIAGNOSIS — I10 ESSENTIAL HYPERTENSION: Primary | ICD-10-CM

## 2021-06-11 DIAGNOSIS — G62.9 POLYNEUROPATHY: ICD-10-CM

## 2021-06-11 PROCEDURE — 81003 URINALYSIS AUTO W/O SCOPE: CPT | Performed by: FAMILY MEDICINE

## 2021-06-11 PROCEDURE — 99204 OFFICE O/P NEW MOD 45 MIN: CPT | Performed by: FAMILY MEDICINE

## 2021-06-11 RX ORDER — LANCETS 30 GAUGE
1 EACH MISCELLANEOUS 2 TIMES DAILY
Qty: 300 EACH | Refills: 11 | Status: SHIPPED | OUTPATIENT
Start: 2021-06-11 | End: 2022-06-20 | Stop reason: ALTCHOICE

## 2021-06-11 RX ORDER — PEN NEEDLE, DIABETIC 31 GX5/16"
NEEDLE, DISPOSABLE MISCELLANEOUS
Qty: 100 EACH | Refills: 11 | Status: SHIPPED | OUTPATIENT
Start: 2021-06-11 | End: 2022-06-20 | Stop reason: ALTCHOICE

## 2021-06-11 RX ORDER — ALBUTEROL SULFATE 90 UG/1
2 AEROSOL, METERED RESPIRATORY (INHALATION) EVERY 6 HOURS PRN
Qty: 1 INHALER | Refills: 2 | Status: SHIPPED | OUTPATIENT
Start: 2021-06-11 | End: 2021-11-07 | Stop reason: SDUPTHER

## 2021-06-11 RX ORDER — GLUCOSAMINE HCL/CHONDROITIN SU 500-400 MG
CAPSULE ORAL
Qty: 100 STRIP | Refills: 11 | Status: SHIPPED | OUTPATIENT
Start: 2021-06-11 | End: 2022-06-20 | Stop reason: ALTCHOICE

## 2021-06-11 RX ORDER — GLIMEPIRIDE 4 MG/1
4 TABLET ORAL 2 TIMES DAILY
Qty: 60 TABLET | Refills: 0 | Status: SHIPPED | OUTPATIENT
Start: 2021-06-11 | Stop reason: SDUPTHER

## 2021-06-11 RX ORDER — LISINOPRIL 5 MG/1
5 TABLET ORAL DAILY
Qty: 30 TABLET | Refills: 2 | Status: SHIPPED | OUTPATIENT
Start: 2021-06-11 | Stop reason: SDUPTHER

## 2021-06-11 RX ORDER — HYDROCHLOROTHIAZIDE 25 MG/1
25 TABLET ORAL DAILY
Qty: 30 TABLET | Refills: 2 | Status: SHIPPED | OUTPATIENT
Start: 2021-06-11 | End: 2021-11-07 | Stop reason: SDUPTHER

## 2021-06-11 RX ORDER — ATORVASTATIN CALCIUM 20 MG/1
TABLET, FILM COATED ORAL
Qty: 30 TABLET | Refills: 1 | Status: SHIPPED | OUTPATIENT
Start: 2021-06-11 | End: 2021-10-25

## 2021-06-11 RX ORDER — ASPIRIN 81 MG/1
81 TABLET, CHEWABLE ORAL DAILY
Qty: 90 TABLET | Refills: 5 | Status: SHIPPED | OUTPATIENT
Start: 2021-06-11

## 2021-06-11 RX ORDER — BLOOD-GLUCOSE METER
KIT MISCELLANEOUS
Qty: 1 KIT | Refills: 0 | Status: SHIPPED | OUTPATIENT
Start: 2021-06-11 | End: 2022-06-20 | Stop reason: ALTCHOICE

## 2021-06-11 SDOH — ECONOMIC STABILITY: FOOD INSECURITY: WITHIN THE PAST 12 MONTHS, YOU WORRIED THAT YOUR FOOD WOULD RUN OUT BEFORE YOU GOT MONEY TO BUY MORE.: NEVER TRUE

## 2021-06-11 SDOH — ECONOMIC STABILITY: FOOD INSECURITY: WITHIN THE PAST 12 MONTHS, THE FOOD YOU BOUGHT JUST DIDN'T LAST AND YOU DIDN'T HAVE MONEY TO GET MORE.: NEVER TRUE

## 2021-06-11 ASSESSMENT — PATIENT HEALTH QUESTIONNAIRE - PHQ9
1. LITTLE INTEREST OR PLEASURE IN DOING THINGS: 0
SUM OF ALL RESPONSES TO PHQ QUESTIONS 1-9: 0
2. FEELING DOWN, DEPRESSED OR HOPELESS: 0
SUM OF ALL RESPONSES TO PHQ QUESTIONS 1-9: 0
SUM OF ALL RESPONSES TO PHQ QUESTIONS 1-9: 0
SUM OF ALL RESPONSES TO PHQ9 QUESTIONS 1 & 2: 0

## 2021-06-11 ASSESSMENT — ENCOUNTER SYMPTOMS
SHORTNESS OF BREATH: 0
ABDOMINAL PAIN: 0
APNEA: 1

## 2021-06-11 ASSESSMENT — SOCIAL DETERMINANTS OF HEALTH (SDOH): HOW HARD IS IT FOR YOU TO PAY FOR THE VERY BASICS LIKE FOOD, HOUSING, MEDICAL CARE, AND HEATING?: NOT VERY HARD

## 2021-06-11 NOTE — PROGRESS NOTES
Visit Information    Have you changed or started any medications since your last visit including any over-the-counter medicines, vitamins, or herbal medicines? no   Are you having any side effects from any of your medications? -  no  Have you stopped taking any of your medications? Is so, why? -  no    Have you seen any other physician or provider since your last visit? No  Have you had any other diagnostic tests since your last visit? No  Have you been seen in the emergency room and/or had an admission to a hospital since we last saw you? No  Have you had your routine dental cleaning in the past 6 months? no    Have you activated your [employer] account? If not, what are your barriers?  Yes     No care team member to display    Medical History Review  Past Medical, Family, and Social History reviewed and does contribute to the patient presenting condition    Health Maintenance   Topic Date Due    Hepatitis C screen  Never done    COVID-19 Vaccine (1) Never done    HIV screen  Never done    Hepatitis B vaccine (1 of 3 - Risk 3-dose series) Never done    DTaP/Tdap/Td vaccine (1 - Tdap) Never done    Shingles Vaccine (1 of 2) Never done    Colon cancer screen colonoscopy  Never done    Diabetic retinal exam  05/15/2018    Lipid screen  05/15/2018    Potassium monitoring  05/15/2018    Creatinine monitoring  05/15/2018    Diabetic foot exam  03/05/2020    A1C test (Diabetic or Prediabetic)  03/05/2020    Diabetic microalbuminuria test  03/05/2020    Flu vaccine (Season Ended) 09/01/2021    Pneumococcal 0-64 years Vaccine (2 of 2) 09/12/2031    Hepatitis A vaccine  Aged Out    Hib vaccine  Aged Out    Meningococcal (ACWY) vaccine  Aged Out

## 2021-06-11 NOTE — PATIENT INSTRUCTIONS
[email or web address]    [address]  [email or web address]:[email or web address]    [address]  [email or web address]:[email or web address]

## 2021-07-31 ENCOUNTER — HOSPITAL ENCOUNTER (OUTPATIENT)
Age: 56
Discharge: HOME | End: 2021-07-31
Payer: COMMERCIAL

## 2021-07-31 DIAGNOSIS — E11.9 TYPE 2 DIABETES MELLITUS WITHOUT COMPLICATION, WITHOUT LONG-TERM CURRENT USE OF INSULIN (HCC): ICD-10-CM

## 2021-07-31 DIAGNOSIS — R63.5 WEIGHT GAIN: ICD-10-CM

## 2021-07-31 DIAGNOSIS — I10 ESSENTIAL HYPERTENSION: ICD-10-CM

## 2021-07-31 DIAGNOSIS — E66.01 MORBID OBESITY WITH BMI OF 45.0-49.9, ADULT (HCC): ICD-10-CM

## 2021-07-31 DIAGNOSIS — Z12.5 SCREENING FOR PROSTATE CANCER: ICD-10-CM

## 2021-07-31 DIAGNOSIS — E72.11 HYPERHOMOCYSTEINEMIA (HCC): ICD-10-CM

## 2021-07-31 DIAGNOSIS — Z11.59 SCREENING FOR VIRAL DISEASE: ICD-10-CM

## 2021-07-31 DIAGNOSIS — E78.2 MIXED HYPERLIPIDEMIA: ICD-10-CM

## 2021-07-31 LAB
-: ABNORMAL
ABSOLUTE EOS #: 0.4 K/UL (ref 0–0.4)
ABSOLUTE IMMATURE GRANULOCYTE: ABNORMAL K/UL (ref 0–0.3)
ABSOLUTE LYMPH #: 2.6 K/UL (ref 1–4.8)
ABSOLUTE MONO #: 0.9 K/UL (ref 0.1–1.3)
ALBUMIN SERPL-MCNC: 4.2 G/DL (ref 3.5–5.2)
ALBUMIN/GLOBULIN RATIO: ABNORMAL (ref 1–2.5)
ALP BLD-CCNC: 113 U/L (ref 40–129)
ALT SERPL-CCNC: 27 U/L (ref 5–41)
AMORPHOUS: ABNORMAL
ANION GAP SERPL CALCULATED.3IONS-SCNC: 9 MMOL/L (ref 9–17)
AST SERPL-CCNC: 15 U/L
BACTERIA: ABNORMAL
BASOPHILS # BLD: 1 % (ref 0–2)
BASOPHILS ABSOLUTE: 0.1 K/UL (ref 0–0.2)
BILIRUB SERPL-MCNC: 0.33 MG/DL (ref 0.3–1.2)
BILIRUBIN URINE: NEGATIVE
BUN BLDV-MCNC: 33 MG/DL (ref 6–20)
BUN/CREAT BLD: ABNORMAL (ref 9–20)
CALCIUM SERPL-MCNC: 10 MG/DL (ref 8.6–10.4)
CASTS UA: ABNORMAL /LPF
CHLORIDE BLD-SCNC: 100 MMOL/L (ref 98–107)
CHOLESTEROL, FASTING: 115 MG/DL
CHOLESTEROL/HDL RATIO: 3.4
CO2: 29 MMOL/L (ref 20–31)
COLOR: YELLOW
COMMENT UA: ABNORMAL
CREAT SERPL-MCNC: 1.49 MG/DL (ref 0.7–1.2)
CRYSTALS, UA: ABNORMAL /HPF
DIFFERENTIAL TYPE: ABNORMAL
EOSINOPHILS RELATIVE PERCENT: 3 % (ref 0–4)
EPITHELIAL CELLS UA: ABNORMAL /HPF
FOLATE: 16.8 NG/ML
GFR AFRICAN AMERICAN: 60 ML/MIN
GFR NON-AFRICAN AMERICAN: 49 ML/MIN
GFR SERPL CREATININE-BSD FRML MDRD: ABNORMAL ML/MIN/{1.73_M2}
GFR SERPL CREATININE-BSD FRML MDRD: ABNORMAL ML/MIN/{1.73_M2}
GLUCOSE FASTING: 231 MG/DL (ref 70–99)
GLUCOSE URINE: ABNORMAL
HCT VFR BLD CALC: 46.2 % (ref 41–53)
HDLC SERPL-MCNC: 34 MG/DL
HEMOGLOBIN: 15.6 G/DL (ref 13.5–17.5)
HEPATITIS C ANTIBODY: NONREACTIVE
HIV AG/AB: NONREACTIVE
IMMATURE GRANULOCYTES: ABNORMAL %
KETONES, URINE: NEGATIVE
LDL CHOLESTEROL: 37 MG/DL (ref 0–130)
LEUKOCYTE ESTERASE, URINE: NEGATIVE
LYMPHOCYTES # BLD: 24 % (ref 24–44)
MCH RBC QN AUTO: 28.9 PG (ref 26–34)
MCHC RBC AUTO-ENTMCNC: 33.8 G/DL (ref 31–37)
MCV RBC AUTO: 85.6 FL (ref 80–100)
MONOCYTES # BLD: 8 % (ref 1–7)
MUCUS: ABNORMAL
NITRITE, URINE: NEGATIVE
NRBC AUTOMATED: ABNORMAL PER 100 WBC
OTHER OBSERVATIONS UA: ABNORMAL
PDW BLD-RTO: 14.9 % (ref 11.5–14.9)
PH UA: 6 (ref 5–8)
PLATELET # BLD: 225 K/UL (ref 150–450)
PLATELET ESTIMATE: ABNORMAL
PMV BLD AUTO: 8.2 FL (ref 6–12)
POTASSIUM SERPL-SCNC: 5 MMOL/L (ref 3.7–5.3)
PROSTATE SPECIFIC ANTIGEN: 2.56 UG/L
PROTEIN UA: NEGATIVE
RBC # BLD: 5.41 M/UL (ref 4.5–5.9)
RBC # BLD: ABNORMAL 10*6/UL
RBC UA: ABNORMAL /HPF
RENAL EPITHELIAL, UA: ABNORMAL /HPF
SEG NEUTROPHILS: 64 % (ref 36–66)
SEGMENTED NEUTROPHILS ABSOLUTE COUNT: 7.1 K/UL (ref 1.3–9.1)
SODIUM BLD-SCNC: 138 MMOL/L (ref 135–144)
SPECIFIC GRAVITY UA: 1.02 (ref 1–1.03)
TOTAL PROTEIN: 7.9 G/DL (ref 6.4–8.3)
TRICHOMONAS: ABNORMAL
TRIGLYCERIDE, FASTING: 222 MG/DL
TSH SERPL DL<=0.05 MIU/L-ACNC: 1.01 MIU/L (ref 0.3–5)
TURBIDITY: CLEAR
URINE HGB: ABNORMAL
UROBILINOGEN, URINE: NORMAL
VITAMIN B-12: 785 PG/ML (ref 232–1245)
VITAMIN D 25-HYDROXY: 20.3 NG/ML (ref 30–100)
VLDLC SERPL CALC-MCNC: ABNORMAL MG/DL (ref 1–30)
WBC # BLD: 11.1 K/UL (ref 3.5–11)
WBC # BLD: ABNORMAL 10*3/UL
WBC UA: ABNORMAL /HPF
YEAST: ABNORMAL

## 2021-07-31 PROCEDURE — 84443 ASSAY THYROID STIM HORMONE: CPT

## 2021-07-31 PROCEDURE — 86787 VARICELLA-ZOSTER ANTIBODY: CPT

## 2021-07-31 PROCEDURE — 81001 URINALYSIS AUTO W/SCOPE: CPT

## 2021-07-31 PROCEDURE — 36415 COLL VENOUS BLD VENIPUNCTURE: CPT

## 2021-07-31 PROCEDURE — 82746 ASSAY OF FOLIC ACID SERUM: CPT

## 2021-07-31 PROCEDURE — 87389 HIV-1 AG W/HIV-1&-2 AB AG IA: CPT

## 2021-07-31 PROCEDURE — 80061 LIPID PANEL: CPT

## 2021-07-31 PROCEDURE — 82306 VITAMIN D 25 HYDROXY: CPT

## 2021-07-31 PROCEDURE — G0103 PSA SCREENING: HCPCS

## 2021-07-31 PROCEDURE — 86803 HEPATITIS C AB TEST: CPT

## 2021-07-31 PROCEDURE — 80053 COMPREHEN METABOLIC PANEL: CPT

## 2021-07-31 PROCEDURE — 85025 COMPLETE CBC W/AUTO DIFF WBC: CPT

## 2021-07-31 PROCEDURE — 82607 VITAMIN B-12: CPT

## 2021-08-01 ENCOUNTER — TELEPHONE (OUTPATIENT)
Dept: FAMILY MEDICINE CLINIC | Age: 56
End: 2021-08-01

## 2021-08-01 DIAGNOSIS — E55.9 VITAMIN D DEFICIENCY: Primary | ICD-10-CM

## 2021-08-02 LAB — VZV IGG SER QL IA: 2.11

## 2021-08-02 NOTE — TELEPHONE ENCOUNTER
Pt informed.
07/31/2021 10:37 AM         Patient's lipids test results still elevated, continue lipitor, may increase  Lab Results   Component Value Date/Time    CHOLFAST 115 07/31/2021 10:37 AM    CHOL 181 05/15/2017 09:30 AM    HDL 34 (L) 07/31/2021 10:37 AM    LDLCHOLESTEROL 37 07/31/2021 10:37 AM    TRIG 186 (H) 05/15/2017 09:30 AM    CHOLHDLRATIO 3.4 07/31/2021 10:37 AM    VLDL NOT REPORTED (H) 07/31/2021 10:37 AM       Patient's Hepatitis C screening came back negative. Lab Results   Component Value Date/Time    HEPCAB NONREACTIVE 07/31/2021 10:37 AM       Patient's HIV screening results came back negative.    Lab Results   Component Value Date/Time    HIVAG/AB NONREACTIVE 07/31/2021 10:37 AM

## 2021-08-13 PROBLEM — N18.31 STAGE 3A CHRONIC KIDNEY DISEASE (HCC): Status: ACTIVE | Noted: 2021-08-13

## 2021-08-13 ASSESSMENT — ENCOUNTER SYMPTOMS
SHORTNESS OF BREATH: 0
ABDOMINAL PAIN: 0
APNEA: 1

## 2021-08-13 NOTE — PROGRESS NOTES
[facility] PHYSICIANS  [facility] PHYSICIANS [facility]  [address]  [address] [id number]  Dept: Aaliyah Cruz (:  1965) is a 48 y.o. male. Patient is here for evaluation of the following chief complaint(s):  Chief Complaint   Patient presents with    Hypertension    Diabetes        SUBJECTIVE/OBJECTIVE:  HPI  Asiya Rosen is a 48 y.o. male patient. Patient is a new patient. Patient has a known history of hypertesnsion, Diabetes Mellitus, hyperlipidemia, asthma, Jenny, polyneorupathy, hyperchromocysteinemia, current smoker, and morbid obesity. Patient is also here today to discuss lab results. DERMATITIS  Patient with erythematous rash on both arms worse on the left. Has never used any type of creams before. He has had this for a while seem to be worsening but does not bother him at all. HYPERTENSION  Asiya Rosen has a well controlled hypertension. he is currently on lisinopril, HCTZ. Patient's most recent BP in the office was stable. he reports stable BP readings at home. Patient denies any adverse reaction to this therapy. he denies any CP, SOB, HA, or palpitations. Patient admits to exercising and adheres to low salt diet. No history of organ damage due to condition noted. Patient states that the hydrochlorothiazide is helping with the peripheral edema. Lab Results   Component Value Date/Time    CREATININE 1.49 (H) 2021 10:37 AM     CHRONIC KIDNEY DISEASE  [facility]. Patient's creatinine level had slowly increased through the years patient is encouraged to stop all NSAIDs drink more fluids cut down on salt. He declined to see the nephrologist for now since he really wants to bring his A1c down.   Lab Results   Component Value Date/Time    K 5.0 2021 10:37 AM    BUN 33 (H) 2021 10:37 AM    CREATININE 1.49 (H) 2021 10:37 AM    LABGLOM 49 (L) 2021 10:37 AM    GFRAA 60 (L) 2021 10:37 AM      DIABETES MELLITUS/POLYNEUROPATHY /PVD  Patient has a  stable Diabetes Mellitus. Current therapy includes Metformin, Glimeperide will start Rybelsus. Patient is responding poorly with this therapy. Patient reports home glucose monitoring as Stable readings. Patient denieshypoglycemia episodes such as{symptoms. Patient denies neither vomiting nor diarrhea. Did not get glucometer. Patient has a known polyneuropathy. No treatment currently  Patient also has some peripheral vascular disease. We will trial him on some compression stocking. Eye Exam: due  Foot Exam:due  Lab Results   Component Value Date/Time    LABA1C 8.6 08/16/2021 11:32 AM    LABA1C 11.4 03/05/2019 03:30 PM      HYPERLIPIDEMIA  Bianka Johnson is currently on atorvastatin (Lipitor). Triglycerides still elevated we will continue to evaluate patient denies adverse reaction to this medication. Compliance with treatment thus far has been good. The patient is not known to have coexisting coronary artery disease. The 10-year CVD risk score (FILI'Agostino, et al., 2008) is: 29%    Values used to calculate the score:      Age: 48 years      Sex: Male      Diabetic: Yes      Tobacco smoker: Yes      Systolic Blood Pressure: 641 mmHg      Is BP treated: Yes      HDL Cholesterol: 34 mg/dL      Total Cholesterol: 115 mg/dL  Lab Results   Component Value Date/Time    CHOLFAST 115 07/31/2021 10:37 AM    CHOL 181 05/15/2017 09:30 AM    HDL 34 (L) 07/31/2021 10:37 AM    LDLCHOLESTEROL 37 07/31/2021 10:37 AM    TRIGLYCFAST 222 (H) 07/31/2021 10:37 AM    CHOLHDLRATIO 3.4 07/31/2021 10:37 AM    TRIG 186 (H) 05/15/2017 09:30 AM    VLDL NOT REPORTED (H) 07/31/2021 10:37 AM     SLEEP APNEA   Patient uses CPAP for MIGUEL. Adryan Rodriguez is compliant with CPAP use. he reports success with therapy. he denies morning fatigue. Patient states that he is not able to sleep without his CPAP and have been very adherent    CURRENT SMOKER  Long time history of smoking. Adryan Rodriguez is trying to cut down on smoking. 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 Patient is willing to try it Wellbutrin we will be discussing this further after he gets better A1c readings several unsuccessful attempts to quit in past.Nathan  does not have any plans to quit. We will continue to discuss different ways to quit. Resources provided. ARTHRITIS  Patient also complains of occasional joint pain especially in his back. He does have a known history of arthritis. Patient was taking some occasional NSAIDs. Patient is encouraged to not do the NSAIDs due to the chronic kidney disease will be given some Tylenol. We will continue to discuss other pain control options. OBESITY  Patient's BMI is Body mass index is 44.76 kg/m². kg/m2. BMI is increasing. Used to be 400 lbs. had lost some weight but fluctuates quite often. Patient understands that this condition increases the patient's risk for chronic conditions. Patient advised to practice healthy eating habits. Diet should include plenty of fruits, vegetables, whole grains, lean protein, and low-fat dairy. Increase water consumption. Reduce consumption of dietary sugar and sugar-sweetened beverages. Increasing physical exercises at least 3-4 times a week. We will monitor progression of condition. Wt Readings from Last 3 Encounters:   08/16/21 (!) 330 lb (149.7 kg)   06/11/21 (!) 340 lb (154.2 kg)   03/05/19 (!) 338 lb (153.3 kg)     VITAMIN D  Patient has a known Vitamin D Deficiency. he had a course of supplementation for 12 weeks. he is due to get levels check. We continue to discuss ways to manage this condition. We also discussed ways to improve the levels. Such as learning food groups that are high in Vitamin D. We also discuss that sun exposure is beneficial however, too much sun and sun damage can potentially result in skin cancer.    Lab Results   Component Value Date/Time    VITD25 20.3 (L) 07/31/2021 10:37 AM      Patient is due for colon cancer screening. Thu Tatum denies  nausea, vomiting, diarrhea, constipation, blood in the stool or abdominal pain. We discussed options, she would like to have: Mendy. Patient's blood count is WNL. Mild elevation in white count differential was normal  Lab Results   Component Value Date    WBC 11.1 (H) 07/31/2021    HGB 15.6 07/31/2021    HCT 46.2 07/31/2021    MCV 85.6 07/31/2021     07/31/2021    LYMPHOPCT 24 07/31/2021    RBC 5.41 07/31/2021    MCH 28.9 07/31/2021    MCHC 33.8 07/31/2021    RDW 14.9 07/31/2021       THYROID FUNCTION  Nathan Lyman  's most recent TSH level was normal. Results reviewed with the patient. Patient denies any history of thyroid disorders. Lab Results   Component Value Date/Time    TSH 1.01 07/31/2021 10:37 AM        Nathan 's RENAL FUNCTION was found WNL. CKD stage III  Lab Results   Component Value Date     07/31/2021    K 5.0 07/31/2021     07/31/2021    CO2 29 07/31/2021    BUN 33 (H) 07/31/2021    CREATININE 1.49 (H) 07/31/2021    GLUCOSE 186 (H) 05/15/2017    CALCIUM 10.0 07/31/2021    PROT 7.9 07/31/2021    GFR      07/31/2021    GFR NOT REPORTED 07/31/2021     LFT's WNL    Lab Results   Component Value Date/Time    ALKPHOS 113 07/31/2021 10:37 AM    ALT 27 07/31/2021 10:37 AM    AST 15 07/31/2021 10:37 AM    BILITOT 0.33 07/31/2021 10:37 AM    PROT 7.9 07/31/2021 10:37 AM    LABALBU 4.2 07/31/2021 10:37 AM      Patient's urinalysis results glucosuria due to diabetes. Lab Results   Component Value Date    COLORU YELLOW 07/31/2021    NITRU NEGATIVE 07/31/2021    GLUCOSEU 2+ 07/31/2021    KETUA NEGATIVE 07/31/2021    UROBILINOGEN Normal 07/31/2021    BILIRUBINUR NEGATIVE 07/31/2021      Patient's Hepatitis C screening came back negative. Lab Results   Component Value Date/Time    HEPCAB NONREACTIVE 07/31/2021 10:37 AM       Patient's HIV screening results came back negative.    Lab Results   Component Value Date/Time    HIVAG/AB NONREACTIVE 07/31/2021 10:37 AM       Patient's Varicella titers came back normal. There will not be any need for any booster dose. Lab Results   Component Value Date/Time    VARICELABIGG 2.11 07/31/2021 10:37 AM      Vitals:    08/16/21 1112   BP: 120/80   Pulse: 85   Temp: 97.2 °F (36.2 °C)   SpO2: 98%   Weight: (!) 330 lb (149.7 kg)   Height: 6' (1.829 m)   Estimated body mass index is 44.76 kg/m² as calculated from the following:    Height as of this encounter: 6' (1.829 m). Weight as of this encounter: 330 lb (149.7 kg). Review of Systems   Constitutional: Positive for activity change and unexpected weight change (losing weight). Negative for chills and fever. HENT: Negative. Respiratory: Positive for apnea (sleep). Negative for shortness of breath. Cardiovascular: Negative. Negative for chest pain and palpitations. Gastrointestinal: Negative for abdominal pain. Endocrine: Negative. Genitourinary: Negative for dysuria. Musculoskeletal: Positive for arthralgias, back pain and joint swelling. Negative for myalgias. Skin: Negative for rash. Allergic/Immunologic: Positive for environmental allergies. Neurological: Negative for headaches. Psychiatric/Behavioral: Positive for sleep disturbance (sleep apnea). Negative for dysphoric mood and suicidal ideas. The patient is nervous/anxious. Physical Exam  Vitals and nursing note reviewed. Constitutional:       Appearance: He is well-developed. He is morbidly obese. HENT:      Head: Normocephalic. Right Ear: External ear normal.      Left Ear: External ear normal.      Nose: Nose normal.      Mouth/Throat:      Mouth: Mucous membranes are moist.   Eyes:      Pupils: Pupils are equal, round, and reactive to light. Cardiovascular:      Rate and Rhythm: Normal rate and regular rhythm. Pulses: Normal pulses. Dorsalis pedis pulses are 2+ on the right side and 2+ on the left side.         Posterior tibial pulses are 2+ on the right side and 2+ on the left side. Heart sounds: Normal heart sounds. Comments: Hyperpigmented areas on medial side of both ankles. Pulmonary:      Effort: Pulmonary effort is normal. No respiratory distress. Breath sounds: Normal breath sounds. Abdominal:      General: Abdomen is protuberant. Bowel sounds are normal. There is no distension. Palpations: Abdomen is soft. Tenderness: There is no abdominal tenderness. Comments: Morbidly obese   Musculoskeletal:         General: Normal range of motion. Cervical back: Normal range of motion and neck supple. Skin:     General: Skin is warm and dry. Capillary Refill: Capillary refill takes less than 2 seconds. Findings: Erythema and rash present. Rash is papular. Neurological:      Mental Status: He is alert and oriented to person, place, and time. Psychiatric:         Mood and Affect: Mood is anxious. Speech: Speech is rapid and pressured. Behavior: Behavior normal.         Thought Content: Thought content does not include homicidal or suicidal ideation. Diagnosis Date    Anesthesia     NEVER HAD, FEARFUL HE'LL WAKE UP DISORIENTED & HURT SOMEONE.  Diabetes mellitus ([address]     Hypertension     Sleep apnea     CPAP MACHINE AT NIGHT.  Umbilical hernia     Wears dentures     UPPER       Prior to Visit Medications    Medication Sig Taking? Authorizing Provider   Semaglutide 3 MG TABS Take 1 tablet by mouth daily Take on an empty stomach with 4 oz water. Wait 30 minutes before taking anything else.  Yes ELDA Ruiz CNP   acetaminophen (TYLENOL) 500 MG tablet Take 1 tablet by mouth every 6 hours as needed for Pain (Take 1-2 tablets every 6 hours) Yes ELDA Ruiz CNP   Mometasone Furo-Formoterol Fum 50-5 MCG/ACT AERO Inhale 2 puffs into the lungs 2 times daily Yes ELDA Ruiz CNP   betamethasone valerate (VALISONE) 0.1 % cream Apply topically 2 times daily. Yes ELDA Ruiz CNP   Cholecalciferol 1.25 MG (70461 UT) TABS Take 1 tablet by mouth once a week Yes ELDA Ruiz CNP   atorvastatin (LIPITOR) 20 MG tablet TAKE 1 TABLET BY MOUTH ONCE DAILY Yes ELDA Ruiz CNP   glimepiride (AMARYL) 4 MG tablet Take 1 tablet by mouth 2 times daily Yes ELDA Ruiz CNP   hydroCHLOROthiazide (HYDRODIURIL) 25 MG tablet Take 1 tablet by mouth daily Yes ELDA Ruiz CNP   lisinopril (PRINIVIL;ZESTRIL) 5 MG tablet Take 1 tablet by mouth daily Yes ELDA Ruiz CNP   metFORMIN (GLUCOPHAGE) 1000 MG tablet Take 1 tablet by mouth 2 times daily (with meals) Yes ELDA Ruiz CNP   blood glucose monitor strips Test 2-3 times a day & as needed for symptoms of irregular blood glucose. BRAND OF CHOICE INSURANCE ALLOWS. Yes ELDA Ruiz CNP   Alcohol Swabs (ALCOHOL PREP) PADS Use as directed Yes ELDA Ruiz CNP   Lancets MISC 1 each by Does not apply route 2 times daily Yes ELDA Ruiz CNP   glucose monitoring kit (FREESTYLE) monitoring kit Use as directed. BRAND OF CHOICE INSURANCE ALLOWS. Yes ELDA Ruiz CNP   aspirin 81 MG chewable tablet Take 1 tablet by mouth daily Yes ELDA Ruiz CNP   albuterol sulfate  (90 Base) MCG/ACT inhaler Inhale 2 puffs into the lungs every 6 hours as needed for Wheezing Yes ELDA Ruiz CNP   Multiple Vitamins-Minerals (THERAPEUTIC MULTIVITAMIN-MINERALS) tablet Take 1 tablet by mouth daily Yes Historical Provider, MD   b complex vitamins capsule Take 1 capsule by mouth daily Yes Historical Provider, MD       ASSESSMENT/PLAN:  1. Moderate asthma without complication, unspecified whether persistent  Worsening  Added dulera  PFT next visit  Continue current therapy. DISCUSSED and ADVISED TO:  Use prescribed inhalers or medications regularly.   Avoid known irritants and exposure to known triggers. Advised to report the need to use your albuterol inhaler more than usual  Stay away from smoking or second hand smoke. Go to the nearest ER for increasing SOB. - Mometasone Furo-Formoterol Fum 50-5 MCG/ACT AERO; Inhale 2 puffs into the lungs 2 times daily  Dispense: 2 Inhaler; Refill: 2    2. Essential hypertension  Stable  Continue current therapy. DISCUSSED AND ADVISED TO:  Cut down on your salt intake. Cut down on caffeinated drinks, sports drinks. Instructed to check BP at home regularly. Report for any chest pains, shortness of breath, headaches, and lightheadedness. Call the office if your blood pressure continue to be higher than 140/90 or 90/50. 3. Stage 3a chronic kidney disease ([facility] [address]  Failure to Improve  Declined nephrology follow up  Continue renal consult  Advised to increase fluid intake  Report decrease urine output  Decrease salt intake      4. Type 2 diabetes mellitus with diabetic polyneuropathy, without long-term current use of insulin (HCC)  Improving  Continue therapy. We will continue to monitor Hemoglobin A1c   DISCUSSED and ADVISED TO:  Continue to check Glucose levels at home. Report increased and low levels as discussed. Decrease carbohydrates, sugary drinks, desserts in your diet. Exercise regularly, as tolerated. Try to lose weight.      - POCT glycosylated hemoglobin (Hb A1C)  - Semaglutide 3 MG TABS; Take 1 tablet by mouth daily Take on an empty stomach with 4 oz water. Wait 30 minutes before taking anything else. Dispense: 30 tablet; Refill: 0    5. Mixed hyperlipidemia  Stable  Continue therapy. Advised to decrease the consumption of red meats, fried foods, trans fats, sweets, sugary beverages. Advised to increase fish, vegetables, and fruits consumption. Advised to add fiber or OTC supplements in diet. Discussed weight loss which will result in improvement of lipids levels.   Advised to increase daily physical activities and add regular exercises. 6. PVD (peripheral vascular disease) ([address]  Failure to Improve  DISCUSSED AND ADVISED TO:  Raise legs above the swollen area when resting. Take frequent breaks from standing and sitting positions. Walk around to promote blood flow to legs. Wear support stockings as discussed   Cut down salt on diet. - Compression Stocking    7. Arthritis of multiple sites  Failure to Improve  Continue current therapy. DISCUSSED and ADVISED TO:  Stay at a healthy weight. Continue exercises/PT  Stretch to help prevent stiffness and to prevent injury before exercise. Gentle forms of yoga help keep joints and muscles flexible. Walk instead of jog, ride a bike, swim, and water exercise. Lift weights as tolerated. strong muscles help reduce stress on joints. Take pain medicines exactly as directed and only as needed. - acetaminophen (TYLENOL) 500 MG tablet; Take 1 tablet by mouth every 6 hours as needed for Pain (Take 1-2 tablets every 6 hours)  Dispense: 120 tablet; Refill: 1    8. Dermatitis  Failure to Improve  Start bethamethasone  DISCUSSED AND ADVISED TO:  Use hypoallergenic creams, soaps, lotions,   Report for worsening symptoms    Use an emollient instead of regular lotion. If there is no emollient available, use any hypoallergenic lotions. Emollients and or lotions are best used after a warm shower. Do not apply to skin with cuts or bruises  EXAMPLES  Cetaphil  Cerave  Eucerin  Gold Bond     - betamethasone valerate (VALISONE) 0.1 % cream; Apply topically 2 times daily. Dispense: 45 g; Refill: 2    9. Vitamin D deficiency  Continue Vitamin D supplementation  DISCUSSED AND ADVISED TO:  Foods that contain a lot of vitamin D includes [place], tuna, and mackerel. Cheese, egg yolks, and beef liver have small amounts of vit D.  Milk, soy drinks, orange juice, yogurt, margarine, and some kinds of cereal have vitamin D added to them.   Continue to use sunblock when out in the sun to prevent skin cancer. 10. Colon cancer screening  Recommended    - Cologuard; Future      Return in about 3 months (around 11/16/2021) for Chronic conditions, 30mins. On this date 8/16/2021 I have spent 40 minutes reviewing previous notes, test results and face to face with the patient discussing the diagnosis and importance of compliance with the treatment plan as well as documenting on the day of the visit. This note was completed by using the assistance of a speech-recognition program. However, inadvertent computerized transcription errors may be present. Although every effort was made to ensure accuracy, no guarantees can be provided that every mistake has been identified and corrected by editing.   Electronically signed by ELDA Ratliff CNP on 6/8/21 at 7:20 PM EDT     --ELDA Ratliff CNP

## 2021-08-16 ENCOUNTER — OFFICE VISIT (OUTPATIENT)
Dept: FAMILY MEDICINE CLINIC | Age: 56
End: 2021-08-16
Payer: COMMERCIAL

## 2021-08-16 VITALS
WEIGHT: 315 LBS | TEMPERATURE: 97.2 F | DIASTOLIC BLOOD PRESSURE: 80 MMHG | SYSTOLIC BLOOD PRESSURE: 120 MMHG | HEART RATE: 85 BPM | HEIGHT: 72 IN | BODY MASS INDEX: 42.66 KG/M2 | OXYGEN SATURATION: 98 %

## 2021-08-16 DIAGNOSIS — Z12.11 COLON CANCER SCREENING: ICD-10-CM

## 2021-08-16 DIAGNOSIS — N18.31 STAGE 3A CHRONIC KIDNEY DISEASE (HCC): ICD-10-CM

## 2021-08-16 DIAGNOSIS — M13.0 ARTHRITIS OF MULTIPLE SITES: ICD-10-CM

## 2021-08-16 DIAGNOSIS — J45.909 MODERATE ASTHMA WITHOUT COMPLICATION, UNSPECIFIED WHETHER PERSISTENT: Primary | ICD-10-CM

## 2021-08-16 DIAGNOSIS — E78.2 MIXED HYPERLIPIDEMIA: ICD-10-CM

## 2021-08-16 DIAGNOSIS — I73.9 PVD (PERIPHERAL VASCULAR DISEASE) (HCC): ICD-10-CM

## 2021-08-16 DIAGNOSIS — E55.9 VITAMIN D DEFICIENCY: ICD-10-CM

## 2021-08-16 DIAGNOSIS — L30.9 DERMATITIS: ICD-10-CM

## 2021-08-16 DIAGNOSIS — I10 ESSENTIAL HYPERTENSION: ICD-10-CM

## 2021-08-16 DIAGNOSIS — E11.42 TYPE 2 DIABETES MELLITUS WITH DIABETIC POLYNEUROPATHY, WITHOUT LONG-TERM CURRENT USE OF INSULIN (HCC): ICD-10-CM

## 2021-08-16 PROBLEM — M19.90 ARTHRITIS: Status: ACTIVE | Noted: 2021-08-16

## 2021-08-16 LAB — HBA1C MFR BLD: 8.6 %

## 2021-08-16 PROCEDURE — 83036 HEMOGLOBIN GLYCOSYLATED A1C: CPT | Performed by: FAMILY MEDICINE

## 2021-08-16 PROCEDURE — 99214 OFFICE O/P EST MOD 30 MIN: CPT | Performed by: FAMILY MEDICINE

## 2021-08-16 PROCEDURE — 3052F HG A1C>EQUAL 8.0%<EQUAL 9.0%: CPT | Performed by: FAMILY MEDICINE

## 2021-08-16 RX ORDER — ACETAMINOPHEN 500 MG
500 TABLET ORAL EVERY 6 HOURS PRN
Qty: 120 TABLET | Refills: 1 | Status: SHIPPED | OUTPATIENT
Start: 2021-08-16 | End: 2022-06-20

## 2021-08-16 ASSESSMENT — ENCOUNTER SYMPTOMS: BACK PAIN: 1

## 2021-09-01 DIAGNOSIS — E11.9 TYPE 2 DIABETES MELLITUS WITHOUT COMPLICATION, WITHOUT LONG-TERM CURRENT USE OF INSULIN (HCC): ICD-10-CM

## 2021-09-01 DIAGNOSIS — I10 ESSENTIAL HYPERTENSION: ICD-10-CM

## 2021-09-01 RX ORDER — LISINOPRIL 5 MG/1
TABLET ORAL
Qty: 90 TABLET | Refills: 0 | Status: SHIPPED | OUTPATIENT
Start: 2021-09-01 | End: 2022-04-04

## 2021-09-01 RX ORDER — GLIMEPIRIDE 4 MG/1
TABLET ORAL
Qty: 60 TABLET | Refills: 0 | Status: SHIPPED | OUTPATIENT
Start: 2021-09-01 | End: 2021-10-25

## 2021-09-01 NOTE — TELEPHONE ENCOUNTER
Please Approve or Refuse.   Send to Pharmacy per Pt's Request:     Next Visit Date:  11/16/2021   Last Visit Date: 8/16/2021    Hemoglobin A1C (%)   Date Value   08/16/2021 8.6   03/05/2019 11.4   08/23/2018 11             ( goal A1C is < 7)   BP Readings from Last 3 Encounters:   08/16/21 120/80   06/11/21 138/80   03/05/19 116/64          (goal 120/80)  BUN   Date Value Ref Range Status   07/31/2021 33 (H) 6 - 20 mg/dL Final     CREATININE   Date Value Ref Range Status   07/31/2021 1.49 (H) 0.70 - 1.20 mg/dL Final     Potassium   Date Value Ref Range Status   07/31/2021 5.0 3.7 - 5.3 mmol/L Final

## 2021-10-20 DIAGNOSIS — Z12.11 COLON CANCER SCREENING: ICD-10-CM

## 2021-10-24 DIAGNOSIS — E11.9 TYPE 2 DIABETES MELLITUS WITHOUT COMPLICATION, WITHOUT LONG-TERM CURRENT USE OF INSULIN (HCC): ICD-10-CM

## 2021-10-24 DIAGNOSIS — E78.2 MIXED HYPERLIPIDEMIA: ICD-10-CM

## 2021-10-25 DIAGNOSIS — E11.42 TYPE 2 DIABETES MELLITUS WITH DIABETIC POLYNEUROPATHY, WITHOUT LONG-TERM CURRENT USE OF INSULIN (HCC): ICD-10-CM

## 2021-10-25 RX ORDER — GLIMEPIRIDE 4 MG/1
TABLET ORAL
Qty: 60 TABLET | Refills: 0 | Status: SHIPPED | OUTPATIENT
Start: 2021-10-25 | End: 2021-11-07 | Stop reason: SDUPTHER

## 2021-10-25 RX ORDER — ATORVASTATIN CALCIUM 20 MG/1
TABLET, FILM COATED ORAL
Qty: 30 TABLET | Refills: 0 | Status: SHIPPED | OUTPATIENT
Start: 2021-10-25 | End: 2021-11-07 | Stop reason: SDUPTHER

## 2021-10-25 NOTE — TELEPHONE ENCOUNTER
Please Approve or Refuse.   Send to Pharmacy per Pt's Request:      Next Visit Date:  12/13/2021   Last Visit Date: 8/16/2021    Hemoglobin A1C (%)   Date Value   08/16/2021 8.6   03/05/2019 11.4   08/23/2018 11             ( goal A1C is < 7)   BP Readings from Last 3 Encounters:   08/16/21 120/80   06/11/21 138/80   03/05/19 116/64          (goal 120/80)  BUN   Date Value Ref Range Status   07/31/2021 33 (H) 6 - 20 mg/dL Final     CREATININE   Date Value Ref Range Status   07/31/2021 1.49 (H) 0.70 - 1.20 mg/dL Final     Potassium   Date Value Ref Range Status   07/31/2021 5.0 3.7 - 5.3 mmol/L Final

## 2021-11-07 DIAGNOSIS — E11.42 TYPE 2 DIABETES MELLITUS WITH DIABETIC POLYNEUROPATHY, WITHOUT LONG-TERM CURRENT USE OF INSULIN (HCC): ICD-10-CM

## 2021-11-07 DIAGNOSIS — E11.9 TYPE 2 DIABETES MELLITUS WITHOUT COMPLICATION, WITHOUT LONG-TERM CURRENT USE OF INSULIN (HCC): ICD-10-CM

## 2021-11-07 DIAGNOSIS — I10 ESSENTIAL HYPERTENSION: ICD-10-CM

## 2021-11-07 DIAGNOSIS — J45.20 MILD INTERMITTENT ASTHMA WITHOUT COMPLICATION: ICD-10-CM

## 2021-11-07 DIAGNOSIS — E78.2 MIXED HYPERLIPIDEMIA: ICD-10-CM

## 2021-11-08 RX ORDER — CHOLECALCIFEROL (VITAMIN D3) 1250 MCG
CAPSULE ORAL
Qty: 12 CAPSULE | Refills: 0 | Status: SHIPPED | OUTPATIENT
Start: 2021-11-08 | End: 2022-06-20 | Stop reason: ALTCHOICE

## 2021-11-08 RX ORDER — HYDROCHLOROTHIAZIDE 25 MG/1
25 TABLET ORAL DAILY
Qty: 90 TABLET | Refills: 1 | Status: SHIPPED | OUTPATIENT
Start: 2021-11-08

## 2021-11-08 RX ORDER — ORAL SEMAGLUTIDE 3 MG/1
TABLET ORAL
Qty: 90 TABLET | Refills: 1 | Status: SHIPPED | OUTPATIENT
Start: 2021-11-08 | End: 2022-04-04

## 2021-11-08 RX ORDER — GLIMEPIRIDE 4 MG/1
TABLET ORAL
Qty: 180 TABLET | Refills: 1 | Status: SHIPPED | OUTPATIENT
Start: 2021-11-08 | End: 2022-06-20 | Stop reason: ALTCHOICE

## 2021-11-08 RX ORDER — ATORVASTATIN CALCIUM 20 MG/1
TABLET, FILM COATED ORAL
Qty: 90 TABLET | Refills: 1 | Status: SHIPPED | OUTPATIENT
Start: 2021-11-08

## 2021-11-08 RX ORDER — ALBUTEROL SULFATE 90 UG/1
2 AEROSOL, METERED RESPIRATORY (INHALATION) EVERY 6 HOURS PRN
Qty: 9 EACH | Refills: 0 | Status: SHIPPED | OUTPATIENT
Start: 2021-11-08 | End: 2022-06-20 | Stop reason: SDUPTHER

## 2021-12-12 PROBLEM — J45.909 MODERATE ASTHMA WITHOUT COMPLICATION: Status: ACTIVE | Noted: 2021-12-12

## 2021-12-12 PROBLEM — M13.0 ARTHRITIS OF MULTIPLE SITES: Status: ACTIVE | Noted: 2021-08-16

## 2021-12-22 ENCOUNTER — NURSE TRIAGE (OUTPATIENT)
Dept: OTHER | Facility: CLINIC | Age: 56
End: 2021-12-22

## 2021-12-22 NOTE — TELEPHONE ENCOUNTER
[facility] [employer]. Subjective: Caller states \"head congestion h/a,body aches, coughing , sore throat\"     Current Symptoms: cold and congestion symtoms started yesterday no cp occas sob but smokes, runny nose    Onset: 1 day ago; gradual    Associated Symptoms: appears colicky, NA    Pain Severity: 4/10; aching; intermittent    Temperature: unsure     What has been tried: nyquil    LMP: NA Pregnant: NA    Recommended disposition: be seen in 24 hours    Care advice provided, patient verbalizes understanding; denies any other questions or concerns; instructed to call back for any new or worsening symptoms. Patient/caller proceeding to nearest [facility]      Attention Provider: Thank you for allowing me to participate in the care of your patient. The patient was connected to triage in response to information provided to the ECC/PSC. Please do not respond through this encounter as the response is not directed to a shared pool.     Reminders:     Call [address] Provider/Office    Care Advice - both instruction and documentation    Routing - PCP (and NP for 2nd level triage)    PLEASE DELETE ALL RED TEXT PRIOR TO SIGNING NOTE      Reason for Disposition   [1] Sinus pain (not just congestion) AND [2] fever    Protocols used: COMMON COLD-ADULT-AH

## 2022-02-23 ENCOUNTER — TELEPHONE (OUTPATIENT)
Dept: FAMILY MEDICINE CLINIC | Age: 57
End: 2022-02-23

## 2022-02-24 NOTE — TELEPHONE ENCOUNTER
Called patient about his missed appt that was scheduled VV today, stated that he has to call back once he gets his work schedule situated since he does travel out of state for work. Also he did state that last month in [place], he did go to [facility] urgent care, to get a physical completed and his A1C that day was 7.0. Please advise.

## 2022-04-04 DIAGNOSIS — I10 ESSENTIAL HYPERTENSION: ICD-10-CM

## 2022-04-04 DIAGNOSIS — E11.9 TYPE 2 DIABETES MELLITUS WITHOUT COMPLICATION, WITHOUT LONG-TERM CURRENT USE OF INSULIN (HCC): ICD-10-CM

## 2022-04-04 DIAGNOSIS — E11.42 TYPE 2 DIABETES MELLITUS WITH DIABETIC POLYNEUROPATHY, WITHOUT LONG-TERM CURRENT USE OF INSULIN (HCC): ICD-10-CM

## 2022-04-04 RX ORDER — ORAL SEMAGLUTIDE 7 MG/1
TABLET ORAL
Qty: 30 TABLET | Refills: 0 | Status: SHIPPED | OUTPATIENT
Start: 2022-04-04 | End: 2022-06-20 | Stop reason: SDUPTHER

## 2022-04-04 RX ORDER — LISINOPRIL 5 MG/1
TABLET ORAL
Qty: 30 TABLET | Refills: 0 | Status: SHIPPED | OUTPATIENT
Start: 2022-04-04 | End: 2022-07-08

## 2022-05-20 DIAGNOSIS — J45.20 MILD INTERMITTENT ASTHMA WITHOUT COMPLICATION: ICD-10-CM

## 2022-05-20 RX ORDER — ALBUTEROL SULFATE 90 UG/1
AEROSOL, METERED RESPIRATORY (INHALATION)
Qty: 9 G | Refills: 0 | OUTPATIENT
Start: 2022-05-20

## 2022-06-18 ASSESSMENT — ENCOUNTER SYMPTOMS
BACK PAIN: 1
APNEA: 1
SHORTNESS OF BREATH: 0
ABDOMINAL PAIN: 0

## 2022-06-19 NOTE — PROGRESS NOTES
[facility] PHYSICIANS  [facility] PHYSICIANS [facility]  [address]  [address] [address]  Dept: Aaliyah Cruz (:  1965) is a 55 y.o. male. Patient is here for evaluation of the following chief complaint(s):  Chief Complaint   Patient presents with    Diabetes    Tinnitus     Ususally the left ear, been going on for about a month or so. SUBJECTIVE/OBJECTIVE:  HUNG Ríos is a 55 y.o. male patient. Patient is a new patient. Patient has a known history of Tinnitus, hypertesnsion, Diabetes Mellitus, hyperlipidemia, asthma, MIGUEL, polyneuropathy, current smoker, and morbid obesity. Have not been seen for awhile. Switched truck company, Now making more money. HYPERTENSION/ PERIPHERAL EDEMA. Shama Ríos has a well controlled hypertension. he is currently on lisinopril, HCTZ. Patient's most recent BP in the office was stable. he reports stable BP readings at home. Patient denies any adverse reaction to this therapy. he denies any CP, SOB, HA, or palpitations. Patient admits to exercising and adheres to low salt diet. No history of organ damage due to condition noted. Patient states that the hydrochlorothiazide is helping with the peripheral edema. Lab Results   Component Value Date/Time    CREATININE 1.49 (H) 2021 10:37 AM     CHRONIC KIDNEY DISEASE  Kumar Lofton has a stage 3A CKD. Patient's creatinine level had slowly increased through the years patient is encouraged to stop all NSAIDs drink more fluids cut down on salt. He declined to see the nephrologist for now since he really wants to bring his A1c down. no changes  Lab Results   Component Value Date/Time    K 5.0 2021 10:37 AM    BUN 33 (H) 2021 10:37 AM    CREATININE 1.49 (H) 2021 10:37 AM    LABGLOM 49 (L) 2021 10:37 AM    GFRAA 60 (L) 2021 10:37 AM      DIABETES MELLITUS/POLYNEUROPATHY /PVD  Patient has a  stable Diabetes Mellitus.  Current therapy includes Metformin-continue-, (Glimeperide- will stop), start rebelsus and Jardiance. Patient is responding poorly with this therapy. Patient reports home glucose monitoring as Stable readings. Patient denieshypoglycemia episodes such as{symptoms. Patient denies neither vomiting nor diarrhea. Did not get glucometer. Patient has a known polyneuropathy. No treatment currently  Patient also has some peripheral vascular disease. We will trial him on some compression stocking. Eye Exam: due  Foot Exam:due  Lab Results   Component Value Date/Time    LABA1C 9.8 06/20/2022 08:13 AM    LABA1C 8.6 08/16/2021 11:32 AM      HYPERLIPIDEMIA  Nathan Lyman is currently on atorvastatin (Lipitor). Triglycerides still elevated we will continue to evaluate patient denies adverse reaction to this medication. Compliance with treatment thus far has been good. The patient is not known to have coexisting coronary artery disease. The 10-year CVD risk score (D'Agostino, et al., 2008) is: 32.1%    Values used to calculate the score:      Age: 55 years      Sex: Male      Diabetic: Yes      Tobacco smoker: Yes      Systolic Blood Pressure: 052 mmHg      Is BP treated: Yes      HDL Cholesterol: 34 mg/dL      Total Cholesterol: 115 mg/dL  Lab Results   Component Value Date/Time    CHOLFAST 115 07/31/2021 10:37 AM    CHOL 181 05/15/2017 09:30 AM    HDL 34 (L) 07/31/2021 10:37 AM    LDLCHOLESTEROL 37 07/31/2021 10:37 AM    TRIGLYCFAST 222 (H) 07/31/2021 10:37 AM    CHOLHDLRATIO 3.4 07/31/2021 10:37 AM    TRIG 186 (H) 05/15/2017 09:30 AM    VLDL NOT REPORTED (H) 07/31/2021 10:37 AM     SLEEP APNEA   Patient uses CPAP for MIGUEL. Mary Gamble is compliant with CPAP use. he reports success with therapy. he denies morning fatigue. Patient states that he is not able to sleep without his CPAP and have been very adherent. No change    CURRENT SMOKER-- cutting down. Long time history of smoking. Mary Gamble is trying to cut down on smoking.   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 Patient is willing to try it Wellbutrin we will be discussing this further after he gets better A1c readings several unsuccessful attempts to quit in past.Nathan  does not have any plans to quit. We will continue to discuss different ways to quit. ARTHRITIS  Patient also complains of occasional joint pain especially in his back. We did not discuss this much today. He does have a known history of arthritis. Patient was taking some occasional NSAIDs. Patient is encouraged to not do the NSAIDs due to the chronic kidney disease will be given some Tylenol. We will continue to discuss other pain control options. OBESITY  Patient's BMI is Body mass index is 44.11 kg/m². kg/m2. BMI is decreasing. Used to be 400 lbs. had lost some weight Patient understands that this condition increases the patient's risk for chronic conditions. Patient advised to practice healthy eating habits. Diet should include plenty of fruits, vegetables, whole grains, lean protein, and low-fat dairy. Increase water consumption. Reduce consumption of dietary sugar and sugar-sweetened beverages. Increasing physical exercises at least 3-4 times a week. We will monitor progression of condition. Wt Readings from Last 3 Encounters:   06/20/22 (!) 325 lb 3.2 oz (147.5 kg)   08/16/21 (!) 330 lb (149.7 kg)   06/11/21 (!) 340 lb (154.2 kg)     VITAMIN D  Patient has a known Vitamin D Deficiency. he had a course of supplementation for 12 weeks. he is due to get levels check. We continue to discuss ways to manage this condition. We also discussed ways to improve the levels. Such as learning food groups that are high in Vitamin D. We also discuss that sun exposure is beneficial however, too much sun and sun damage can potentially result in skin cancer.    Lab Results   Component Value Date/Time    VITD25 20.3 (L) 07/31/2021 10:37 AM       Nathan Vince Alfred is due for annual preventative health screening including annual blood work. We will place the orders for these today. Gay Cuevas is due for PSA screening  The natural history of prostate cancer and ongoing controversy regarding screening and potential treatment outcomes of prostate cancer has been discussed with the patient. The meaning of a false positive PSA and a false negative PSA has been discussed. He indicates understanding of the limitations of this screening test and wishes to proceed with screening PSA testing. He denies frequency, urgency or dysuria, or incomplete bladder emptying. Lab Results   Component Value Date    PSA 2.56 07/31/2021   . Vitals:    06/20/22 0759   BP: 124/72   Pulse: 86   Temp: 97.2 °F (36.2 °C)   SpO2: 97%   Weight: (!) 325 lb 3.2 oz (147.5 kg)   Height: 6' (1.829 m)   Estimated body mass index is 44.11 kg/m² as calculated from the following:    Height as of this encounter: 6' (1.829 m). Weight as of this encounter: 325 lb 3.2 oz (147.5 kg). Review of Systems   Constitutional: Positive for activity change and unexpected weight change (losing weight). Negative for chills and fever. HENT: Positive for ear pain (tinnitus). Respiratory: Positive for apnea (sleep). Negative for shortness of breath and wheezing. Cardiovascular: Negative. Negative for chest pain and palpitations. Gastrointestinal: Negative for abdominal pain. Endocrine: Negative. Genitourinary: Negative for dysuria. Musculoskeletal: Positive for arthralgias, back pain and joint swelling. Negative for myalgias. Skin: Negative for rash. Allergic/Immunologic: Positive for environmental allergies. Neurological: Negative for headaches. Psychiatric/Behavioral: Positive for sleep disturbance (sleep apnea). Negative for dysphoric mood and suicidal ideas. The patient is nervous/anxious. Physical Exam  Vitals and nursing note reviewed. Constitutional:       Appearance: He is well-developed. He is morbidly obese. HENT:      Head: Normocephalic. Right Ear: External ear normal.      Left Ear: External ear normal.      Ears:      Comments: Erythematous. Nose: Nose normal.      Mouth/Throat:      Mouth: Mucous membranes are moist.   Eyes:      Pupils: Pupils are equal, round, and reactive to light. Cardiovascular:      Rate and Rhythm: Normal rate and regular rhythm. Pulses: Normal pulses. Dorsalis pedis pulses are 2+ on the right side and 2+ on the left side. Posterior tibial pulses are 2+ on the right side and 2+ on the left side. Heart sounds: Normal heart sounds. Comments: Hyperpigmented areas on medial side of both ankles. Pulmonary:      Effort: Pulmonary effort is normal. No respiratory distress. Breath sounds: Normal breath sounds. Abdominal:      General: Abdomen is protuberant. Bowel sounds are normal. There is no distension. Palpations: Abdomen is soft. Comments: Morbidly obese   Musculoskeletal:         General: Normal range of motion. Cervical back: Normal range of motion and neck supple. Skin:     General: Skin is warm and dry. Capillary Refill: Capillary refill takes less than 2 seconds. Neurological:      Mental Status: He is alert and oriented to person, place, and time. Psychiatric:         Mood and Affect: Mood is anxious. Speech: Speech is rapid and pressured. Behavior: Behavior normal.         Thought Content: Thought content does not include homicidal or suicidal ideation. Diagnosis Date    Anesthesia     NEVER HAD, FEARFUL HE'LL WAKE UP DISORIENTED & HURT SOMEONE.  Diabetes mellitus ([facility] [address]     Hypertension     Sleep apnea     CPAP MACHINE AT NIGHT.  Umbilical hernia     Wears dentures     UPPER       Prior to Visit Medications    Medication Sig Taking?  Authorizing Provider   Semaglutide (RYBELSUS) 3 MG TABS TAKE 1 TABLET BY MOUTH ONCE DAILY ON  AN  EMPTY  STOMACH  WITH  4  OZ  OF WATER.  WAIT  30  MINUTES  BEFORE  TAKING  ANYTHING  ELSE Yes ELDA Ruiz CNP   empagliflozin (JARDIANCE) 10 MG tablet Take 1 tablet by mouth daily Yes ELDA Ruiz CNP   Semaglutide (RYBELSUS) 7 MG TABS Take 7 mg by mouth daily Yes ELDA Ruiz CNP   Mometasone Furo-Formoterol Fum 50-5 MCG/ACT AERO Inhale 2 puffs into the lungs 2 times daily Yes ELDA Ruiz CNP   albuterol sulfate HFA (PROVENTIL;VENTOLIN;PROAIR) 108 (90 Base) MCG/ACT inhaler Inhale 2 puffs into the lungs every 6 hours as needed for Wheezing Yes ELDA Ruiz CNP   metFORMIN (GLUCOPHAGE) 1000 MG tablet TAKE 1 TABLET BY MOUTH TWICE DAILY WITH MEALS Yes ELDA Ruiz CNP   lisinopril (PRINIVIL;ZESTRIL) 5 MG tablet Take 1 tablet by mouth once daily Yes ELDA Ruiz CNP   hydroCHLOROthiazide (HYDRODIURIL) 25 MG tablet Take 1 tablet by mouth daily Yes ELDA Ruiz CNP   atorvastatin (LIPITOR) 20 MG tablet Take 1 tablet by mouth once daily Yes ELDA Ruiz CNP   acetaminophen (TYLENOL) 500 MG tablet Take 1 tablet by mouth every 6 hours as needed for Pain (Take 1-2 tablets every 6 hours) Yes ELDA Ruiz CNP   aspirin 81 MG chewable tablet Take 1 tablet by mouth daily Yes ELDA Ruiz CNP   Multiple Vitamins-Minerals (THERAPEUTIC MULTIVITAMIN-MINERALS) tablet Take 1 tablet by mouth daily Yes Historical Provider, MD   b complex vitamins capsule Take 1 capsule by mouth daily Yes Historical Provider, MD   glimepiride (AMARYL) 4 MG tablet Take 1 tablet by mouth 2 times daily  ELDA Ruiz CNP   lisinopril (PRINIVIL;ZESTRIL) 5 MG tablet Take 1 tablet by mouth daily  ELDA Ruiz CNP       ASSESSMENT/PLAN:  1. Tinnitus aurium, left  Worsening  DISCUSSED AND ADVISED TO:  Rest.  Advised to increase fluid intake. Eat more fruits and vegetables. Take medications as discussed.   Report for worsening symptoms. - prednisoLONE acetate (PRED FORTE) 1 % ophthalmic suspension; Left ears  Dispense: 1 each; Refill: 0    2. Essential hypertension  Stable  Continue current therapy. DISCUSSED AND ADVISED TO:  Cut down on your salt intake. Cut down on caffeinated drinks, sports drinks. Instructed to check BP at home regularly. Report for any chest pains, shortness of breath, headaches, and lightheadedness. Call the office if your blood pressure continue to be higher than 140/90 or 90/50.      - CBC with Auto Differential; Future  - Comprehensive Metabolic Panel, Fasting; Future  - Urinalysis with Reflex to Culture; Future    3. Type 2 diabetes mellitus with diabetic polyneuropathy, without long-term current use of insulin (HCC)  Failure to Improve  Start jardiance  Start rebelsus  Stop glimeperide  Continue therapy. We will continue to monitor Hemoglobin A1c   DISCUSSED and ADVISED TO:  Continue to check Glucose levels at home. Report increased and low levels as discussed. Decrease carbohydrates, sugary drinks, desserts in your diet. Exercise regularly, as tolerated. Try to lose weight.      - CBC with Auto Differential; Future  - Comprehensive Metabolic Panel, Fasting; Future  - POCT glycosylated hemoglobin (Hb A1C); Future  - Urinalysis with Reflex to Culture; Future  - POCT glycosylated hemoglobin (Hb A1C)  - Semaglutide (RYBELSUS) 3 MG TABS; TAKE 1 TABLET BY MOUTH ONCE DAILY ON  AN  EMPTY  STOMACH  WITH  4  OZ  OF  WATER. WAIT  30  MINUTES  BEFORE  TAKING  ANYTHING  ELSE  Dispense: 30 tablet; Refill: 0  - empagliflozin (JARDIANCE) 10 MG tablet; Take 1 tablet by mouth daily  Dispense: 90 tablet; Refill: 1  - Semaglutide (RYBELSUS) 7 MG TABS; Take 7 mg by mouth daily  Dispense: 30 tablet; Refill: 0    4. Mixed hyperlipidemia  Stable  Continue therapy. Advised to decrease the consumption of red meats, fried foods, trans fats, sweets, sugary beverages.    Advised to increase fish, vegetables, and fruits consumption. Advised to add fiber or OTC supplements in diet. Discussed weight loss which will result in improvement of lipids levels. Advised to increase daily physical activities and add regular exercises. - Comprehensive Metabolic Panel, Fasting; Future  - Lipid, Fasting; Future    5. Stage 3a chronic kidney disease (HCC)  Failure to Improve  Continue renal consult  Advised to increase fluid intake  Report decrease urine output  Decrease salt intake    - CBC with Auto Differential; Future  - Comprehensive Metabolic Panel, Fasting; Future  - Urinalysis with Reflex to Culture; Future    6. Moderate asthma without complication, unspecified whether persistent  Failure to Improve  Continue current therapy. DISCUSSED and ADVISED TO:  Use prescribed inhalers or medications regularly. Avoid known irritants and exposure to known triggers. Advised to report the need to use your albuterol inhaler more than usual  Stay away from smoking or second hand smoke. Go to the nearest ER for increasing SOB. - Mometasone Furo-Formoterol Fum 50-5 MCG/ACT AERO; Inhale 2 puffs into the lungs 2 times daily  Dispense: 1 each; Refill: 2  - albuterol sulfate HFA (PROVENTIL;VENTOLIN;PROAIR) 108 (90 Base) MCG/ACT inhaler; Inhale 2 puffs into the lungs every 6 hours as needed for Wheezing  Dispense: 9 each; Refill: 0    7. PVD (peripheral vascular disease) (HCC)  Stable  Take the medication as it is discussed. DISCUSSED AND ADVISED TO:  Monitor your blood pressure regularly. Go to the emergency room for sustained headache, chest pain, shortness of breath, or sustained erection. 8. MIGUEL treated with BiPAP  Stable  DISCUSSED AND ADVISED TO:  Weight loss with diet exercise,   decrease caffeine intake. Especially in the evening. Healthy sleep hygiene measures,  Effective positional therapy,  Avoid sleep deprivation  Continue CPAP use nightly as discussed.         9. Polyneuropathy  Stable  Continue current therapy  Encouraged to maintain glucose levels  Encouraged to wear shoes all the time  Yearly foot exam      10. Morbid obesity with BMI of 40.0-44.9, adult (HCC)  Stable  BMI decreasing  DISCUSSED AND ADVISED TO:  Eat a low-fat and low carbohydrates diet. Avoid fried foods especially fast food. Choose healthier options for snacks. Have 5-6 servings of fruits and vegetables per day. Cut down on eating processed food. Add 30 minutes to 1 hour aerobic exercise for 3-4 days a week. 11. Vitamin D deficiency  Stable  Continue Vitamin D supplementation  DISCUSSED AND ADVISED TO:  Foods that contain a lot of vitamin D includes [place], tuna, and mackerel. Cheese, egg yolks, and beef liver have small amounts of vit D.  Milk, soy drinks, orange juice, yogurt, margarine, and some kinds of cereal have vitamin D added to them. Continue to use sunblock when out in the sun to prevent skin cancer.    - Vitamin D 25 Hydroxy; Future    12. Tobacco use  Stable  Counseling given to quit smoking. Support offered. 13. History of smoking  Stable  Failure to Improve  Continue to evaluate  Recommend surveillance  - CT LUNG SCREENING; Future    14. Screening for prostate cancer  Failure to Improve  Continue to evaluate  Recommend surveillance  - PSA Screening; Future      Return in about 3 months (around 9/20/2022) for Chronic conditions, 30mins, Rev. results. This note was completed by using the assistance of a speech-recognition program. However, inadvertent computerized transcription errors may be present. Although every effort was made to ensure accuracy, no guarantees can be provided that every mistake has been identified and corrected by editing.   Electronically signed by ELDA Holloway CNP on 6/8/21 at 7:20 PM EDT     --ELDA Holloway CNP

## 2022-06-20 ENCOUNTER — OFFICE VISIT (OUTPATIENT)
Dept: FAMILY MEDICINE CLINIC | Age: 57
End: 2022-06-20
Payer: COMMERCIAL

## 2022-06-20 VITALS
HEIGHT: 72 IN | DIASTOLIC BLOOD PRESSURE: 72 MMHG | OXYGEN SATURATION: 97 % | SYSTOLIC BLOOD PRESSURE: 124 MMHG | HEART RATE: 86 BPM | TEMPERATURE: 97.2 F | WEIGHT: 315 LBS | BODY MASS INDEX: 42.66 KG/M2

## 2022-06-20 DIAGNOSIS — N18.31 STAGE 3A CHRONIC KIDNEY DISEASE (HCC): ICD-10-CM

## 2022-06-20 DIAGNOSIS — J45.909 MODERATE ASTHMA WITHOUT COMPLICATION, UNSPECIFIED WHETHER PERSISTENT: ICD-10-CM

## 2022-06-20 DIAGNOSIS — Z12.5 SCREENING FOR PROSTATE CANCER: ICD-10-CM

## 2022-06-20 DIAGNOSIS — E55.9 VITAMIN D DEFICIENCY: ICD-10-CM

## 2022-06-20 DIAGNOSIS — I73.9 PVD (PERIPHERAL VASCULAR DISEASE) (HCC): ICD-10-CM

## 2022-06-20 DIAGNOSIS — E11.42 TYPE 2 DIABETES MELLITUS WITH DIABETIC POLYNEUROPATHY, WITHOUT LONG-TERM CURRENT USE OF INSULIN (HCC): ICD-10-CM

## 2022-06-20 DIAGNOSIS — E66.01 MORBID OBESITY WITH BMI OF 40.0-44.9, ADULT (HCC): ICD-10-CM

## 2022-06-20 DIAGNOSIS — G47.33 OSA TREATED WITH BIPAP: ICD-10-CM

## 2022-06-20 DIAGNOSIS — G62.9 POLYNEUROPATHY: ICD-10-CM

## 2022-06-20 DIAGNOSIS — Z72.0 TOBACCO USE: ICD-10-CM

## 2022-06-20 DIAGNOSIS — Z87.891 HISTORY OF SMOKING: ICD-10-CM

## 2022-06-20 DIAGNOSIS — H93.12 TINNITUS AURIUM, LEFT: Primary | ICD-10-CM

## 2022-06-20 DIAGNOSIS — I10 ESSENTIAL HYPERTENSION: ICD-10-CM

## 2022-06-20 DIAGNOSIS — E78.2 MIXED HYPERLIPIDEMIA: ICD-10-CM

## 2022-06-20 LAB — HBA1C MFR BLD: 9.8 %

## 2022-06-20 PROCEDURE — 99214 OFFICE O/P EST MOD 30 MIN: CPT | Performed by: FAMILY MEDICINE

## 2022-06-20 PROCEDURE — 3046F HEMOGLOBIN A1C LEVEL >9.0%: CPT | Performed by: FAMILY MEDICINE

## 2022-06-20 PROCEDURE — 81003 URINALYSIS AUTO W/O SCOPE: CPT | Performed by: FAMILY MEDICINE

## 2022-06-20 PROCEDURE — 83036 HEMOGLOBIN GLYCOSYLATED A1C: CPT | Performed by: FAMILY MEDICINE

## 2022-06-20 RX ORDER — ORAL SEMAGLUTIDE 7 MG/1
7 TABLET ORAL DAILY
Qty: 30 TABLET | Refills: 0 | Status: SHIPPED | OUTPATIENT
Start: 2022-06-20

## 2022-06-20 RX ORDER — PREDNISOLONE ACETATE 10 MG/ML
SUSPENSION/ DROPS OPHTHALMIC
Qty: 1 EACH | Refills: 0 | Status: SHIPPED | OUTPATIENT
Start: 2022-06-20

## 2022-06-20 RX ORDER — ALBUTEROL SULFATE 90 UG/1
2 AEROSOL, METERED RESPIRATORY (INHALATION) EVERY 6 HOURS PRN
Qty: 9 EACH | Refills: 0 | Status: SHIPPED | OUTPATIENT
Start: 2022-06-20 | End: 2022-09-18

## 2022-06-20 RX ORDER — ORAL SEMAGLUTIDE 3 MG/1
TABLET ORAL
Qty: 30 TABLET | Refills: 0 | Status: SHIPPED | OUTPATIENT
Start: 2022-06-20

## 2022-06-20 SDOH — ECONOMIC STABILITY: FOOD INSECURITY: WITHIN THE PAST 12 MONTHS, THE FOOD YOU BOUGHT JUST DIDN'T LAST AND YOU DIDN'T HAVE MONEY TO GET MORE.: NEVER TRUE

## 2022-06-20 SDOH — ECONOMIC STABILITY: FOOD INSECURITY: WITHIN THE PAST 12 MONTHS, YOU WORRIED THAT YOUR FOOD WOULD RUN OUT BEFORE YOU GOT MONEY TO BUY MORE.: NEVER TRUE

## 2022-06-20 ASSESSMENT — PATIENT HEALTH QUESTIONNAIRE - PHQ9
SUM OF ALL RESPONSES TO PHQ9 QUESTIONS 1 & 2: 0
SUM OF ALL RESPONSES TO PHQ QUESTIONS 1-9: 0
2. FEELING DOWN, DEPRESSED OR HOPELESS: 0
SUM OF ALL RESPONSES TO PHQ QUESTIONS 1-9: 0
SUM OF ALL RESPONSES TO PHQ QUESTIONS 1-9: 0
1. LITTLE INTEREST OR PLEASURE IN DOING THINGS: 0
SUM OF ALL RESPONSES TO PHQ QUESTIONS 1-9: 0

## 2022-06-20 ASSESSMENT — SOCIAL DETERMINANTS OF HEALTH (SDOH): HOW HARD IS IT FOR YOU TO PAY FOR THE VERY BASICS LIKE FOOD, HOUSING, MEDICAL CARE, AND HEATING?: NOT HARD AT ALL

## 2022-06-20 ASSESSMENT — ENCOUNTER SYMPTOMS: WHEEZING: 0

## 2022-06-20 NOTE — PROGRESS NOTES
Visit Information    Have you changed or started any medications since your last visit including any over-the-counter medicines, vitamins, or herbal medicines? no   Have you stopped taking any of your medications? Is so, why? -  no  Are you having any side effects from any of your medications? - no    Have you seen any other physician or provider since your last visit?  no   Have you had any other diagnostic tests since your last visit?  no   Have you been seen in the emergency room and/or had an admission in a hospital since we last saw you?  no   Have you had your routine dental cleaning in the past 6 months?  no     Do you have an active MyChart account? If no, what is the barrier?   Yes    Patient Care Team:  ELDA Ruiz CNP as PCP - General (Family Medicine)  ELDA Ruiz CNP as PCP - [facility] Provider    Medical History Review  Past Medical, Family, and Social History reviewed and does contribute to the patient presenting condition    Health Maintenance   Topic Date Due    Hepatitis B vaccine (1 of 3 - Risk 3-dose series) Never done    DTaP/Tdap/Td vaccine (1 - Tdap) Never done    Shingles vaccine (1 of 2) Never done    Low dose CT lung screening  Never done    Diabetic retinal exam  05/15/2018    Pneumococcal 0-64 years Vaccine (2 - PCV) 08/07/2018    Diabetic foot exam  03/05/2020    COVID-19 Vaccine (3 - Booster for Cerv[place] Peter series) 02/25/2022    Depression Screen  06/11/2022    Lipids  07/31/2022    Prostate Specific Antigen (PSA) Screening or Monitoring  07/31/2022    A1C test (Diabetic or Prediabetic)  08/16/2022    Flu vaccine (Season Ended) 09/01/2022    Colorectal Cancer Screen  10/06/2024    Hepatitis C screen  Completed    HIV screen  Completed    Hepatitis A vaccine  Aged Out    Hib vaccine  Aged Out    Meningococcal (ACWY) vaccine  Aged Out

## 2022-06-20 NOTE — PATIENT INSTRUCTIONS
New Updates for [facility] MyChart/ [facility] ([facility]) BRANDEN    Thank you for choosing US to give you the best care! [facility] ([facility]) is always trying to think of new ways to help their patients. We are asking all patients to try out the new digital registration that is now available through your [facility] account or the new BRANDEN, [facility] ([facility]). Via the branden you're now able to update your personal and registration information prior to your upcoming appointment. This will save you time once you arrive at the office to check-in, not to mention your information remains safe!! Many other perks come from signing up for an account, such as:   Requesting refills   Scheduling an appointment   Completing an [address] Sending a message to the office/provider   Having access to your medication list   Paying your bill/copay prior to your appointment   Scheduling your yearly mammogram   Review your test results    If you are not familiar with [facility] or the [facility] BRANDEN, please ask one of us and we will be happy to answer any questions or help you set-up your account. Your [facility] office,  [facility]    Patient Education        Low Sodium Diet (2,000 Milligram): Care Instructions  Overview     Limiting sodium can be an important part of managing some health problems. The most common source of sodium is salt. People get most of the salt in their diet from canned, prepared, and packaged foods. Fast food and restaurant meals also are very high in sodium. Your doctor will probably limit your sodium to less than 2,000 milligrams (mg) a day. This limit counts all the sodium inprepared and packaged foods and any salt you add to your food. Follow-up care is a key part of your treatment and safety. Be sure to make and go to all appointments, and call your doctor if you are having problems. It's also a good idea to know your test results and keep alist of the medicines you take.   How can you care for yourself at home? Read food labels   Read labels on cans and food packages. The labels tell you how much sodium is in each serving. Make sure that you look at the serving size. If you eat more than the serving size, you have eaten more sodium.  Food labels also tell you the Percent Daily Value for sodium. Choose products with low Percent Daily Values for sodium.  Be aware that sodium can come in forms other than salt, including monosodium glutamate (MSG), sodium citrate, and sodium bicarbonate (baking soda). MSG is often added to Asian food. When you eat out, you can sometimes ask for food without MSG or added salt. Buy low-sodium foods   Buy foods that are labeled \"unsalted\" (no salt added), \"sodium-free\" (less than 5 mg of sodium per serving), or \"low-sodium\" (140 mg or less of sodium per serving). Foods labeled \"reduced-sodium\" and \"light sodium\" may still have too much sodium. Be sure to read the label to see how much sodium you are getting.  Buy fresh vegetables, or frozen vegetables without added sauces. Buy low-sodium versions of canned vegetables, soups, and other canned goods. Prepare low-sodium meals   Cut back on the amount of salt you use in cooking. This will help you adjust to the taste. Do not add salt after cooking. One teaspoon of salt has about 2,300 mg of sodium.  Take the salt shaker off the table.  Flavor your food with garlic, lemon juice, onion, vinegar, herbs, and spices. Do not use soy sauce, lite soy sauce, steak sauce, onion salt, garlic salt, celery salt, or ketchup on your food.  Use low-sodium salad dressings, sauces, and ketchup. Or make your own salad dressings and sauces without adding salt.  Use less salt (or none) when recipes call for it. You can often use half the salt a recipe calls for without losing flavor. Other foods such as rice, pasta, and grains do not need added salt.  Rinse canned vegetables, and cook them in fresh water.  This removes some--but not all--of the salt.  Avoid water that is naturally high in sodium or that has been treated with water softeners, which add sodium. If you buy bottled water, read the label and choose a sodium-free brand. Avoid high-sodium foods   Avoid eating:  ? Smoked, cured, salted, and canned meat, fish, and poultry. ? Ham, simon, hot dogs, and luncheon meats. ? Regular, hard, and processed cheese and regular peanut butter. ? Crackers with salted tops, and other salted snack foods such as pretzels, chips, and salted popcorn. ? Frozen prepared meals, unless labeled low-sodium. ? Canned and dried soups, broths, and bouillon, unless labeled sodium-free or low-sodium. ? Canned vegetables, unless labeled sodium-free or low-sodium. ? Western Radha fries, pizza, tacos, and other fast foods. ? Pickles, olives, ketchup, and other condiments, especially soy sauce, unless labeled sodium-free or low-sodium. Where can you learn more? Go to https://Senor SirloinpeHealPayeb.Gate 53|[employer]. org and sign in to your [employer] account. Enter T118 in the [facility] box to learn more about \"Low Sodium Diet (2,000 Milligram): Care Instructions. \"     If you do not have an account, please click on the \"Sign Up Now\" link. Current as of: September 8, 2021               Content Version: 13.2  © 2006-2022 [employer]. Care instructions adapted under license by [facility] ([facility]). If you have questions about a medical condition or this instruction, always ask your healthcare professional. [place] [address] any warranty or liability for your use of this information. Patient Education        High Blood Pressure: Care Instructions  Overview     It's normal for blood pressure to go up and down throughout the day. But if it stays up, you have high blood pressure. Another name for high blood pressure ishypertension.   Despite what a lot of people think, high blood pressure usually doesn't cause headaches or make you feel dizzy or lightheaded. It usually has no symptoms. But it does increase your risk of stroke, heart attack, and other problems. You and your doctor will talk about your risks of these problems based on yourblood pressure. Your doctor will give you a goal for your blood pressure. Your goal will bebased on your health and your age. Lifestyle changes, such as eating healthy and being active, are always important to help lower blood pressure. You might also take medicine to reachyour blood pressure goal.  Follow-up care is a key part of your treatment and safety. Be sure to make and go to all appointments, and call your doctor if you are having problems. It's also a good idea to know your test results and keep alist of the medicines you take. How can you care for yourself at home? Medical treatment   If you stop taking your medicine, your blood pressure will go back up. You may take one or more types of medicine to lower your blood pressure. Be safe with medicines. Take your medicine exactly as prescribed. Call your doctor if you think you are having a problem with your medicine.  Talk to your doctor before you start taking aspirin every day. Aspirin can help certain people lower their risk of a heart attack or stroke. But taking aspirin isn't right for everyone, because it can cause serious bleeding.  See your doctor regularly. You may need to see the doctor more often at first or until your blood pressure comes down.  If you are taking blood pressure medicine, talk to your doctor before you take decongestants or anti-inflammatory medicine, such as ibuprofen. Some of these medicines can raise blood pressure.  Learn how to check your blood pressure at home. Lifestyle changes   Stay at a healthy weight. This is especially important if you put on weight around the waist. Losing even 10 pounds can help you lower your blood pressure.  If your doctor recommends it, get more exercise.  Walking is a good choice. Bit by bit, increase the amount you walk every day. Try for at least 30 minutes on most days of the week. You also may want to swim, bike, or do other activities.  Avoid or limit alcohol. Talk to your doctor about whether you can drink any alcohol.  Try to limit how much sodium you eat to less than 2,300 milligrams (mg) a day. Your doctor may ask you to try to eat less than 1,500 mg a day.  Eat plenty of fruits (such as bananas and oranges), vegetables, legumes, whole grains, and low-fat dairy products.  Lower the amount of saturated fat in your diet. Saturated fat is found in animal products such as milk, cheese, and meat. Limiting these foods may help you lose weight and also lower your risk for heart disease.  Do not smoke. Smoking increases your risk for heart attack and stroke. If you need help quitting, talk to your doctor about stop-smoking programs and medicines. These can increase your chances of quitting for good. When should you call for help? Call 911  anytime you think you may need emergency care. This may mean having symptoms that suggest that your blood pressure is causing a serious heart or blood vessel problem. Your blood pressure may be over 180/120. For example, call 911 if:     You have symptoms of a heart attack. These may include:  ? Chest pain or pressure, or a strange feeling in the chest.  ? Sweating. ? Shortness of breath. ? Nausea or vomiting. ? Pain, pressure, or a strange feeling in the back, neck, jaw, or upper belly or in one or both shoulders or arms. ? Lightheadedness or sudden weakness. ? A fast or irregular heartbeat.      You have symptoms of a stroke. These may include:  ? Sudden numbness, tingling, weakness, or loss of movement in your face, arm, or leg, especially on only one side of your body. ? Sudden vision changes. ? Sudden trouble speaking. ? Sudden confusion or trouble understanding simple statements.   ? Sudden problems with walking or balance. ? A sudden, severe headache that is different from past headaches.      You have severe back or belly pain. Do not wait until your blood pressure comes down on its own. Get help right away. Call your doctor now or seek immediate care if:     Your blood pressure is much higher than normal (such as 180/120 or higher), but you don't have symptoms.      You think high blood pressure is causing symptoms, such as:  ? Severe headache.  ? Blurry vision. Watch closely for changes in your health, and be sure to contact your doctor if:     Your blood pressure measures higher than your doctor recommends at least 2 times. That means the top number is higher or the bottom number is higher, or both.      You think you may be having side effects from your blood pressure medicine. Where can you learn more? Go to https://WeMontage[employer]omic Expression.[employer]. org and sign in to your [employer] account. Enter J727 in the [employer] box to learn more about \"High Blood Pressure: Care Instructions. \"     If you do not have an account, please click on the \"Sign Up Now\" link. Current as of: January 10, 2022               Content Version: 13.2  © 9382-6006 Healthwise, Incorporated. Care instructions adapted under license by [facility] ([facility]). If you have questions about a medical condition or this instruction, always ask your healthcare professional. [address] any warranty or liability for your use of this information.

## 2022-07-07 DIAGNOSIS — E11.9 TYPE 2 DIABETES MELLITUS WITHOUT COMPLICATION, WITHOUT LONG-TERM CURRENT USE OF INSULIN (HCC): ICD-10-CM

## 2022-07-07 DIAGNOSIS — I10 ESSENTIAL HYPERTENSION: ICD-10-CM

## 2022-07-08 RX ORDER — LISINOPRIL 5 MG/1
TABLET ORAL
Qty: 90 TABLET | Refills: 2 | Status: SHIPPED | OUTPATIENT
Start: 2022-07-08

## 2022-07-19 DIAGNOSIS — J45.909 MODERATE ASTHMA WITHOUT COMPLICATION, UNSPECIFIED WHETHER PERSISTENT: Primary | ICD-10-CM

## 2022-11-16 ENCOUNTER — ANESTHESIA (OUTPATIENT)
Dept: OPERATING ROOM | Age: 57
DRG: 717 | End: 2022-11-16
Payer: COMMERCIAL

## 2022-11-16 ENCOUNTER — HOSPITAL ENCOUNTER (INPATIENT)
Age: 57
LOS: 2 days | Discharge: HOME | DRG: 717 | End: 2022-11-18
Attending: EMERGENCY MEDICINE | Admitting: INTERNAL MEDICINE
Payer: COMMERCIAL

## 2022-11-16 ENCOUNTER — APPOINTMENT (OUTPATIENT)
Dept: CT IMAGING | Age: 57
DRG: 717 | End: 2022-11-16
Payer: COMMERCIAL

## 2022-11-16 ENCOUNTER — ANESTHESIA EVENT (OUTPATIENT)
Dept: OPERATING ROOM | Age: 57
DRG: 717 | End: 2022-11-16
Payer: COMMERCIAL

## 2022-11-16 DIAGNOSIS — N49.2 ABSCESS OF SCROTUM: ICD-10-CM

## 2022-11-16 DIAGNOSIS — N49.2 CELLULITIS OF SCROTUM: Primary | ICD-10-CM

## 2022-11-16 PROBLEM — N45.4 TESTICULAR ABSCESS: Status: ACTIVE | Noted: 2022-11-16

## 2022-11-16 LAB
ABSOLUTE EOS #: 0.3 K/UL (ref 0–0.4)
ABSOLUTE LYMPH #: 1.9 K/UL (ref 1–4.8)
ABSOLUTE MONO #: 0.9 K/UL (ref 0.1–1.3)
ANION GAP SERPL CALCULATED.3IONS-SCNC: 12 MMOL/L (ref 9–17)
BASOPHILS # BLD: 1 % (ref 0–2)
BASOPHILS ABSOLUTE: 0.1 K/UL (ref 0–0.2)
BUN BLDV-MCNC: 17 MG/DL (ref 6–20)
C-REACTIVE PROTEIN: 37.8 MG/L (ref 0–5)
CALCIUM SERPL-MCNC: 9.3 MG/DL (ref 8.6–10.4)
CHLORIDE BLD-SCNC: 95 MMOL/L (ref 98–107)
CO2: 23 MMOL/L (ref 20–31)
CREAT SERPL-MCNC: 1.11 MG/DL (ref 0.7–1.2)
EOSINOPHILS RELATIVE PERCENT: 2 % (ref 0–4)
GFR SERPL CREATININE-BSD FRML MDRD: >60 ML/MIN/1.73M2
GLUCOSE BLD-MCNC: 197 MG/DL (ref 75–110)
GLUCOSE BLD-MCNC: 250 MG/DL (ref 75–110)
GLUCOSE BLD-MCNC: 277 MG/DL (ref 70–99)
HCT VFR BLD CALC: 42.8 % (ref 41–53)
HEMOGLOBIN: 14.6 G/DL (ref 13.5–17.5)
LACTIC ACID: 1.5 MMOL/L (ref 0.5–2.2)
LYMPHOCYTES # BLD: 14 % (ref 24–44)
MCH RBC QN AUTO: 28.5 PG (ref 26–34)
MCHC RBC AUTO-ENTMCNC: 34.2 G/DL (ref 31–37)
MCV RBC AUTO: 83.3 FL (ref 80–100)
MONOCYTES # BLD: 6 % (ref 1–7)
PDW BLD-RTO: 14.4 % (ref 11.5–14.9)
PLATELET # BLD: 273 K/UL (ref 150–450)
PMV BLD AUTO: 7.7 FL (ref 6–12)
POTASSIUM SERPL-SCNC: 4.8 MMOL/L (ref 3.7–5.3)
PROCALCITONIN: 0.05 NG/ML
RBC # BLD: 5.14 M/UL (ref 4.5–5.9)
SEG NEUTROPHILS: 77 % (ref 36–66)
SEGMENTED NEUTROPHILS ABSOLUTE COUNT: 10.7 K/UL (ref 1.3–9.1)
SODIUM BLD-SCNC: 130 MMOL/L (ref 135–144)
WBC # BLD: 13.8 K/UL (ref 3.5–11)

## 2022-11-16 PROCEDURE — 2500000003 HC RX 250 WO HCPCS

## 2022-11-16 PROCEDURE — 74177 CT ABD & PELVIS W/CONTRAST: CPT

## 2022-11-16 PROCEDURE — 2500000003 HC RX 250 WO HCPCS: Performed by: PHYSICIAN ASSISTANT

## 2022-11-16 PROCEDURE — 3600000012 HC SURGERY LEVEL 2 ADDTL 15MIN: Performed by: UROLOGY

## 2022-11-16 PROCEDURE — 80048 BASIC METABOLIC PNL TOTAL CA: CPT

## 2022-11-16 PROCEDURE — 96374 THER/PROPH/DIAG INJ IV PUSH: CPT

## 2022-11-16 PROCEDURE — 2060000000 HC ICU INTERMEDIATE R&B

## 2022-11-16 PROCEDURE — 87205 SMEAR GRAM STAIN: CPT

## 2022-11-16 PROCEDURE — 84145 PROCALCITONIN (PCT): CPT

## 2022-11-16 PROCEDURE — 96375 TX/PRO/DX INJ NEW DRUG ADDON: CPT

## 2022-11-16 PROCEDURE — 6370000000 HC RX 637 (ALT 250 FOR IP): Performed by: UROLOGY

## 2022-11-16 PROCEDURE — 6360000004 HC RX CONTRAST MEDICATION: Performed by: PHYSICIAN ASSISTANT

## 2022-11-16 PROCEDURE — 0HBAXZZ EXCISION OF INGUINAL SKIN, EXTERNAL APPROACH: ICD-10-PCS | Performed by: UROLOGY

## 2022-11-16 PROCEDURE — 99285 EMERGENCY DEPT VISIT HI MDM: CPT

## 2022-11-16 PROCEDURE — 2500000003 HC RX 250 WO HCPCS: Performed by: ANESTHESIOLOGY

## 2022-11-16 PROCEDURE — 83605 ASSAY OF LACTIC ACID: CPT

## 2022-11-16 PROCEDURE — 3700000001 HC ADD 15 MINUTES (ANESTHESIA): Performed by: UROLOGY

## 2022-11-16 PROCEDURE — 3600000002 HC SURGERY LEVEL 2 BASE: Performed by: UROLOGY

## 2022-11-16 PROCEDURE — 2709999900 HC NON-CHARGEABLE SUPPLY: Performed by: UROLOGY

## 2022-11-16 PROCEDURE — 0V950ZZ DRAINAGE OF SCROTUM, OPEN APPROACH: ICD-10-PCS | Performed by: UROLOGY

## 2022-11-16 PROCEDURE — 85025 COMPLETE CBC W/AUTO DIFF WBC: CPT

## 2022-11-16 PROCEDURE — 86403 PARTICLE AGGLUT ANTBDY SCRN: CPT

## 2022-11-16 PROCEDURE — 6360000002 HC RX W HCPCS: Performed by: PHYSICIAN ASSISTANT

## 2022-11-16 PROCEDURE — 2580000003 HC RX 258: Performed by: PHYSICIAN ASSISTANT

## 2022-11-16 PROCEDURE — 36415 COLL VENOUS BLD VENIPUNCTURE: CPT

## 2022-11-16 PROCEDURE — 2580000003 HC RX 258: Performed by: ANESTHESIOLOGY

## 2022-11-16 PROCEDURE — 99223 1ST HOSP IP/OBS HIGH 75: CPT | Performed by: INTERNAL MEDICINE

## 2022-11-16 PROCEDURE — 6360000002 HC RX W HCPCS: Performed by: ANESTHESIOLOGY

## 2022-11-16 PROCEDURE — 7100000000 HC PACU RECOVERY - FIRST 15 MIN: Performed by: UROLOGY

## 2022-11-16 PROCEDURE — 86140 C-REACTIVE PROTEIN: CPT

## 2022-11-16 PROCEDURE — 7100000001 HC PACU RECOVERY - ADDTL 15 MIN: Performed by: UROLOGY

## 2022-11-16 PROCEDURE — 87075 CULTR BACTERIA EXCEPT BLOOD: CPT

## 2022-11-16 PROCEDURE — 82947 ASSAY GLUCOSE BLOOD QUANT: CPT

## 2022-11-16 PROCEDURE — 3700000000 HC ANESTHESIA ATTENDED CARE: Performed by: UROLOGY

## 2022-11-16 PROCEDURE — 87040 BLOOD CULTURE FOR BACTERIA: CPT

## 2022-11-16 PROCEDURE — 6370000000 HC RX 637 (ALT 250 FOR IP)

## 2022-11-16 PROCEDURE — 87070 CULTURE OTHR SPECIMN AEROBIC: CPT

## 2022-11-16 RX ORDER — SODIUM CHLORIDE 0.9 % (FLUSH) 0.9 %
5-40 SYRINGE (ML) INJECTION EVERY 12 HOURS SCHEDULED
Status: DISCONTINUED | OUTPATIENT
Start: 2022-11-16 | End: 2022-11-18 | Stop reason: HOSPADM

## 2022-11-16 RX ORDER — METRONIDAZOLE 500 MG/100ML
500 INJECTION, SOLUTION INTRAVENOUS ONCE
Status: COMPLETED | OUTPATIENT
Start: 2022-11-16 | End: 2022-11-16

## 2022-11-16 RX ORDER — SODIUM CHLORIDE 0.9 % (FLUSH) 0.9 %
5-40 SYRINGE (ML) INJECTION PRN
Status: DISCONTINUED | OUTPATIENT
Start: 2022-11-16 | End: 2022-11-18 | Stop reason: HOSPADM

## 2022-11-16 RX ORDER — ATORVASTATIN CALCIUM 20 MG/1
20 TABLET, FILM COATED ORAL DAILY
Status: DISCONTINUED | OUTPATIENT
Start: 2022-11-16 | End: 2022-11-18 | Stop reason: HOSPADM

## 2022-11-16 RX ORDER — MORPHINE SULFATE 4 MG/ML
4 INJECTION, SOLUTION INTRAMUSCULAR; INTRAVENOUS EVERY 6 HOURS PRN
Status: DISCONTINUED | OUTPATIENT
Start: 2022-11-16 | End: 2022-11-18

## 2022-11-16 RX ORDER — MIDAZOLAM HYDROCHLORIDE 1 MG/ML
INJECTION INTRAMUSCULAR; INTRAVENOUS PRN
Status: DISCONTINUED | OUTPATIENT
Start: 2022-11-16 | End: 2022-11-16 | Stop reason: SDUPTHER

## 2022-11-16 RX ORDER — ONDANSETRON 2 MG/ML
4 INJECTION INTRAMUSCULAR; INTRAVENOUS EVERY 6 HOURS PRN
Status: DISCONTINUED | OUTPATIENT
Start: 2022-11-16 | End: 2022-11-18 | Stop reason: HOSPADM

## 2022-11-16 RX ORDER — SODIUM CHLORIDE 9 MG/ML
INJECTION, SOLUTION INTRAVENOUS PRN
Status: DISCONTINUED | OUTPATIENT
Start: 2022-11-16 | End: 2022-11-18 | Stop reason: HOSPADM

## 2022-11-16 RX ORDER — ACETAMINOPHEN 650 MG/1
650 SUPPOSITORY RECTAL EVERY 6 HOURS PRN
Status: DISCONTINUED | OUTPATIENT
Start: 2022-11-16 | End: 2022-11-18 | Stop reason: HOSPADM

## 2022-11-16 RX ORDER — MORPHINE SULFATE 4 MG/ML
4 INJECTION, SOLUTION INTRAMUSCULAR; INTRAVENOUS ONCE
Status: COMPLETED | OUTPATIENT
Start: 2022-11-16 | End: 2022-11-16

## 2022-11-16 RX ORDER — POLYETHYLENE GLYCOL 3350 17 G/17G
17 POWDER, FOR SOLUTION ORAL DAILY PRN
Status: DISCONTINUED | OUTPATIENT
Start: 2022-11-16 | End: 2022-11-18 | Stop reason: HOSPADM

## 2022-11-16 RX ORDER — PROPOFOL 10 MG/ML
INJECTION, EMULSION INTRAVENOUS PRN
Status: DISCONTINUED | OUTPATIENT
Start: 2022-11-16 | End: 2022-11-16 | Stop reason: SDUPTHER

## 2022-11-16 RX ORDER — LISINOPRIL 5 MG/1
5 TABLET ORAL DAILY
Status: DISCONTINUED | OUTPATIENT
Start: 2022-11-16 | End: 2022-11-18 | Stop reason: HOSPADM

## 2022-11-16 RX ORDER — SUCCINYLCHOLINE/SOD CL,ISO/PF 200MG/10ML
SYRINGE (ML) INTRAVENOUS PRN
Status: DISCONTINUED | OUTPATIENT
Start: 2022-11-16 | End: 2022-11-16 | Stop reason: SDUPTHER

## 2022-11-16 RX ORDER — ACETAMINOPHEN 325 MG/1
650 TABLET ORAL EVERY 6 HOURS PRN
Status: DISCONTINUED | OUTPATIENT
Start: 2022-11-16 | End: 2022-11-18 | Stop reason: HOSPADM

## 2022-11-16 RX ORDER — 0.9 % SODIUM CHLORIDE 0.9 %
80 INTRAVENOUS SOLUTION INTRAVENOUS ONCE
Status: COMPLETED | OUTPATIENT
Start: 2022-11-16 | End: 2022-11-16

## 2022-11-16 RX ORDER — HYDROCHLOROTHIAZIDE 25 MG/1
25 TABLET ORAL DAILY
Status: DISCONTINUED | OUTPATIENT
Start: 2022-11-16 | End: 2022-11-18 | Stop reason: HOSPADM

## 2022-11-16 RX ORDER — SODIUM CHLORIDE 9 MG/ML
INJECTION, SOLUTION INTRAVENOUS CONTINUOUS PRN
Status: DISCONTINUED | OUTPATIENT
Start: 2022-11-16 | End: 2022-11-16 | Stop reason: SDUPTHER

## 2022-11-16 RX ORDER — ONDANSETRON 4 MG/1
4 TABLET, ORALLY DISINTEGRATING ORAL EVERY 8 HOURS PRN
Status: DISCONTINUED | OUTPATIENT
Start: 2022-11-16 | End: 2022-11-18 | Stop reason: HOSPADM

## 2022-11-16 RX ORDER — METOCLOPRAMIDE HYDROCHLORIDE 5 MG/ML
10 INJECTION INTRAMUSCULAR; INTRAVENOUS
Status: ACTIVE | OUTPATIENT
Start: 2022-11-16 | End: 2022-11-17

## 2022-11-16 RX ORDER — ONDANSETRON 2 MG/ML
4 INJECTION INTRAMUSCULAR; INTRAVENOUS
Status: ACTIVE | OUTPATIENT
Start: 2022-11-16 | End: 2022-11-17

## 2022-11-16 RX ORDER — DEXTROSE MONOHYDRATE 100 MG/ML
INJECTION, SOLUTION INTRAVENOUS CONTINUOUS PRN
Status: DISCONTINUED | OUTPATIENT
Start: 2022-11-16 | End: 2022-11-18 | Stop reason: HOSPADM

## 2022-11-16 RX ORDER — CEFAZOLIN SODIUM 1 G/3ML
INJECTION, POWDER, FOR SOLUTION INTRAMUSCULAR; INTRAVENOUS PRN
Status: DISCONTINUED | OUTPATIENT
Start: 2022-11-16 | End: 2022-11-16 | Stop reason: SDUPTHER

## 2022-11-16 RX ORDER — ASPIRIN 81 MG/1
81 TABLET, CHEWABLE ORAL DAILY
Status: DISCONTINUED | OUTPATIENT
Start: 2022-11-16 | End: 2022-11-18 | Stop reason: HOSPADM

## 2022-11-16 RX ORDER — ROCURONIUM BROMIDE 10 MG/ML
INJECTION, SOLUTION INTRAVENOUS PRN
Status: DISCONTINUED | OUTPATIENT
Start: 2022-11-16 | End: 2022-11-16 | Stop reason: SDUPTHER

## 2022-11-16 RX ORDER — SODIUM CHLORIDE 0.9 % (FLUSH) 0.9 %
10 SYRINGE (ML) INJECTION PRN
Status: DISCONTINUED | OUTPATIENT
Start: 2022-11-16 | End: 2022-11-18 | Stop reason: HOSPADM

## 2022-11-16 RX ORDER — METOCLOPRAMIDE HYDROCHLORIDE 5 MG/ML
INJECTION INTRAMUSCULAR; INTRAVENOUS PRN
Status: DISCONTINUED | OUTPATIENT
Start: 2022-11-16 | End: 2022-11-16 | Stop reason: SDUPTHER

## 2022-11-16 RX ORDER — METRONIDAZOLE 500 MG/100ML
500 INJECTION, SOLUTION INTRAVENOUS EVERY 8 HOURS
Status: DISCONTINUED | OUTPATIENT
Start: 2022-11-16 | End: 2022-11-17

## 2022-11-16 RX ORDER — 0.9 % SODIUM CHLORIDE 0.9 %
1000 INTRAVENOUS SOLUTION INTRAVENOUS ONCE
Status: COMPLETED | OUTPATIENT
Start: 2022-11-16 | End: 2022-11-16

## 2022-11-16 RX ORDER — OXYCODONE HYDROCHLORIDE AND ACETAMINOPHEN 5; 325 MG/1; MG/1
1 TABLET ORAL EVERY 6 HOURS PRN
Status: DISCONTINUED | OUTPATIENT
Start: 2022-11-16 | End: 2022-11-18 | Stop reason: HOSPADM

## 2022-11-16 RX ORDER — INSULIN LISPRO 100 [IU]/ML
0-8 INJECTION, SOLUTION INTRAVENOUS; SUBCUTANEOUS
Status: DISCONTINUED | OUTPATIENT
Start: 2022-11-16 | End: 2022-11-17

## 2022-11-16 RX ORDER — ONDANSETRON 2 MG/ML
INJECTION INTRAMUSCULAR; INTRAVENOUS PRN
Status: DISCONTINUED | OUTPATIENT
Start: 2022-11-16 | End: 2022-11-16 | Stop reason: SDUPTHER

## 2022-11-16 RX ORDER — ALBUTEROL SULFATE 90 UG/1
2 AEROSOL, METERED RESPIRATORY (INHALATION) EVERY 6 HOURS PRN
Status: DISCONTINUED | OUTPATIENT
Start: 2022-11-16 | End: 2022-11-18 | Stop reason: HOSPADM

## 2022-11-16 RX ORDER — NEOSTIGMINE METHYLSULFATE 5 MG/5 ML
SYRINGE (ML) INTRAVENOUS PRN
Status: DISCONTINUED | OUTPATIENT
Start: 2022-11-16 | End: 2022-11-16 | Stop reason: SDUPTHER

## 2022-11-16 RX ORDER — LIDOCAINE HYDROCHLORIDE 10 MG/ML
INJECTION, SOLUTION EPIDURAL; INFILTRATION; INTRACAUDAL; PERINEURAL PRN
Status: DISCONTINUED | OUTPATIENT
Start: 2022-11-16 | End: 2022-11-16 | Stop reason: SDUPTHER

## 2022-11-16 RX ORDER — GLYCOPYRROLATE 0.2 MG/ML
INJECTION INTRAMUSCULAR; INTRAVENOUS PRN
Status: DISCONTINUED | OUTPATIENT
Start: 2022-11-16 | End: 2022-11-16 | Stop reason: SDUPTHER

## 2022-11-16 RX ORDER — FENTANYL CITRATE 50 UG/ML
INJECTION, SOLUTION INTRAMUSCULAR; INTRAVENOUS PRN
Status: DISCONTINUED | OUTPATIENT
Start: 2022-11-16 | End: 2022-11-16 | Stop reason: SDUPTHER

## 2022-11-16 RX ORDER — INSULIN LISPRO 100 [IU]/ML
0-4 INJECTION, SOLUTION INTRAVENOUS; SUBCUTANEOUS NIGHTLY
Status: DISCONTINUED | OUTPATIENT
Start: 2022-11-16 | End: 2022-11-17

## 2022-11-16 RX ORDER — FENTANYL CITRATE 0.05 MG/ML
25 INJECTION, SOLUTION INTRAMUSCULAR; INTRAVENOUS EVERY 5 MIN PRN
Status: DISCONTINUED | OUTPATIENT
Start: 2022-11-16 | End: 2022-11-16

## 2022-11-16 RX ORDER — DIPHENHYDRAMINE HYDROCHLORIDE 50 MG/ML
12.5 INJECTION INTRAMUSCULAR; INTRAVENOUS
Status: ACTIVE | OUTPATIENT
Start: 2022-11-16 | End: 2022-11-17

## 2022-11-16 RX ADMIN — METRONIDAZOLE 500 MG: 500 INJECTION, SOLUTION INTRAVENOUS at 23:19

## 2022-11-16 RX ADMIN — SODIUM CHLORIDE 80 ML: 9 INJECTION, SOLUTION INTRAVENOUS at 13:28

## 2022-11-16 RX ADMIN — ASPIRIN 81 MG: 81 TABLET, CHEWABLE ORAL at 20:11

## 2022-11-16 RX ADMIN — SODIUM CHLORIDE: 9 INJECTION, SOLUTION INTRAVENOUS at 17:23

## 2022-11-16 RX ADMIN — ROCURONIUM BROMIDE 10 MG: 10 INJECTION, SOLUTION INTRAVENOUS at 17:25

## 2022-11-16 RX ADMIN — VANCOMYCIN HYDROCHLORIDE 2500 MG: 1.5 INJECTION, POWDER, LYOPHILIZED, FOR SOLUTION INTRAVENOUS at 15:49

## 2022-11-16 RX ADMIN — SODIUM CHLORIDE, PRESERVATIVE FREE 10 ML: 5 INJECTION INTRAVENOUS at 13:28

## 2022-11-16 RX ADMIN — CEFEPIME 2000 MG: 2 INJECTION, POWDER, FOR SOLUTION INTRAVENOUS at 15:18

## 2022-11-16 RX ADMIN — ONDANSETRON 4 MG: 2 INJECTION INTRAMUSCULAR; INTRAVENOUS at 17:47

## 2022-11-16 RX ADMIN — LISINOPRIL 5 MG: 5 TABLET ORAL at 20:11

## 2022-11-16 RX ADMIN — ATORVASTATIN CALCIUM 20 MG: 20 TABLET, FILM COATED ORAL at 20:11

## 2022-11-16 RX ADMIN — LIDOCAINE HYDROCHLORIDE 50 MG: 10 INJECTION, SOLUTION EPIDURAL; INFILTRATION; INTRACAUDAL; PERINEURAL at 17:25

## 2022-11-16 RX ADMIN — FENTANYL CITRATE 100 MCG: 50 INJECTION, SOLUTION INTRAMUSCULAR; INTRAVENOUS at 17:25

## 2022-11-16 RX ADMIN — OXYCODONE HYDROCHLORIDE AND ACETAMINOPHEN 1 TABLET: 5; 325 TABLET ORAL at 20:11

## 2022-11-16 RX ADMIN — Medication 160 MG: at 17:25

## 2022-11-16 RX ADMIN — Medication 5 MG: at 17:58

## 2022-11-16 RX ADMIN — MORPHINE SULFATE 4 MG: 4 INJECTION, SOLUTION INTRAMUSCULAR; INTRAVENOUS at 15:16

## 2022-11-16 RX ADMIN — METRONIDAZOLE 500 MG: 500 INJECTION, SOLUTION INTRAVENOUS at 15:33

## 2022-11-16 RX ADMIN — IOPAMIDOL 75 ML: 755 INJECTION, SOLUTION INTRAVENOUS at 13:27

## 2022-11-16 RX ADMIN — HYDROCHLOROTHIAZIDE 25 MG: 25 TABLET ORAL at 20:11

## 2022-11-16 RX ADMIN — CEFAZOLIN 3 G: 1 INJECTION, POWDER, FOR SOLUTION INTRAMUSCULAR; INTRAVENOUS at 17:34

## 2022-11-16 RX ADMIN — SODIUM CHLORIDE, PRESERVATIVE FREE 10 ML: 5 INJECTION INTRAVENOUS at 13:29

## 2022-11-16 RX ADMIN — GLYCOPYRROLATE 0.6 MG: 0.2 INJECTION, SOLUTION INTRAMUSCULAR; INTRAVENOUS at 17:58

## 2022-11-16 RX ADMIN — PROPOFOL 200 MG: 10 INJECTION, EMULSION INTRAVENOUS at 17:25

## 2022-11-16 RX ADMIN — METOCLOPRAMIDE 10 MG: 5 INJECTION, SOLUTION INTRAMUSCULAR; INTRAVENOUS at 17:47

## 2022-11-16 RX ADMIN — MIDAZOLAM 2 MG: 1 INJECTION INTRAMUSCULAR; INTRAVENOUS at 17:24

## 2022-11-16 RX ADMIN — SODIUM CHLORIDE 1000 ML: 9 INJECTION, SOLUTION INTRAVENOUS at 15:15

## 2022-11-16 ASSESSMENT — ENCOUNTER SYMPTOMS
NAUSEA: 0
ABDOMINAL PAIN: 0
VOMITING: 0
CHOKING: 0
SHORTNESS OF BREATH: 0
COUGH: 0
CHEST TIGHTNESS: 0
DIARRHEA: 0

## 2022-11-16 ASSESSMENT — PAIN SCALES - GENERAL
PAINLEVEL_OUTOF10: 5
PAINLEVEL_OUTOF10: 10
PAINLEVEL_OUTOF10: 0
PAINLEVEL_OUTOF10: 9

## 2022-11-16 ASSESSMENT — PAIN DESCRIPTION - DESCRIPTORS: DESCRIPTORS: DISCOMFORT

## 2022-11-16 ASSESSMENT — LIFESTYLE VARIABLES: SMOKING_STATUS: 1

## 2022-11-16 ASSESSMENT — PAIN - FUNCTIONAL ASSESSMENT: PAIN_FUNCTIONAL_ASSESSMENT: 0-10

## 2022-11-16 ASSESSMENT — PAIN DESCRIPTION - LOCATION: LOCATION: GROIN

## 2022-11-16 NOTE — ANESTHESIA POSTPROCEDURE EVALUATION
POST- ANESTHESIA EVALUATION       Pt Name: Hal Peña  MRN: [id number]  YOB: 1965  Date of evaluation: 11/16/2022  Time:  6:34 PM      BP (!) 102/51   Pulse 89   Temp 98.6 °F (37 °C) (Infrared)   Resp (!) 8   Ht 6' (1.829 m)   Wt (!) 320 lb (145.2 kg)   SpO2 98%   BMI 43.40 kg/m²      Consciousness Level  Awake  Cardiopulmonary Status  Stable  Pain Adequately Treated YES  Nausea / Vomiting  NO  Adequate Hydration  YES  Anesthesia Related Complications NONE      Electronically signed by Jose Emerson MD on 11/16/2022 at 6:34 PM       Department of Anesthesiology  Postprocedure Note    Patient: Hal Peña  MRN: [id number]  YOB: 1965  Date of evaluation: 11/16/2022      Procedure Summary     Date: 11/16/22 Room / Location: [address] [place] [address] Elizabeth Solares 04 / [facility] 167    Anesthesia Start: 5673 Anesthesia Stop: 1968    Procedure: EXPLORATION OF SCROTUM, PERINEUM, LEFT INGUINAL REGION. INCISION AND DRAINAGE WITH WASHOUT Diagnosis:       Cellulitis of scrotum      (CELLULITIS OF SCROTUM)      (IP RM ER F)    Surgeons: Debera Severance., MD Responsible Provider: Jose Emerson MD    Anesthesia Type: general ASA Status: 3          Anesthesia Type: No value filed.     Hemanth Phase I:      Hemanth Phase II:        Anesthesia Post Evaluation

## 2022-11-16 NOTE — ED PROVIDER NOTES
[address] ED  eMERGENCY dEPARTMENT eNCOUnter      Pt Name: Sara Joya  MRN: [id number]  [place] 1965  Date of evaluation: 11/16/22      CHIEF COMPLAINT:  Chief Complaint   Patient presents with    Abscess       HISTORY OF PRESENT ILLNESS    Sara Joya is a 65 y.o. male with hx of Diabetes, hidradenitis suppurativa presents to the emergency room complaining of left testicular abscess x several days. Pt reports it is painful, swollen and red. Reports several small abscesses around the main one. He denies fever, chills, cp, sob, nausea, emesis, abd pain. He reports having to have abscesses drained in his axillas. Denies ever having a testicular abscess this bad. He is a . No other complaints. Last drank an ice coffee at 1230pm.  Has not ate since last night. He is not on [facility]. Nursing Notes were reviewed. REVIEW OF SYSTEMS       Constitutional:  Per HPI  Eyes: No visual changes. Neck: No neck pain. Respiratory: Denies recent shortness of breath. Cardiac:  Denies recent chest pain. GI:  Per HPI  Musculoskeletal: Denies focal weakness. Neurologic: Denies headache or focal weakness. Skin:  rash    Negative in 10 essential Systems except as mentioned above and in the HPI. PAST MEDICAL HISTORY   PMH:  has a past medical history of Anesthesia, Diabetes mellitus ([address] Hypertension, Sleep apnea, Umbilical hernia, and Wears dentures. Surgical History:  has a past surgical history that includes Umbilical hernia repair (5/24/16). Social History:  reports that he has been smoking. He has a 45.00 pack-year smoking history. He has never used smokeless tobacco. He reports that he does not drink alcohol and does not use drugs. Family History: None  Psychiatric History: None    Allergies:is allergic to [place] and [place] [sitagliptin].       PHYSICAL EXAM     INITIAL VITALS: /77   Pulse 90   Temp 98.4 °F (36.9 °C)   Resp 16   Ht 6' (1.829 m)   Wt (!) 320 lb (145.2 kg)   SpO2 96%   BMI 43.40 kg/m²   Physical Exam  Vitals and nursing note reviewed. Exam conducted with a chaperone present. Constitutional:       Appearance: Normal appearance. He is obese. Cardiovascular:      Rate and Rhythm: Normal rate and regular rhythm. Pulses: Normal pulses. Heart sounds: Normal heart sounds, S1 normal and S2 normal.   Pulmonary:      Effort: Pulmonary effort is normal.      Breath sounds: Normal breath sounds and air entry. Abdominal:      Palpations: Abdomen is soft. Tenderness: There is no abdominal tenderness. Genitourinary:     Penis: Circumcised. No erythema, tenderness, discharge, swelling or lesions. Testes:         Right: Swelling present. Tenderness, testicular hydrocele or varicocele not present. Right testis is descended. Cremasteric reflex is present. Left: Tenderness and swelling present. Testicular hydrocele or varicocele not present. Left testis is descended. Cremasteric reflex is present. Epididymis:      Right: Normal.      Left: Normal.      Comments: 4cm x 4cm area of erythema,  induration and tenderness over left testicle. Pt has hidradenitis suppurativa over scrotum. 1cm x 1cm open area over right testicle. Several small abscesses. Penis appears normal. No drainage. No crepitus. No necrotic tissue or drainage. Skin:     Capillary Refill: Capillary refill takes less than 2 seconds. Findings: Abscess and erythema present. Neurological:      General: No focal deficit present. Mental Status: He is alert and oriented to person, place, and time. Mental status is at baseline. DIAGNOSTIC RESULTS     EKG: All EKG's are interpreted by the Emergency Department Physician who either signs or Co-signs this chart in the absence of a cardiologist.  Not indicated    RADIOLOGY:   Reviewed the radiologist:  CT ABDOMEN PELVIS W IV CONTRAST Additional Contrast? None   Final Result   1.   Three pelvic soft tissue abscesses, largest within the left scrotum, as   above. Extensive surrounding cellulitis. Surgical consultation recommended. 2.  Indeterminate spiculated left lower lobe nodule. Differential   considerations include infectious, inflammatory or neoplastic etiologies. Follow-up CT chest recommended in 4-6 weeks to document stability following   treatment. Alternatively, PET/CT scan may be useful as clinically warranted. 3.  Bilateral L4 pars interarticularis defects with grade 2 anterolisthesis   of L4 on L5 and secondary severe central canal stenosis at this level. 4.  Additional nonemergent findings, as above. Not indicated      LABS:  Labs Reviewed   CBC WITH AUTO DIFFERENTIAL - Abnormal; Notable for the following components:       Result Value    WBC 13.8 (*)     Seg Neutrophils 77 (*)     Lymphocytes 14 (*)     Segs Absolute 10.70 (*)     All other components within normal limits   BASIC METABOLIC PANEL - Abnormal; Notable for the following components:    Glucose 277 (*)     Sodium 130 (*)     Chloride 95 (*)     All other components within normal limits   CULTURE, BLOOD 1   CULTURE, BLOOD 1   LACTIC ACID         EMERGENCY DEPARTMENT COURSE:   -------------------------  Diabetic, hx of hidradenitis, . Left testicular abscess and pain. Afebrile. Ct scan is showing several abscesses with extensive cellulitis. There is possible fistula formation. LAURO Del Toro who accepted admission. LAURO Kiser who accepted consult. He will take patient to the OR today at 5pm.   Pt drank an iced coffee at 1230pm.  Has not had any other liquids or foods since last night. Vanco, cefepime and flagyl started. Morphine for pain control. Pt is agreeable to admission and surgery. Patient was given oral antibiotics for bacterial coverage and both verbal and written instructions to follow up to ED or Family Doctor in two days for recheck and/or packing change.  Was instructed to return for any worsening of the abscess or surrounding cellulitis. The care is provided during an unprecedented national emergency due to the novel coronavirus, COVID-19. Orders Placed This Encounter   Medications    0.9 % sodium chloride bolus    0.9 % sodium chloride bolus    sodium chloride flush 0.9 % injection 10 mL    iopamidol (ISOVUE-370) 76 % injection 75 mL    vancomycin (VANCOCIN) 2,500 mg in dextrose 5 % 500 mL IVPB     Order Specific Question:   Antimicrobial Indications     Answer:   Skin and Soft Tissue Infection    vancomycin (VANCOCIN) intermittent dosing (placeholder)     Order Specific Question:   Antimicrobial Indications     Answer:   Skin and Soft Tissue Infection    vancomycin 1000 mg IVPB in 250 mL D5W addavial     Order Specific Question:   Antimicrobial Indications     Answer:   Skin and Soft Tissue Infection    cefepime (MAXIPIME) 2,000 mg in sodium chloride 0.9 % 50 mL IVPB mini-bag     Order Specific Question:   Antimicrobial Indications     Answer:   Skin and Soft Tissue Infection    metronidazole (FLAGYL) 500 mg in 0.9% NaCl 100 mL IVPB premix     Order Specific Question:   Antimicrobial Indications     Answer:   Skin and Soft Tissue Infection    morphine sulfate (PF) injection 4 mg       CONSULTS:  PHARMACY TO DOSE VANCOMYCIN  IP CONSULT TO UROLOGY  IP CONSULT TO INTERNAL MEDICINE      FINAL IMPRESSION      1. Cellulitis of scrotum    2. Abscess of scrotum                DISPOSITION/PLAN:  DISPOSITION Decision To Admit 11/16/2022 03:11:21 PM        PATIENT REFERRED TO:  No follow-up provider specified.     DISCHARGE MEDICATIONS:  New Prescriptions    No medications on file       (Please note that portions of this note were completed with a voice recognition program.  Efforts were made to edit the dictations but occasionally words are mis-transcribed.)    TODD Reyes PA-C  11/16/22 8338

## 2022-11-16 NOTE — PROGRESS NOTES
Medication History completed:    New medications: none    Medications discontinued: Rybelsus, prednisolone acetate ophthalmic, Jardiance    Changes to dosing: none    Stated allergies: As listed    Other pertinent information: Medications confirmed with [facility]. The patient reports, and the dispense report confirms, that he has been out of atorvastatin and hydrochlorothiazide for several months. 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 He states he will not go back on Jardiance. He reports difficulty with insurance coverage of diabetic testing supplies.      Thank you,  Karley Rosales, PharmD, BCPS  [phone]

## 2022-11-16 NOTE — OP NOTE
FACILITY:  [facility]  1965  [id number]    DATE: 11/16/22   SURGEON: Dr. Flower Melendez MD , M.D.  Lorenza Mayen: Dr. Flower Melendez MD MD  PREOPERATIVE DIAGNOSIS:  Scrotal and Perineal abscess   POSTOPERATIVE DIAGNOSIS: same  OPERATION:  1. Perineal exploration  2. Scrotal exploration  3. I&D of scrotal abscess  ANESTHESIA:  MAC.   COMPLICATIONS:  None. ESTIMATED BLOOD LOSS:  Minimal.  FLUIDS:  Crystalloid. DRAINS:  Krilex packing. SPECIMENS:  Aerobic and anaerobic cultures. INDICATIONS FOR THE PROCEDURE:  Carlee Serrato is a 65 y.o. male with scrotal and perineal abscess. After treatment options were discussed including risks, benefits, alternatives, goals and possible complications,  the patient elected to proceed with today's procedure. NARRATIVE SUMMARY OF THE PROCEDURE:  The patient was identified in the pre op area, Risks benefits and alternative procedures are explained, informed consent is obtained, and the patient elects to proceed. He was taken back to the OR, placed in lithotomy position and induced under general anesthesia. He was prepped and draped in the usual fashion. A time out was taken per protocol with everyone in agreement. There was an obvious abscess in the scrotum which was opened and drained purulent fluid. We explored the perineum and identified the extent of the abscess with blunt finger dissection. We also explored the inguinal region, and opened into a small pocket. We then extended the incision that was approx 6 in long and 3 in deep to the posterior scrotum, and explored the scrotum. There was a sinus track which was dissected bluntly and then opened to help with packing and drainage. At this time we irrigated with copious amount of fluid. Necrotic tissue was debrided and wound edges were healthy. Hemostasis was achieved. We packed the wound with wet to dry Krilex gauze.     Plan  Will be transferred back to room for continued observation  FU wound cultures  Wound care: Wet to dry packing changes BID.

## 2022-11-16 NOTE — CONSULTS
Kirsten Moore MD   Urology Consult Note     Patient:  Keyla Patten  MRN: [id number]  YOB: 1965    ATTENDING: Kirsten Moore MD     CHIEF COMPLAINT:  Scrotal, inguinal, and perineal abscess    HISTORY OF PRESENT ILLNESS:   The patient is a 65 y.o. male who presents with Large (6 cm) Scrotal, inguinal, and perineal abscess, which has air in the subcutaneous tissue. CT scan:  Left scrotal abscess measuring 4.6 x 3.2 x 4.7 cm with extensive   surrounding cellulitis. Small adjacent abscess at the junction of the right   scrotum/perineum measuring 3 x 1.8 x 1 cm, with possible fistulous connection   with the overlying skin (series 2/images 222-226). Small superficial   subcutaneous abscess within the left inguinal region measuring 0.6 x 1.3 x   1.2 cm. Patient's old records, notes and chart reviewed and summarized above. Past Medical History:    Past Medical History:   Diagnosis Date    Anesthesia     NEVER HAD, FEARFUL HE'LL WAKE UP DISORIENTED & HURT SOMEONE. Diabetes mellitus ([facility] [address]     Hypertension     Sleep apnea     CPAP MACHINE AT NIGHT.     Umbilical hernia     Wears dentures     UPPER        Past Surgical History:    Past Surgical History:   Procedure Laterality Date    UMBILICAL HERNIA REPAIR  5/24/16         Medications:    Scheduled Meds:   vancomycin  2,500 mg IntraVENous Once    vancomycin (VANCOCIN) intermittent dosing (placeholder)   Other RX Placeholder    [START ON 11/17/2022] vancomycin  1,000 mg IntraVENous Q12H     Continuous Infusions:  PRN Meds:.sodium chloride flush    Allergies:  Januvia [sitagliptin]    Social History:    Social History     Socioeconomic History    Marital status: Single     Spouse name: Not on file    Number of children: Not on file    Years of education: Not on file    Highest education level: Not on file   Occupational History    Not on file   Tobacco Use    Smoking status: Every Day     Packs/day: 3.00     Years: 15.00     Pack years: 45.00     Types: Cigarettes    Smokeless tobacco: Never   Substance and Sexual Activity    Alcohol use: No     Alcohol/week: 0.0 standard drinks     Comment: occasionally    Drug use: No    Sexual activity: Not Currently   Other Topics Concern    Not on file   Social History Narrative    Not on file     Social Determinants of Health     Financial Resource Strain: Low Risk     Difficulty of Paying Living Expenses: Not hard at all   Food Insecurity: No Food Insecurity    Worried About Running Out of Food in the Last Year: Never true    Ran Out of Food in the Last Year: Never true   Transportation Needs: Not on file   Physical Activity: Not on file   Stress: Not on file   Social Connections: Not on file   Intimate Partner Violence: Not on file   Housing Stability: Not on file       Family History:    Family History   Problem Relation Age of Onset    Diabetes Father     High Blood Pressure Father      Previous Urologic Family history: none    REVIEW OF SYSTEMS:  Constitutional: negative  Eyes: negative  Respiratory: negative  Cardiovascular: negative  Gastrointestinal: negative  Genitourinary: see HPI  Musculoskeletal: negative  Skin: negative   Neurological: negative  Hematological/Lymphatic: negative  Psychological: negative    Physical Exam:    This a 65 y.o. male   Patient Vitals for the past 24 hrs:   BP Temp Pulse Resp SpO2 Height Weight   11/16/22 1615 (!) 114/56 -- -- -- 94 % -- --   11/16/22 1550 111/62 -- -- -- 95 % -- --   11/16/22 1217 124/77 98.4 °F (36.9 °C) 90 16 96 % 6' (1.829 m) (!) 320 lb (145.2 kg)     Constitutional: Patient in no acute distress; Neuro: alert and oriented to person place and time. Psych: Mood and affect normal.  Skin: Normal  Lungs: Respiratory effort normal  Cardiovascular:  Normal peripheral pulses  Abdomen: Soft, non-tender, non-distended with no CVA, flank pain, hepatosplenomegaly or hernia. Kidneys normal.  Bladder non-tender and not distended.   Lymphatics: no palpable lymphadenopathy  Penis normal and circumcised  Urethral meatus normal  Scrotal exam normal  Testicles normal bilaterally  Epididymis normal bilaterally  No evidence of inguinal hernia  Anus and perineum normal  Normal rectal tone with no masses  Prostate soft, non-tender to palpation. No palpable nodules. Estimated 40 grams. LABS:  Recent Labs     11/16/22  1247   WBC 13.8*   HGB 14.6   HCT 42.8   MCV 83.3        Recent Labs     11/16/22  1247   *   K 4.8   CL 95*   CO2 23   BUN 17   CREATININE 1.11     Lab Results   Component Value Date    PSA 2.56 07/31/2021       Additional Lab/culture results:    Urinalysis: No results for input(s): COLORU, PHUR, LABCAST, WBCUA, RBCUA, MUCUS, TRICHOMONAS, YEAST, BACTERIA, CLARITYU, SPECGRAV, LEUKOCYTESUR, UROBILINOGEN, Joyceann Gray in the last 72 hours. Invalid input(s): NITRATE, GLUCOSEUKETONESUAMORPHOUS     -----------------------------------------------------------------  Imaging Results:    Assessment and Plan   Impression:    Patient Active Problem List   Diagnosis    Essential hypertension    MIGUEL treated with BiPAP    Type 2 diabetes mellitus with diabetic polyneuropathy, without long-term current use of insulin ([facility])    Low HDL (under 40)    Morbid obesity with BMI of 40.0-44.9, adult ([facility] [address]    [facility]able state [facility]    Hyperhomocysteinemia ([facility])    Wound cellulitis    Mixed hyperlipidemia    Polyneuropathy    Current smoker    Mild intermittent asthma without complication    Stage 3a chronic kidney disease ([facility])    Vitamin D deficiency    Arthritis of multiple sites    PVD (peripheral vascular disease) ([facility])    Dermatitis    Moderate asthma without complication    Testicular abscess       Plan:   Scrotal inguinal and perineal abscess, developing fourniers gangrene. Exploration and I&D of scrotum perineum and inguinal region for founiers gangrene.    Risks benefits and alternative procedures are explained, informed consent is obtained, and the patient elects to proceed.      Amrita Lacy MD  5:02 PM 11/16/2022

## 2022-11-16 NOTE — ED PROVIDER NOTES
Pt Name: Noemí Vazquez  MRN: [id number]  [place] 1965  Date of evaluation: 11/16/22   Noemí Vazquez is a 65 y.o. male with CC: Abscess    MDM:   This visit was performed by both a physician and an APC. I performed all aspects of the MDM as documented. Admitted for iv abx  Pelvic tissue abscesses  Hx of hydradenitis  Admit to medicine with urology consult     RADIOLOGY:All plain film, CT, MRI, and formal ultrasound images (except ED bedside ultrasound) are read by the radiologist, see reports below, unless otherwise noted in MDM or here. CT ABDOMEN PELVIS W IV CONTRAST Additional Contrast? None   Final Result   1. Three pelvic soft tissue abscesses, largest within the left scrotum, as   above. Extensive surrounding cellulitis. Surgical consultation recommended. 2.  Indeterminate spiculated left lower lobe nodule. Differential   considerations include infectious, inflammatory or neoplastic etiologies. Follow-up CT chest recommended in 4-6 weeks to document stability following   treatment. Alternatively, PET/CT scan may be useful as clinically warranted. 3.  Bilateral L4 pars interarticularis defects with grade 2 anterolisthesis   of L4 on L5 and secondary severe central canal stenosis at this level. 4.  Additional nonemergent findings, as above. LABS: All lab results were reviewed by myself, and all abnormals are listed below.   Labs Reviewed   CBC WITH AUTO DIFFERENTIAL - Abnormal; Notable for the following components:       Result Value    WBC 13.8 (*)     Seg Neutrophils 77 (*)     Lymphocytes 14 (*)     Segs Absolute 10.70 (*)     All other components within normal limits   BASIC METABOLIC PANEL - Abnormal; Notable for the following components:    Glucose 277 (*)     Sodium 130 (*)     Chloride 95 (*)     All other components within normal limits   CULTURE, BLOOD 1   CULTURE, BLOOD 1   LACTIC ACID     CONSULTS:  PHARMACY TO DOSE VANCOMYCIN  IP CONSULT TO UROLOGY  IP CONSULT TO INTERNAL MEDICINE  FINAL IMPRESSION    No diagnosis found. PASTMEDICAL HISTORY     Past Medical History:   Diagnosis Date    Anesthesia     NEVER HAD, FEARFUL HE'LL WAKE UP DISORIENTED & HURT SOMEONE. Diabetes mellitus ([address]     Hypertension     Sleep apnea     CPAP MACHINE AT NIGHT. Umbilical hernia     Wears dentures     UPPER      SURGICAL HISTORY       Past Surgical History:   Procedure Laterality Date    UMBILICAL HERNIA REPAIR  5/24/16     CURRENT MEDICATIONS       Previous Medications    ACETAMINOPHEN (TYLENOL) 500 MG TABLET    Take 1 tablet by mouth every 6 hours as needed for Pain (Take 1-2 tablets every 6 hours)    ALBUTEROL SULFATE HFA (PROVENTIL;VENTOLIN;PROAIR) 108 (90 BASE) MCG/ACT INHALER    Inhale 2 puffs into the lungs every 6 hours as needed for Wheezing    ASPIRIN 81 MG CHEWABLE TABLET    Take 1 tablet by mouth daily    ATORVASTATIN (LIPITOR) 20 MG TABLET    Take 1 tablet by mouth once daily    B COMPLEX VITAMINS CAPSULE    Take 1 capsule by mouth daily    FLUTICASONE-SALMETEROL (ADVAIR HFA) 115-21 MCG/ACT INHALER    Inhale 2 puffs into the lungs in the morning and 2 puffs before bedtime. HYDROCHLOROTHIAZIDE (HYDRODIURIL) 25 MG TABLET    Take 1 tablet by mouth daily    LISINOPRIL (PRINIVIL;ZESTRIL) 5 MG TABLET    Take 1 tablet by mouth once daily    METFORMIN (GLUCOPHAGE) 1000 MG TABLET    TAKE 1 TABLET BY MOUTH TWICE DAILY WITH MEALS    MULTIPLE VITAMINS-MINERALS (THERAPEUTIC MULTIVITAMIN-MINERALS) TABLET    Take 1 tablet by mouth daily    SEMAGLUTIDE (RYBELSUS) 3 MG TABS    TAKE 1 TABLET BY MOUTH ONCE DAILY ON  AN  EMPTY  STOMACH  WITH  4  OZ  OF  WATER. WAIT  30  MINUTES  BEFORE  TAKING  ANYTHING  ELSE     ALLERGIES     is allergic to [place] [sitagliptin]. FAMILY HISTORY     He indicated that his mother is alive. He indicated that his father is alive.      SOCIAL HISTORY       Social History     Tobacco Use    Smoking status: Every Day     Packs/day: 3.00     Years: 15.00     Pack years: 45.00     Types: Cigarettes    Smokeless tobacco: Never   Substance Use Topics    Alcohol use: No     Alcohol/week: 0.0 standard drinks     Comment: occasionally    Drug use: No          Carey Robbins MD  The care is provided during an unprecedented national emergency due to the novel coronavirus, COVID 19.   Attending Emergency Physician        Carey Robbins MD  11/16/22 0173

## 2022-11-16 NOTE — PROGRESS NOTES
Vancomycin Dosing by Pharmacy - Initial Note   TODAY'S DATE:  11/16/2022  Patient name, age:  Jeovanny Mitchell, 65 y.o. Indication: SSTI, MRSA suspected. Additional antimicrobials:  none    Allergies:  Januvia [sitagliptin]   Actual Weight:    Wt Readings from Last 1 Encounters:   11/16/22 (!) 320 lb (145.2 kg)     Labs/Ancillary Data  Estimated Creatinine Clearance: 110 mL/min (based on SCr of 1.11 mg/dL). Recent Labs     11/16/22  1247   CREATININE 1.11   BUN 17   WBC 13.8*     No results found for: PROCAL  No intake or output data in the 24 hours ending 11/16/22 1438  Temp: 98.4    Recent vancomycin administrations        No vancomycin IV orders with administrations found. Orders not given:            vancomycin (VANCOCIN) 2,500 mg in dextrose 5 % 500 mL IVPB    vancomycin (VANCOCIN) intermittent dosing (placeholder)    vancomycin 1000 mg IVPB in 250 mL D5W addavial                  Culture Date / Source  /  Results  See micro    MRSA Nasal Swab: was ordered by provider, awaiting results. Hortencia Ruffing PLAN   Initial loading dose of 25mg/kg (max of 2,500 mg) = 2500  mg  x 1, then  vancomycin 1000 mg IV every 12 hours. Ensured BUN/sCr ordered at baseline and every 48 hours x at least 3 levels, then at least weekly. Vancomycin level ordered for 11/17 @ 1200. Will use bayesian method for dosing. This level will not be a trough. Target AUC/SAMANTHA: 400-600. Vancomycin Target Concentration Parameters  Treatment  Population Target AUC/SAMANTHA Target Trough   Invasive MRSA Infection (bacteremia, pneumonia, meningitis, endocarditis, osteomyelitis)  Sepsis (undifferentiated) 400-600 N/A   Infection due to non-MRSA pathogen  Empiric treatment of non-invasive MRSA infection  (SSTI, UTI) <500 10-15 mg/L   CrCl < 29 mL/min  Rapidly fluctuating serum creatinine   ANNA N/A < 15 mg/L     Renal replacement therapy is dosed by levels, per hospital protocol.   Abbreviations  * Pauc: probability that AUC is >400 (efficacy); Pconc: probability that Ctrough is above 20 ?g/mL (toxicity); Tox: Probability of nephrotoxicity, based on Lodoleksandr et al. Clin Infect Dis 2009. Loading dose: 2500 mg at 16:00 11/16/2022. Regimen: 1000 mg IV every 12 hours. Start time: 14:33 on 11/16/2022  Exposure target: AUC24 (range)400-600 mg/L.hr   AUC24,ss: 485 mg/L.hr  Probability of AUC24 > 400: 64 %  Ctrough,ss: 13.8 mg/L  Probability of Ctrough,ss > 20: 31 %  Probability of nephrotoxicity (Lodise JOYCE 2009): 9 %      Thank you for the consult. Pharmacy will continue to follow.    Kiran Minor RPh  11/16/2022  2:39 PM

## 2022-11-16 NOTE — H&P
[facility]     HISTORY AND PHYSICAL EXAMINATION            Date:   11/16/2022  Patient name:  Manpreet Mejia  Date of admission:  11/16/2022 12:21 PM  MRN:   [id number]  Account:  [de-identified]  YOB: 1965  PCP:    ELDA Rubin CNP  Room:   F/F  Code Status:    Prior    Chief Complaint:     Chief Complaint   Patient presents with    Abscess     History Obtained From:     patient    History of Present Illness: The patient is a 65 y.o. Non- / non  male who presents withAbscess   and he is admitted to the hospital for the management of testicular abscess. Patient is a 79-year-old male past medical history significant for obesity,  type 2 diabetes, hypertension, CKD stage IIIa, MIGUEL on CPAP, hidradenitis suppurativa who presented to the ED on 11/16/2022 with chief complaint of left testicular pain, swelling, tenderness for the past several days. He denies fever/chills, nausea/vomiting, abdominal or pelvic pain. Patient endorses left testicular pain/tenderness and noticed increased swelling that prompted him to come to the ED. In ED, vitals were unremarkable. Labs were significant for hyponatremia and leukocytosis with white blood cell of 13.8. Lactic acid unremarkable 1.5. CT abdomen and pelvis remarkable for 3 pelvic soft tissue abscesses largest within left scrotum measuring 4.6 x 3.2 x 4.7 cm with extensive surrounding cellulitis. Blood cultures were obtained. He was given cefepime, Flagyl, vancomycin x1 in ED. Urology was consulted. Urology Dr. Pretty Soria will take patient to the OR today at 5 PM.  Patient will be admitted to [facility] for management of scrotal cellulitis and abscess.     Past Medical History:     Past Medical History:   Diagnosis Date    Anesthesia     NEVER HAD, FEARFUL HE'LL WAKE UP DISORIENTED & HURT SOMEONE.  Diabetes mellitus ([facility]     Hypertension     Sleep apnea     CPAP MACHINE AT NIGHT.  Umbilical hernia     Wears dentures     UPPER         Past SurgicalHistory:     Past Surgical History:   Procedure Laterality Date    UMBILICAL HERNIA REPAIR  5/24/16        Medications Prior to Admission:        Prior to Admission medications    Medication Sig Start Date End Date Taking? Authorizing Provider   fluticasone-salmeterol (ADVAIR HFA) 115-21 MCG/ACT inhaler Inhale 2 puffs into the lungs in the morning and 2 puffs before bedtime. 7/19/22   ELDA Ruiz CNP   lisinopril (PRINIVIL;ZESTRIL) 5 MG tablet Take 1 tablet by mouth once daily 7/8/22   ELDA Ruiz CNP   metFORMIN (GLUCOPHAGE) 1000 MG tablet TAKE 1 TABLET BY MOUTH TWICE DAILY WITH MEALS 7/8/22   ELDA Ruiz CNP   Semaglutide (RYBELSUS) 3 MG TABS TAKE 1 TABLET BY MOUTH ONCE DAILY ON  AN  EMPTY  STOMACH  WITH  4  OZ  OF  WATER.   WAIT  30  MINUTES  BEFORE  TAKING  ANYTHING  ELSE 6/20/22   ELDA Ruiz CNP   albuterol sulfate HFA (PROVENTIL;VENTOLIN;PROAIR) 108 (90 Base) MCG/ACT inhaler Inhale 2 puffs into the lungs every 6 hours as needed for Wheezing 6/20/22 9/18/22  ELDA Ruiz CNP   hydroCHLOROthiazide (HYDRODIURIL) 25 MG tablet Take 1 tablet by mouth daily 11/8/21   ELDA Ruiz CNP   atorvastatin (LIPITOR) 20 MG tablet Take 1 tablet by mouth once daily 11/8/21   ELDA Ruiz CNP   acetaminophen (TYLENOL) 500 MG tablet Take 1 tablet by mouth every 6 hours as needed for Pain (Take 1-2 tablets every 6 hours) 8/16/21 6/20/22  ELDA Ruiz CNP   aspirin 81 MG chewable tablet Take 1 tablet by mouth daily 6/11/21   ELDA Ruiz CNP   glimepiride (AMARYL) 4 MG tablet Take 1 tablet by mouth 2 times daily 6/11/21   ELDA Ruiz CNP   lisinopril (PRINIVIL;ZESTRIL) 5 MG tablet Take 1 tablet by mouth Conjunctiva/sclera: Conjunctivae normal.   Cardiovascular:      Rate and Rhythm: Normal rate and regular rhythm. Heart sounds: No murmur heard. No gallop. Pulmonary:      Effort: Pulmonary effort is normal. No respiratory distress. Breath sounds: Normal breath sounds. No wheezing. Abdominal:      General: Bowel sounds are normal. There is no distension. Tenderness: There is no abdominal tenderness. Genitourinary:     Testes:         Right: Tenderness or swelling not present. Left: Tenderness and swelling present. Comments: Hidradenitis suppurativa scars and nodules are present bilateral groin, pubic/genitofemoral areas  4cm x 4cm area of erythema,  induration and tenderness over left testicle  No drainage/pus    Musculoskeletal:      Right lower leg: No edema. Left lower leg: No edema. Skin:     Findings: Erythema and rash present. Comments: Hidradenitis suppurativa scars/nodules in bilateral axillary regions   Neurological:      Mental Status: He is alert and oriented to person, place, and time.    Psychiatric:         Mood and Affect: Mood normal.         Behavior: Behavior normal.       Investigations:     Laboratory Testing:  Recent Results (from the past 24 hour(s))   CBC with Auto Differential    Collection Time: 11/16/22 12:47 PM   Result Value Ref Range    WBC 13.8 (H) 3.5 - 11.0 k/uL    RBC 5.14 4.5 - 5.9 m/uL    Hemoglobin 14.6 13.5 - 17.5 g/dL    Hematocrit 42.8 41 - 53 %    MCV 83.3 80 - 100 fL    MCH 28.5 26 - 34 pg    MCHC 34.2 31 - 37 g/dL    RDW 14.4 11.5 - 14.9 %    Platelets 651 466 - 845 k/uL    MPV 7.7 6.0 - 12.0 fL    Seg Neutrophils 77 (H) 36 - 66 %    Lymphocytes 14 (L) 24 - 44 %    Monocytes 6 1 - 7 %    Eosinophils % 2 0 - 4 %    Basophils 1 0 - 2 %    Segs Absolute 10.70 (H) 1.3 - 9.1 k/uL    Absolute Lymph # 1.90 1.0 - 4.8 k/uL    Absolute Mono # 0.90 0.1 - 1.3 k/uL    Absolute Eos # 0.30 0.0 - 0.4 k/uL    Basophils Absolute 0.10 0.0 - 0.2 k/uL   Basic Metabolic Panel    Collection Time: 11/16/22 12:47 PM   Result Value Ref Range    Glucose 277 (H) 70 - 99 mg/dL    BUN 17 6 - 20 mg/dL    Creatinine 1.11 0.70 - 1.20 mg/dL    Est, Glom Filt Rate >60 >60 mL/min/1.73m2    Calcium 9.3 8.6 - 10.4 mg/dL    Sodium 130 (L) 135 - 144 mmol/L    Potassium 4.8 3.7 - 5.3 mmol/L    Chloride 95 (L) 98 - 107 mmol/L    CO2 23 20 - 31 mmol/L    Anion Gap 12 9 - 17 mmol/L       Imaging/Diagnostics:  CT ABDOMEN PELVIS W IV CONTRAST Additional Contrast? None    Result Date: 11/16/2022  EXAMINATION: CT OF THE ABDOMEN AND PELVIS WITH CONTRAST 11/16/2022 1:24 pm TECHNIQUE: CT of the abdomen and pelvis was performed with the administration of intravenous contrast. Multiplanar reformatted images are provided for review. Automated exposure control, iterative reconstruction, and/or weight based adjustment of the mA/kV was utilized to reduce the radiation dose to as low as reasonably achievable. COMPARISON: None. HISTORY: ORDERING SYSTEM PROVIDED HISTORY: left testicular abscess, pain, erythema TECHNOLOGIST PROVIDED HISTORY: left testicular abscess, pain, erythema Decision Support Exception - unselect if not a suspected or confirmed emergency medical condition->Emergency Medical Condition (MA) Reason for Exam: left testicular abscess, pain, erythema Relevant Medical/Surgical History: hernia repair FINDINGS: Lower Chest: Somewhat spiculated solid 1.2 cm left lower lobe nodule (series 2/image 10). Organs: Within normal limits. GI/Bowel: Mild colonic diverticulosis without diverticulitis. No obstruction. Normal appendix. Pelvis: Left scrotal abscess measuring 4.6 x 3.2 x 4.7 cm with extensive surrounding cellulitis. Small adjacent abscess at the junction of the right scrotum/perineum measuring 3 x 1.8 x 1 cm, with possible fistulous connection with the overlying skin (series 2/images 222-226).   Small superficial subcutaneous abscess within the left inguinal region measuring 0.6 x 1.3 x 1.2 cm. Extensive subcutaneous edema and severe skin thickening throughout the scrotum/penis. No focal testicular abnormality identified by CT. Mild prostatomegaly. Peritoneum/Retroperitoneum:   Nonspecific inguinal adenopathy bilaterally. Bones/Soft Tissues: Bilateral L4 pars interarticularis defects with grade 2 anterolisthesis of L4 on L5 measuring 1.3 cm. Severe central canal stenosis at the L4-5 level noted. Ventral abdominal wall mesh hernia repair. 1.  Three pelvic soft tissue abscesses, largest within the left scrotum, as above. Extensive surrounding cellulitis. Surgical consultation recommended. 2.  Indeterminate spiculated left lower lobe nodule. Differential considerations include infectious, inflammatory or neoplastic etiologies. Follow-up CT chest recommended in 4-6 weeks to document stability following treatment. Alternatively, PET/CT scan may be useful as clinically warranted. 3.  Bilateral L4 pars interarticularis defects with grade 2 anterolisthesis of L4 on L5 and secondary severe central canal stenosis at this level. 4.  Additional nonemergent findings, as above. Assessment :      Primary Problem  <principal problem not specified>    There are no active hospital problems to display for this patient. Plan:     Patient status Admit as inpatient in the  Progressive Unit/Step down    Left-sided scrotal cellulitis/abscess in a pt with a history of hidradenitis suppurativa -rule out Ana's gangrene  -CT abdomen and pelvis on admission: 3 pelvic soft tissue abscesses, largest within left scrotal measuring 4.6 x 3.2 x 4.7 cm with extensive surrounding cellulitis.   -Blood cultures x2  -Patient received Flagyl, cefepime, Vanco x1 in ED  -We will start patient on broad-spectrum antibiotics Flagyl, cefepime, Vanco -pharmacy to dose Vanco  -Infectious disease consulted, will appreciate recommendations  -Urology consulted will plan to take patient to OR today at 5 PM for exploration and I&D of scrotum and inguinal region for Ana gangrene  -Pain and nausea control per orders  -CRP, Pro-Hussein, UA pending    Type 2 diabetes  -Last known A1c 9.8 on 6/20/2022  -Medium dose sliding scale  -Hypoglycemic protocol  -POCT glucose checks 4 times daily    Asthma  Continue home med Merribeth Minium -Proventil    Essential hypertension  -Continue home meds lisinopril 5 daily and hydrochlorothiazide 25 daily    MIGUEL on BiPAP  -Continue using BiPAP at night    CKD stage III  -Creatinine at baseline  -Avoid nephrotoxic drugs  -We will continue to monitor kidney function    Incidental finding of left lower lobe nodule   -CT chest abdomen and pelvis on admission: Indeterminate spiculated left lower lobe nodule. Differential - Follow-up CT chest recommended in 4-6 weeks to document stability     DVT prophylaxis: None  Diet: N.p.o. surgery scheduled for today at 5  CODE STATUS: Full code  Disposition: Home    Consultations:   PHARMACY TO DOSE VANCOMYCIN  IP CONSULT TO UROLOGY  IP CONSULT TO INTERNAL MEDICINE    Patient is admitted as inpatient status because of co-morbiditieslisted above, severity of signs and symptoms as outlined, requirement for current medical therapies and most importantly because of direct risk to patient if care not provided in a hospital setting. Caity Jung MD  11/16/2022  3:14 PM    Copy sent to ELDA Stephenson - CNP   Attending Physician Statement  I have discussed the care of [facility] and I have examined the patient myselft and taken ros and hpi , including pertinent history and exam findings,  with the resident. I have reviewed the key elements of all parts of the encounter with the resident. I agree with the assessment, plan and orders as documented by the resident. Spent 55 minutes in reviewing data/medicines/talking to patient/family,  explaining and answering all the questions.     Scrotal cellulitis/abscess       Electronically signed by Christos Walsh Claudette Gasca MD

## 2022-11-16 NOTE — ANESTHESIA PRE PROCEDURE
Department of Anesthesiology  Preprocedure Note       Name:  Kai Anguiano   Age:  65 y.o.  :  1965                                          MRN:  [id number]         Date:  2022      Surgeon: Noah Solorzano):  Daniel Cerda MD    Procedure: Procedure(s):  SCROTAL EXPLORATION    Medications prior to admission:   Prior to Admission medications    Medication Sig Start Date End Date Taking? Authorizing Provider   fluticasone-salmeterol (ADVAIR HFA) 115-21 MCG/ACT inhaler Inhale 2 puffs into the lungs in the morning and 2 puffs before bedtime.  22   ELDA Ruiz CNP   lisinopril (PRINIVIL;ZESTRIL) 5 MG tablet Take 1 tablet by mouth once daily 22   ELDA Ruiz CNP   metFORMIN (GLUCOPHAGE) 1000 MG tablet TAKE 1 TABLET BY MOUTH TWICE DAILY WITH MEALS 22   ELDA Ruiz CNP   albuterol sulfate HFA (PROVENTIL;VENTOLIN;PROAIR) 108 (90 Base) MCG/ACT inhaler Inhale 2 puffs into the lungs every 6 hours as needed for Wheezing 22  ELDA Ruiz CNP   hydroCHLOROthiazide (HYDRODIURIL) 25 MG tablet Take 1 tablet by mouth daily 21   ELDA Ruiz CNP   atorvastatin (LIPITOR) 20 MG tablet Take 1 tablet by mouth once daily 21   ELDA Ruiz CNP   acetaminophen (TYLENOL) 500 MG tablet Take 1 tablet by mouth every 6 hours as needed for Pain (Take 1-2 tablets every 6 hours) 21  ELDA Ruiz CNP   aspirin 81 MG chewable tablet Take 1 tablet by mouth daily 21   ELDA Ruiz CNP   glimepiride (AMARYL) 4 MG tablet Take 1 tablet by mouth 2 times daily 21   ELDA Ruiz CNP   lisinopril (PRINIVIL;ZESTRIL) 5 MG tablet Take 1 tablet by mouth daily 21   Renella A Gauamis, APRN - CNP   Multiple Vitamins-Minerals (THERAPEUTIC MULTIVITAMIN-MINERALS) tablet Take 1 tablet by mouth daily    Historical Provider, MD   b complex vitamins capsule Take 1 capsule by mouth daily    Historical Provider, MD       Current medications:    Current Facility-Administered Medications   Medication Dose Route Frequency Provider Last Rate Last Admin    0.9 % sodium chloride bolus  1,000 mL IntraVENous Once Jesus Lara PA-C 983.6 mL/hr at 11/16/22 1515 1,000 mL at 11/16/22 1515    sodium chloride flush 0.9 % injection 10 mL  10 mL IntraVENous PRN Aura Olivier PA-C   10 mL at 11/16/22 1329    vancomycin (VANCOCIN) 2,500 mg in dextrose 5 % 500 mL IVPB  2,500 mg IntraVENous Once Aura Olivier PA-C        vancomycin (VANCOCIN) intermittent dosing (placeholder)   Other RX Placeholder Aura Olivier PA-C        [START ON 11/17/2022] vancomycin 1000 mg IVPB in 250 mL D5W addavial  1,000 mg IntraVENous Q12H Aura Olivier PA-C        cefepime (MAXIPIME) 2,000 mg in sodium chloride 0.9 % 50 mL IVPB mini-bag  2,000 mg IntraVENous Once Aura Olviier PA-C 100 mL/hr at 11/16/22 1518 2,000 mg at 11/16/22 1518    metronidazole (FLAGYL) 500 mg in 0.9% NaCl 100 mL IVPB premix  500 mg IntraVENous Once Jesus Lara PA-C         Current Outpatient Medications   Medication Sig Dispense Refill    fluticasone-salmeterol (ADVAIR HFA) 115-21 MCG/ACT inhaler Inhale 2 puffs into the lungs in the morning and 2 puffs before bedtime.  1 each 2    lisinopril (PRINIVIL;ZESTRIL) 5 MG tablet Take 1 tablet by mouth once daily 90 tablet 2    metFORMIN (GLUCOPHAGE) 1000 MG tablet TAKE 1 TABLET BY MOUTH TWICE DAILY WITH MEALS 180 tablet 2    albuterol sulfate HFA (PROVENTIL;VENTOLIN;PROAIR) 108 (90 Base) MCG/ACT inhaler Inhale 2 puffs into the lungs every 6 hours as needed for Wheezing 9 each 0    hydroCHLOROthiazide (HYDRODIURIL) 25 MG tablet Take 1 tablet by mouth daily 90 tablet 1    atorvastatin (LIPITOR) 20 MG tablet Take 1 tablet by mouth once daily 90 tablet 1    acetaminophen (TYLENOL) 500 MG tablet Take 1 tablet by mouth every 6 hours as needed for Pain (Take 1-2 tablets every 6 hours) 120 tablet 1    aspirin 81 MG chewable tablet Take 1 tablet by mouth daily 90 tablet 5    Multiple Vitamins-Minerals (THERAPEUTIC MULTIVITAMIN-MINERALS) tablet Take 1 tablet by mouth daily      b complex vitamins capsule Take 1 capsule by mouth daily         Allergies: Allergies   Allergen Reactions    Januvia [Sitagliptin] Other (See Comments)     Constipation       Problem List:    Patient Active Problem List   Diagnosis Code    Essential hypertension I10    MIGUEL treated with BiPAP G47.33    Type 2 diabetes mellitus with diabetic polyneuropathy, without long-term current use of insulin ([facility]) E11.42    Low HDL (under 40) E78.6    Morbid obesity with BMI of 40.0-44.9, adult ([facility] E66.01, Z68.41    Hypercoagulable state ([facility] D68.59    Hyperhomocysteinemia ([facility]) E72.11    Wound cellulitis L03.90    Mixed hyperlipidemia E78.2    Polyneuropathy G62.9    Current smoker F17.200    Mild intermittent asthma without complication J75.16    Stage 3a chronic kidney disease ([facility]) N18.31    Vitamin D deficiency E55.9    Arthritis of multiple sites M13.0    PVD (peripheral vascular disease) ([facility]) I73.9    Dermatitis L30.9    Moderate asthma without complication D25.298    Testicular abscess N45.4       Past Medical History:        Diagnosis Date    Anesthesia     NEVER HAD, FEARFUL HE'LL WAKE UP DISORIENTED & HURT SOMEONE.  Diabetes mellitus ([facility]     Hypertension     Sleep apnea     CPAP MACHINE AT NIGHT.     Umbilical hernia     Wears dentures     UPPER        Past Surgical History:        Procedure Laterality Date    UMBILICAL HERNIA REPAIR  5/24/16       Social History:    Social History     Tobacco Use    Smoking status: Every Day     Packs/day: 3.00     Years: 15.00     Pack years: 45.00     Types: Cigarettes    Smokeless tobacco: Never   Substance Use Topics    Alcohol use: No     Alcohol/week: 0.0 standard drinks     Comment: occasionally Ready to quit: Not Answered  Counseling given: Not Answered      Vital Signs (Current):   Vitals:    11/16/22 1217   BP: 124/77   Pulse: 90   Resp: 16   Temp: 98.4 °F (36.9 °C)   SpO2: 96%   Weight: (!) 320 lb (145.2 kg)   Height: 6' (1.829 m)                                              BP Readings from Last 3 Encounters:   11/16/22 124/77   06/20/22 124/72   08/16/21 120/80       NPO Status:                                                                                 BMI:   Wt Readings from Last 3 Encounters:   11/16/22 (!) 320 lb (145.2 kg)   06/20/22 (!) 325 lb 3.2 oz (147.5 kg)   08/16/21 (!) 330 lb (149.7 kg)     Body mass index is 43.4 kg/m². CBC:   Lab Results   Component Value Date/Time    WBC 13.8 11/16/2022 12:47 PM    RBC 5.14 11/16/2022 12:47 PM    HGB 14.6 11/16/2022 12:47 PM    HCT 42.8 11/16/2022 12:47 PM    MCV 83.3 11/16/2022 12:47 PM    RDW 14.4 11/16/2022 12:47 PM     11/16/2022 12:47 PM       CMP:   Lab Results   Component Value Date/Time     11/16/2022 12:47 PM    K 4.8 11/16/2022 12:47 PM    CL 95 11/16/2022 12:47 PM    CO2 23 11/16/2022 12:47 PM    BUN 17 11/16/2022 12:47 PM    CREATININE 1.11 11/16/2022 12:47 PM    GFRAA 60 07/31/2021 10:37 AM    LABGLOM >60 11/16/2022 12:47 PM    GLUCOSE 277 11/16/2022 12:47 PM    PROT 7.9 07/31/2021 10:37 AM    CALCIUM 9.3 11/16/2022 12:47 PM    BILITOT 0.33 07/31/2021 10:37 AM    ALKPHOS 113 07/31/2021 10:37 AM    AST 15 07/31/2021 10:37 AM    ALT 27 07/31/2021 10:37 AM       POC Tests: No results for input(s): POCGLU, POCNA, POCK, POCCL, POCBUN, POCHEMO, POCHCT in the last 72 hours.     Coags: No results found for: PROTIME, INR, APTT    HCG (If Applicable): No results found for: PREGTESTUR, PREGSERUM, HCG, HCGQUANT     ABGs: No results found for: PHART, PO2ART, WVM6IPE, YFA3JWD, BEART, T6XLFFZG     Type & Screen (If Applicable):  No results found for: LABABO, LABRH    Drug/Infectious Status (If Applicable):  Lab Results   Component

## 2022-11-16 NOTE — ED TRIAGE NOTES
Mode of arrival (squad #, walk in, police, etc) : walk in         Chief complaint(s): abscess multiple         Arrival Note (brief scenario, treatment PTA, etc). : states he needs his left testicle drained         C= \"Have you ever felt that you should Cut down on your drinking? \"  No  A= \"Have people Annoyed you by criticizing your drinking? \"  No  G= \"Have you ever felt bad or Guilty about your drinking? \"  No  E= \"Have you ever had a drink as an Eye-opener first thing in the morning to steady your nerves or to help a hangover? \"  No      Deferred []      Reason for deferring: N/A    *If yes to two or more: probable alcohol abuse. *

## 2022-11-17 LAB
ABSOLUTE EOS #: 0.4 K/UL (ref 0–0.4)
ABSOLUTE LYMPH #: 1.9 K/UL (ref 1–4.8)
ABSOLUTE MONO #: 0.9 K/UL (ref 0.1–1.3)
ANION GAP SERPL CALCULATED.3IONS-SCNC: 9 MMOL/L (ref 9–17)
BASOPHILS # BLD: 1 % (ref 0–2)
BASOPHILS ABSOLUTE: 0.1 K/UL (ref 0–0.2)
BUN BLDV-MCNC: 20 MG/DL (ref 6–20)
CALCIUM SERPL-MCNC: 8.4 MG/DL (ref 8.6–10.4)
CHLORIDE BLD-SCNC: 100 MMOL/L (ref 98–107)
CO2: 24 MMOL/L (ref 20–31)
CREAT SERPL-MCNC: 1.36 MG/DL (ref 0.7–1.2)
EOSINOPHILS RELATIVE PERCENT: 3 % (ref 0–4)
GFR SERPL CREATININE-BSD FRML MDRD: >60 ML/MIN/1.73M2
GLUCOSE BLD-MCNC: 154 MG/DL (ref 75–110)
GLUCOSE BLD-MCNC: 199 MG/DL (ref 75–110)
GLUCOSE BLD-MCNC: 220 MG/DL (ref 75–110)
GLUCOSE BLD-MCNC: 221 MG/DL (ref 75–110)
GLUCOSE BLD-MCNC: 251 MG/DL (ref 70–99)
HCT VFR BLD CALC: 38.1 % (ref 41–53)
HEMOGLOBIN: 13 G/DL (ref 13.5–17.5)
LYMPHOCYTES # BLD: 15 % (ref 24–44)
MCH RBC QN AUTO: 28.5 PG (ref 26–34)
MCHC RBC AUTO-ENTMCNC: 34 G/DL (ref 31–37)
MCV RBC AUTO: 83.6 FL (ref 80–100)
MONOCYTES # BLD: 7 % (ref 1–7)
PDW BLD-RTO: 14.3 % (ref 11.5–14.9)
PLATELET # BLD: 223 K/UL (ref 150–450)
PMV BLD AUTO: 7.8 FL (ref 6–12)
POTASSIUM SERPL-SCNC: 4.8 MMOL/L (ref 3.7–5.3)
RBC # BLD: 4.56 M/UL (ref 4.5–5.9)
SEG NEUTROPHILS: 74 % (ref 36–66)
SEGMENTED NEUTROPHILS ABSOLUTE COUNT: 10 K/UL (ref 1.3–9.1)
SODIUM BLD-SCNC: 133 MMOL/L (ref 135–144)
VANCOMYCIN RANDOM DATE LAST DOSE: NORMAL
VANCOMYCIN RANDOM DOSE AMOUNT: NORMAL
VANCOMYCIN RANDOM TIME LAST DOSE: 249
VANCOMYCIN RANDOM: 16.9 UG/ML
WBC # BLD: 13.3 K/UL (ref 3.5–11)

## 2022-11-17 PROCEDURE — 6360000002 HC RX W HCPCS: Performed by: INTERNAL MEDICINE

## 2022-11-17 PROCEDURE — 6360000002 HC RX W HCPCS: Performed by: PHYSICIAN ASSISTANT

## 2022-11-17 PROCEDURE — 2580000003 HC RX 258: Performed by: INTERNAL MEDICINE

## 2022-11-17 PROCEDURE — 6370000000 HC RX 637 (ALT 250 FOR IP): Performed by: INTERNAL MEDICINE

## 2022-11-17 PROCEDURE — 99254 IP/OBS CNSLTJ NEW/EST MOD 60: CPT | Performed by: INTERNAL MEDICINE

## 2022-11-17 PROCEDURE — 80048 BASIC METABOLIC PNL TOTAL CA: CPT

## 2022-11-17 PROCEDURE — 1200000000 HC SEMI PRIVATE

## 2022-11-17 PROCEDURE — 6360000002 HC RX W HCPCS

## 2022-11-17 PROCEDURE — 6370000000 HC RX 637 (ALT 250 FOR IP): Performed by: UROLOGY

## 2022-11-17 PROCEDURE — 85025 COMPLETE CBC W/AUTO DIFF WBC: CPT

## 2022-11-17 PROCEDURE — 36415 COLL VENOUS BLD VENIPUNCTURE: CPT

## 2022-11-17 PROCEDURE — 2580000003 HC RX 258

## 2022-11-17 PROCEDURE — 6360000002 HC RX W HCPCS: Performed by: UROLOGY

## 2022-11-17 PROCEDURE — 82947 ASSAY GLUCOSE BLOOD QUANT: CPT

## 2022-11-17 PROCEDURE — 2500000003 HC RX 250 WO HCPCS

## 2022-11-17 PROCEDURE — 6370000000 HC RX 637 (ALT 250 FOR IP)

## 2022-11-17 PROCEDURE — 80202 ASSAY OF VANCOMYCIN: CPT

## 2022-11-17 PROCEDURE — 2580000003 HC RX 258: Performed by: PHYSICIAN ASSISTANT

## 2022-11-17 RX ORDER — LINEZOLID 600 MG/1
600 TABLET, FILM COATED ORAL EVERY 12 HOURS SCHEDULED
Status: DISCONTINUED | OUTPATIENT
Start: 2022-11-17 | End: 2022-11-18 | Stop reason: HOSPADM

## 2022-11-17 RX ORDER — INSULIN LISPRO 100 [IU]/ML
0-16 INJECTION, SOLUTION INTRAVENOUS; SUBCUTANEOUS
Status: DISCONTINUED | OUTPATIENT
Start: 2022-11-17 | End: 2022-11-18 | Stop reason: HOSPADM

## 2022-11-17 RX ORDER — INSULIN LISPRO 100 [IU]/ML
0-4 INJECTION, SOLUTION INTRAVENOUS; SUBCUTANEOUS NIGHTLY
Status: DISCONTINUED | OUTPATIENT
Start: 2022-11-17 | End: 2022-11-18 | Stop reason: HOSPADM

## 2022-11-17 RX ADMIN — LISINOPRIL 5 MG: 5 TABLET ORAL at 09:35

## 2022-11-17 RX ADMIN — ASPIRIN 81 MG: 81 TABLET, CHEWABLE ORAL at 09:35

## 2022-11-17 RX ADMIN — MORPHINE SULFATE 4 MG: 4 INJECTION, SOLUTION INTRAMUSCULAR; INTRAVENOUS at 12:33

## 2022-11-17 RX ADMIN — SODIUM CHLORIDE 3000 MG: 900 INJECTION INTRAVENOUS at 12:29

## 2022-11-17 RX ADMIN — ATORVASTATIN CALCIUM 20 MG: 20 TABLET, FILM COATED ORAL at 09:35

## 2022-11-17 RX ADMIN — SILVER NITRATE APPLICATORS 1 EACH: 25; 75 STICK TOPICAL at 13:36

## 2022-11-17 RX ADMIN — METFORMIN HYDROCHLORIDE 1000 MG: 1000 TABLET ORAL at 18:01

## 2022-11-17 RX ADMIN — HYDROCHLOROTHIAZIDE 25 MG: 25 TABLET ORAL at 09:35

## 2022-11-17 RX ADMIN — CEFEPIME 2000 MG: 2 INJECTION, POWDER, FOR SOLUTION INTRAVENOUS at 04:55

## 2022-11-17 RX ADMIN — METFORMIN HYDROCHLORIDE 1000 MG: 1000 TABLET ORAL at 09:35

## 2022-11-17 RX ADMIN — SODIUM CHLORIDE 3000 MG: 900 INJECTION INTRAVENOUS at 18:00

## 2022-11-17 RX ADMIN — METRONIDAZOLE 500 MG: 500 INJECTION, SOLUTION INTRAVENOUS at 06:01

## 2022-11-17 RX ADMIN — OXYCODONE HYDROCHLORIDE AND ACETAMINOPHEN 1 TABLET: 5; 325 TABLET ORAL at 09:35

## 2022-11-17 RX ADMIN — VANCOMYCIN HYDROCHLORIDE 1000 MG: 1 INJECTION, POWDER, LYOPHILIZED, FOR SOLUTION INTRAVENOUS at 02:49

## 2022-11-17 RX ADMIN — LINEZOLID 600 MG: 600 TABLET, FILM COATED ORAL at 21:01

## 2022-11-17 ASSESSMENT — PAIN SCALES - GENERAL
PAINLEVEL_OUTOF10: 6
PAINLEVEL_OUTOF10: 7

## 2022-11-17 ASSESSMENT — PAIN DESCRIPTION - LOCATION: LOCATION: GROIN;SCROTUM

## 2022-11-17 NOTE — PLAN OF CARE
Problem: Discharge Planning  Goal: Discharge to home or other facility with appropriate resources  Outcome: Progressing  Flowsheets (Taken 11/17/2022 1733)  Discharge to home or other facility with appropriate resources:   Identify barriers to discharge with patient and caregiver   Arrange for needed discharge resources and transportation as appropriate   Refer to discharge planning if patient needs post-hospital services based on physician order or complex needs related to functional status, cognitive ability or social support system     Problem: Pain  Goal: Verbalizes/displays adequate comfort level or baseline comfort level  Outcome: Progressing  Flowsheets (Taken 11/17/2022 1733)  Verbalizes/displays adequate comfort level or baseline comfort level:   Encourage patient to monitor pain and request assistance   Assess pain using appropriate pain scale   Administer analgesics based on type and severity of pain and evaluate response   Implement non-pharmacological measures as appropriate and evaluate response     Problem: ABCDS Injury Assessment  Goal: Absence of physical injury  Outcome: Progressing  Flowsheets (Taken 11/17/2022 1733)  Absence of Physical Injury: Implement safety measures based on patient assessment

## 2022-11-17 NOTE — PROGRESS NOTES
Orders received from Dr. Mayo Puentes for diet order, home cpap, and to resume home metformin dose. Patient states he can't have insulin because of his occupation. Orders received from Dr. Yesenia Collazo for percocet 5-325 q 6 hrs prn and morphine 4 mg iv q 6 hrs prn for breakthrough pain.

## 2022-11-17 NOTE — FLOWSHEET NOTE
11/17/22 1249   Treatment Team Notification   Reason for Communication Review case   Team Member Name Dr. Whitney Gomez  (Urology)   Treatment Team Role Consulting Provider   Method of Communication Secure Message   Notification Time 1250     Patient's incision continues to bleed during wound care assessment/consult. New order received for silver nitrate. Electronically signed by Ana Montesinos RN.

## 2022-11-17 NOTE — CONSULTS
[facility] -   Infectious diseases evaluation  admission date 11/16/2022    reason for consultation:   Scrotal and perineal abscess    Impression :   Current:  Scrotal and perineal abscess status post I&D 11/16/2022  Acute renal failure  History of hydradenitis suppurativa  Diabetes mellitus  Hypertension  Sleep apnea on CPAP at night  Umbilical hernia      Recommendations     Discontinue IV vancomycin, cefepime and Flagyl  IV Unasyn and p.o. Zyvox  Follow intraoperative cultures and adjust antibiotics as needed  Monitor renal function  Likely oral antibiotics on discharge    Infection Control Recommendations   [place] Precautions      Antimicrobial Stewardship Recommendations   Simplification of therapy  Targeted therapy      History of Present Illness:   Initial history:  Dora Whelan is a 65y.o.-year-old male with history of diabetes mellitusand presented to hospital with worsening hidradenitis suppurativa left testicular pain associated with swelling for several days. Symptoms moderate to severe, worse with movement, no alleviating factors. He denied fever or chills, denied nausea or vomiting, no other complaints  CT scan showed  Left scrotal abscess measuring 4.6 x 3.2 x 4.7 cm with extensive   surrounding cellulitis. Small adjacent abscess at the junction of the right   scrotum/perineum measuring 3 x 1.8 x 1 cm, with possible fistulous connection   with the overlying skin (series 2/images 222-226). Small superficial   subcutaneous abscess within the left inguinal region measuring 0.6 x 1.3 x   1.2 cm. The patient was taken to the OR 11/16/2022 for surgical debridement.   History of axillary abscesses drainage in the past.  Interval changes  11/17/2022   Creatinine increased to 1.36 today  Patient Vitals for the past 8 hrs:   BP Temp Temp src Pulse Resp SpO2 Weight   11/17/22 0932 114/69 98.2 °F (36.8 °C) Oral 74 18 91 % --   11/17/22 0636 (!) 108/59 97.5 °F (36.4 °C) Axillary 75 18 91 % (!) 318 lb 2 oz (144.3 kg)             I have personally reviewed the past medical history, past surgical history, medications, social history, and family history, and I haveupdated the database accordingly. Allergies:   Januvia [sitagliptin]     Review of Systems:     Review of Systems  As per history of present illness, other than above 12 system review was negative  Physical Examination :       Physical Exam  Constitutional:       General: He is not in acute distress. HENT:      Head: Normocephalic and atraumatic. Right Ear: External ear normal.      Left Ear: External ear normal.   Eyes:      General: No scleral icterus. Conjunctiva/sclera: Conjunctivae normal.   Cardiovascular:      Rate and Rhythm: Normal rate and regular rhythm. Heart sounds: No murmur heard. Pulmonary:      Effort: Pulmonary effort is normal. No respiratory distress. Abdominal:      General: There is no distension. Palpations: Abdomen is soft. Musculoskeletal:      Cervical back: Neck supple. No rigidity. Right lower leg: No edema. Left lower leg: No edema. Skin:     Comments: Surgical dressing was not removed. Neurological:      General: No focal deficit present. Mental Status: He is alert and oriented to person, place, and time. Psychiatric:         Mood and Affect: Mood normal.         Behavior: Behavior normal.       Past Medical History:     Past Medical History:   Diagnosis Date    Anesthesia     NEVER HAD, FEARFUL HE'LL WAKE UP DISORIENTED & HURT SOMEONE. Diabetes mellitus ([address]     Hypertension     Sleep apnea     CPAP MACHINE AT NIGHT.     Umbilical hernia     Wears dentures     UPPER        Past Surgical  History:     Past Surgical History:   Procedure Laterality Date    SCROTAL SURGERY N/A 11/16/2022    EXPLORATION OF SCROTUM, PERINEUM, LEFT INGUINAL REGION performed by Kaci Garcia MD at [address] [place] [address]  11/16/2022 INCISION AND DRAINAGE WITH WASHOUT performed by Aysha Morel MD at [address] [facility]  5/24/16       Medications:      insulin lispro  0-16 Units SubCUTAneous TID WC    insulin lispro  0-4 Units SubCUTAneous Nightly    vancomycin (VANCOCIN) intermittent dosing (placeholder)   Other RX Placeholder    vancomycin  1,000 mg IntraVENous Q12H    sodium chloride flush  5-40 mL IntraVENous 2 times per day    aspirin  81 mg Oral Daily    atorvastatin  20 mg Oral Daily    hydroCHLOROthiazide  25 mg Oral Daily    lisinopril  5 mg Oral Daily    cefepime  2,000 mg IntraVENous Q12H    metroNIDAZOLE  500 mg IntraVENous Q8H    sodium chloride flush  5-40 mL IntraVENous 2 times per day    metFORMIN  1,000 mg Oral BID WC       Social History:     Social History     Socioeconomic History    Marital status: Single     Spouse name: Not on file    Number of children: Not on file    Years of education: Not on file    Highest education level: Not on file   Occupational History    Not on file   Tobacco Use    Smoking status: Every Day     Packs/day: 3.00     Years: 15.00     Pack years: 45.00     Types: Cigarettes    Smokeless tobacco: Never   Substance and Sexual Activity    Alcohol use: No     Alcohol/week: 0.0 standard drinks     Comment: occasionally    Drug use: No    Sexual activity: Not Currently   Other Topics Concern    Not on file   Social History Narrative    Not on file     Social Determinants of Health     Financial Resource Strain: Low Risk     Difficulty of Paying Living Expenses: Not hard at all   Food Insecurity: No Food Insecurity    Worried About Running Out of Food in the Last Year: Never true    Ran Out of Food in the Last Year: Never true   Transportation Needs: Not on file   Physical Activity: Not on file   Stress: Not on file   Social Connections: Not on file   Intimate Partner Violence: Not on file   Housing Stability: Not on file       Family History:     Family History   Problem Relation Age of Onset    Diabetes Father     High Blood Pressure Father       Medical Decision Making:   I have independently reviewed/ordered the following labs:    CBC with Differential:   Recent Labs     11/16/22  1247 11/17/22  0517   WBC 13.8* 13.3*   HGB 14.6 13.0*   HCT 42.8 38.1*    223   LYMPHOPCT 14* 15*   MONOPCT 6 7     BMP:  Recent Labs     11/16/22  1247 11/17/22  0517   * 133*   K 4.8 4.8   CL 95* 100   CO2 23 24   BUN 17 20   CREATININE 1.11 1.36*     Hepatic Function Panel: No results for input(s): PROT, LABALBU, BILIDIR, IBILI, BILITOT, ALKPHOS, ALT, AST in the last 72 hours. No results for input(s): RPR in the last 72 hours. No results for input(s): HIV in the last 72 hours. No results for input(s): BC in the last 72 hours. Lab Results   Component Value Date/Time    CREATININE 1.36 11/17/2022 05:17 AM    GLUCOSE 251 11/17/2022 05:17 AM       Detailed results: Thank you for allowing us to participate in the care of this patient. Please call with questions. This note is created with the assistance of a speech recognition program.  While intending to generate adocument that actually reflects the content of the visit, the document can still have some errors including those of syntax and sound a like substitutions which may escape proof reading. It such instances, actual meaningcan be extrapolated by contextual diversion.     Tala Guzman MD  Office: [phone]  Perfect serve / office [phone]

## 2022-11-17 NOTE — CARE COORDINATION
CASE MANAGEMENT NOTE:    Admission Date:  11/16/2022 Ad Madison is a 65 y.o.  male    Admitted for : Abscess of scrotum [N49.2]  Cellulitis of scrotum [N49.2]  Testicular abscess [N45.4]    Met with:  Patient    PCP:  Ewa Masters                                Insurance:  MMO      Is patient alert and oriented at time of discussion:  Yes    Current Residence/ Living Arrangements:  independently at home             Current Services PTA:  No    Does patient go to outpatient dialysis: No  If yes, location and chair time: n/a  Who is their nephrologist? N/a    Is patient agreeable to VNS: No    Freedom of choice provided:  NA    List of [address] provided: No    VNS chosen:  No    DME:  none    Home Oxygen: No    Nebulizer: No    CPAP/BIPAP: Yes CPAP    Supplier: Pharmacy counter    Potential Assistance Needed: No    SNF needed: No    Freedom of choice and list provided: NA    Pharmacy:  [facility]       Is patient currently receiving oral anticoagulation therapy? No    Is the Patient an [facility] with Readmission Risk Score greater than 14%? No  If yes, pt needs a follow up appointment made within 7 days. Family Members/Caregivers that pt would like involved in their care:    Yes    If yes, list name here:  sister candice    Transportation Provider:  Family             Discharge Plan:  11/17/22 - MMO - Patient is from home alone, DME: CPAP machine from Pharmacy counter, VNS - denies, Still works full time, Drove himself to ED, Plan is to return home with no needs. Will follow . //pf                Electronically signed by: Tamiko Laurent RN on 11/17/2022 at 1:39 PM

## 2022-11-17 NOTE — PROGRESS NOTES
[facility]    PROGRESS NOTE             11/17/2022    8:02 AM    Name:   Carmen Garcia  MRN:     [id number]     Acct:      [de-identified]   Room:   2106/2106-01  IP Day:  1  Admit Date:  11/16/2022 12:21 PM    PCP:  ELDA Taylor CNP  Code Status:  Full Code    Subjective:     C/C:   Chief Complaint   Patient presents with    Abscess     Interval History Status: improved. Patient seen and examined at bedside today, postop day 1 status post perineal and scrotal exploration and I&D of scrotal abscess. Patient endorsing slight pain at the surgical site as expected. Denies fever/chills, nausea/vomiting, chest pain/shortness of breath. Denies having any abdominal pain. Urology and infectious disease following. Antibiotic regimen changed to IV Unasyn, per ID. Brief History:     The patient is a 65 y.o. Non- / non  male who presents withAbscess   and he is admitted to the hospital for the management of testicular abscess. Patient is a 60-year-old male past medical history significant for obesity,  type 2 diabetes, hypertension, CKD stage IIIa, MIGUEL on CPAP, hidradenitis suppurativa who presented to the ED on 11/16/2022 with chief complaint of left testicular pain, swelling, tenderness for the past several days. He denies fever/chills, nausea/vomiting, abdominal or pelvic pain. Patient endorses left testicular pain/tenderness and noticed increased swelling that prompted him to come to the ED. In ED, vitals were unremarkable. Labs were significant for hyponatremia and leukocytosis with white blood cell of 13.8. Lactic acid unremarkable 1.5. CT abdomen and pelvis remarkable for 3 pelvic soft tissue abscesses largest within left scrotum measuring 4.6 x 3.2 x 4.7 cm with extensive surrounding cellulitis. Blood cultures were obtained. He was given cefepime, Flagyl, vancomycin x1 in ED.   Urology was consulted. Urology Dr. Kim Kolb will take patient to the OR today at 5 PM.  Patient will be admitted to [facility] for management of scrotal cellulitis and abscess. Review of Systems:     Review of Systems   Constitutional:  Negative for chills and fever. Respiratory:  Negative for cough, choking, chest tightness and shortness of breath. Cardiovascular:  Negative for chest pain, palpitations and leg swelling. Gastrointestinal:  Negative for abdominal pain, diarrhea, nausea and vomiting. Genitourinary:  Positive for scrotal swelling and testicular pain. Negative for dysuria, flank pain and urgency. Musculoskeletal: Negative. Skin:  Positive for wound. Neurological: Negative. Psychiatric/Behavioral: Negative. Medications: Allergies:     Allergies   Allergen Reactions    Januvia [Sitagliptin] Other (See Comments)     Constipation       Current Meds:   Scheduled Meds:    vancomycin (VANCOCIN) intermittent dosing (placeholder)   Other RX Placeholder    vancomycin  1,000 mg IntraVENous Q12H    sodium chloride flush  5-40 mL IntraVENous 2 times per day    aspirin  81 mg Oral Daily    atorvastatin  20 mg Oral Daily    hydroCHLOROthiazide  25 mg Oral Daily    lisinopril  5 mg Oral Daily    insulin lispro  0-8 Units SubCUTAneous TID WC    insulin lispro  0-4 Units SubCUTAneous Nightly    cefepime  2,000 mg IntraVENous Q12H    metroNIDAZOLE  500 mg IntraVENous Q8H    sodium chloride flush  5-40 mL IntraVENous 2 times per day    metFORMIN  1,000 mg Oral BID WC     Continuous Infusions:    sodium chloride      dextrose      sodium chloride       PRN Meds: sodium chloride flush, sodium chloride flush, sodium chloride, ondansetron **OR** ondansetron, polyethylene glycol, acetaminophen **OR** acetaminophen, albuterol sulfate HFA, glucose, dextrose bolus **OR** dextrose bolus, glucagon (rDNA), dextrose, sodium chloride flush, sodium chloride, ondansetron, metoclopramide, diphenhydrAMINE, Support Exception - unselect if not a suspected or confirmed emergency medical condition->Emergency Medical Condition (MA) Reason for Exam: left testicular abscess, pain, erythema Relevant Medical/Surgical History: hernia repair FINDINGS: Lower Chest: Somewhat spiculated solid 1.2 cm left lower lobe nodule (series 2/image 10). Organs: Within normal limits. GI/Bowel: Mild colonic diverticulosis without diverticulitis. No obstruction. Normal appendix. Pelvis: Left scrotal abscess measuring 4.6 x 3.2 x 4.7 cm with extensive surrounding cellulitis. Small adjacent abscess at the junction of the right scrotum/perineum measuring 3 x 1.8 x 1 cm, with possible fistulous connection with the overlying skin (series 2/images 222-226). Small superficial subcutaneous abscess within the left inguinal region measuring 0.6 x 1.3 x 1.2 cm. Extensive subcutaneous edema and severe skin thickening throughout the scrotum/penis. No focal testicular abnormality identified by CT. Mild prostatomegaly. Peritoneum/Retroperitoneum:   Nonspecific inguinal adenopathy bilaterally. Bones/Soft Tissues: Bilateral L4 pars interarticularis defects with grade 2 anterolisthesis of L4 on L5 measuring 1.3 cm. Severe central canal stenosis at the L4-5 level noted. Ventral abdominal wall mesh hernia repair. 1.  Three pelvic soft tissue abscesses, largest within the left scrotum, as above. Extensive surrounding cellulitis. Surgical consultation recommended. 2.  Indeterminate spiculated left lower lobe nodule. Differential considerations include infectious, inflammatory or neoplastic etiologies. Follow-up CT chest recommended in 4-6 weeks to document stability following treatment. Alternatively, PET/CT scan may be useful as clinically warranted. 3.  Bilateral L4 pars interarticularis defects with grade 2 anterolisthesis of L4 on L5 and secondary severe central canal stenosis at this level. 4.  Additional nonemergent findings, as above. Physical Examination:        Physical Exam  Constitutional:       Appearance: Normal appearance. HENT:      Head: Normocephalic and atraumatic. Mouth/Throat:      Mouth: Mucous membranes are moist.      Pharynx: Oropharynx is clear. Eyes:      Extraocular Movements: Extraocular movements intact. Conjunctiva/sclera: Conjunctivae normal.   Cardiovascular:      Rate and Rhythm: Normal rate and regular rhythm. Heart sounds: No murmur heard. No gallop. Pulmonary:      Effort: Pulmonary effort is normal. No respiratory distress. Breath sounds: Normal breath sounds. No wheezing. Abdominal:      General: Bowel sounds are normal. There is no distension. Tenderness: There is no abdominal tenderness. Genitourinary:     Testes:         Right: Tenderness or swelling not present. Left: Tenderness and swelling present. Comments: Hidradenitis suppurativa scars and nodules are present bilateral groin, pubic/genitofemoral areas    Musculoskeletal:      Right lower leg: No edema. Left lower leg: No edema. Skin:     Findings: Erythema and rash present. Comments: Hidradenitis suppurativa scars/nodules in bilateral axillary regions   Neurological:      Mental Status: He is alert and oriented to person, place, and time.    Psychiatric:         Mood and Affect: Mood normal.         Behavior: Behavior normal.       Assessment:        Primary Problem  Testicular abscess    Active Hospital Problems    Diagnosis Date Noted    Testicular abscess [N45.4] 11/16/2022     Priority: Medium    Type 2 diabetes mellitus with diabetic polyneuropathy, without long-term current use of insulin ([facility] [E11.42] 06/20/2016    Essential hypertension [I10] 10/06/2014       Plan:        Left-sided scrotal cellulitis/abscess in a pt with a history of hidradenitis suppurativa -rule out Ana's gangrene  -Patient postop day 1 status post scrotal abscess I&D  -Scrotal abscess gram stain positive for gram-positive cocci gram-negative rods -we will follow-up with culture results  -CT abdomen and pelvis on admission: 3 pelvic soft tissue abscesses, largest within left scrotal measuring 4.6 x 3.2 x 4.7 cm with extensive surrounding cellulitis. -Blood cultures -no growth to date  -Patient received Flagyl, cefepime, Vanco x1 in ED  -IV Vanco, cefepime, and Flagyl discontinued, per ID  -Patient was started on Unasyn - day 1 , per ID   -Pain and nausea control per orders  -Urology following, will appreciate recommendations     Type 2 diabetes  -Last known A1c 9.8 on 6/20/2022  -High-dose sliding scale  -Hypoglycemic protocol  -POCT glucose checks 4 times daily     Asthma  Continue home med Olevia Shy -Proventil     Essential hypertension  -Continue home meds lisinopril 5 daily and hydrochlorothiazide 25 daily     MIGUEL on BiPAP  -Continue using BiPAP at night     CKD stage III  -Slight bump in creatinine from 1.11 on admission -1.36 today  -Avoid nephrotoxic drugs  -We will continue to monitor kidney function     Incidental finding of left lower lobe nodule   -CT chest abdomen and pelvis on admission: Indeterminate spiculated left lower lobe nodule.   Differential - Follow-up CT chest recommended in 4-6 weeks to document stability      DVT prophylaxis: None  Diet: N.p.o. surgery scheduled for today at 5  CODE STATUS: Full code  Disposition: Missy Moise MD  11/17/2022  8:02 AM

## 2022-11-17 NOTE — PROGRESS NOTES
Urology Progress Note    Subjective: doing well. No issues. Patient Vitals for the past 24 hrs:   BP Temp Temp src Pulse Resp SpO2 Weight   11/17/22 1200 113/67 98.8 °F (37.1 °C) Oral 79 18 90 % --   11/17/22 0932 114/69 98.2 °F (36.8 °C) Oral 74 18 91 % --   11/17/22 0636 (!) 108/59 97.5 °F (36.4 °C) Axillary 75 18 91 % (!) 318 lb 2 oz (144.3 kg)   11/17/22 0007 101/64 98.1 °F (36.7 °C) -- 72 16 92 % --   11/16/22 2041 -- -- -- -- 17 -- --   11/16/22 1930 136/69 98.1 °F (36.7 °C) Oral 81 18 93 % --   11/16/22 1900 126/72 -- -- 88 21 -- --   11/16/22 1850 (!) 109/57 98.2 °F (36.8 °C) Infrared 73 23 96 % --   11/16/22 1840 105/62 -- -- 79 11 95 % --   11/16/22 1832 (!) 108/53 -- -- 83 16 94 % --   11/16/22 1830 (!) 108/53 -- -- 89 (!) 5 96 % --   11/16/22 1820 (!) 84/57 -- -- 86 13 98 % --   11/16/22 1810 (!) 102/51 -- -- -- -- 98 % --   11/16/22 1807 (!) 102/51 98.6 °F (37 °C) Infrared 89 (!) 8 98 % --       Intake/Output Summary (Last 24 hours) at 11/17/2022 1635  Last data filed at 11/17/2022 1624  Gross per 24 hour   Intake 1100 ml   Output 910 ml   Net 190 ml       Recent Labs     11/16/22  1247 11/17/22  0517   WBC 13.8* 13.3*   HGB 14.6 13.0*   HCT 42.8 38.1*   MCV 83.3 83.6    223     Recent Labs     11/16/22  1247 11/17/22  0517   * 133*   K 4.8 4.8   CL 95* 100   CO2 23 24   BUN 17 20   CREATININE 1.11 1.36*       No results for input(s): COLORU, PHUR, LABCAST, WBCUA, RBCUA, MUCUS, TRICHOMONAS, YEAST, BACTERIA, CLARITYU, SPECGRAV, LEUKOCYTESUR, UROBILINOGEN, BILIRUBINUR, BLOODU in the last 72 hours. Invalid input(s): NITRATE, GLUCOSEUKETONESUAMORPHOUS    Additional Lab/culture results:    Physical Exam: wound packing in place. Wound healing nicely.      Interval Imaging Findings:    Impression:    Patient Active Problem List   Diagnosis    Essential hypertension    MIGUEL treated with BiPAP    Type 2 diabetes mellitus with diabetic polyneuropathy, without long-term current use of insulin (HCC)    Low HDL (under 40)    Morbid obesity with BMI of 40.0-44.9, adult (HCC)    Hypercoagulable state ([facility]    Hyperhomocysteinemia (HCC)    Wound cellulitis    Mixed hyperlipidemia    Polyneuropathy    Current smoker    Mild intermittent asthma without complication    Stage 3a chronic kidney disease (HCC)    Vitamin D deficiency    Arthritis of multiple sites    PVD (peripheral vascular disease) ([facility]    Dermatitis    Moderate asthma without complication    Testicular abscess       Plan:     Continue wet to dry dressing changes. ABx per ID. Will switch to PO ABx once culture is back.      Rehana Schaeffer MD  4:35 PM 11/17/2022

## 2022-11-17 NOTE — CONSULTS
[facility]  Consult Note       NAME:  Crescencio Lyman  MEDICAL RECORD NUMBER:  [id number]  AGE: 65 y.o. GENDER: male  : 1965  TODAY'S DATE:  2022    Subjective:      Kareen Nesbitt is a 65 y.o. male with inpatient referral to [facility] for:  Left scrotal wound      Wound Identification:  Wound Type:  surgical  Contributing Factors: edema, diabetes, poor glucose control, obesity, and smoking    Wound History: pt reported to ER on  with abscess, I&D done by urology  Current Wound Care Treatment:  saline wet to dry    Patient Goal of Care:  [x] Wound Healing  [] Odor Control  [] Palliative Care  [x] Pain Control   [] Other:         PAST MEDICAL HISTORY        Diagnosis Date    Anesthesia     NEVER HAD, FEARFUL HE'LL WAKE UP DISORIENTED & HURT SOMEONE. Diabetes mellitus ([address]     Hypertension     Sleep apnea     CPAP MACHINE AT NIGHT.     Umbilical hernia     Wears dentures     UPPER        PAST SURGICAL HISTORY    Past Surgical History:   Procedure Laterality Date    SCROTAL SURGERY N/A 2022    EXPLORATION OF SCROTUM, PERINEUM, LEFT INGUINAL REGION performed by Amrik Dutton MD at [address] [place] [address]  2022    INCISION AND DRAINAGE WITH WASHOUT performed by Amrik Dutton MD at [address] [place] [address]  16       FAMILY HISTORY    Family History   Problem Relation Age of Onset    Diabetes Father     High Blood Pressure Father        SOCIAL HISTORY    Social History     Tobacco Use    Smoking status: Every Day     Packs/day: 3.00     Years: 15.00     Pack years: 45.00     Types: Cigarettes    Smokeless tobacco: Never   Substance Use Topics    Alcohol use: No     Alcohol/week: 0.0 standard drinks     Comment: occasionally    Drug use: No         ALLERGIES    Allergies   Allergen Reactions    Januvia [Sitagliptin] Other (See Comments)     Constipation       HOME MEDICATIONS  Prior to Admission medications Medication Sig Start Date End Date Taking? Authorizing Provider   fluticasone-salmeterol (ADVAIR HFA) 115-21 MCG/ACT inhaler Inhale 2 puffs into the lungs in the morning and 2 puffs before bedtime.   Patient not taking: Reported on 11/16/2022 7/19/22   ELDA Ruiz CNP   lisinopril (PRINIVIL;ZESTRIL) 5 MG tablet Take 1 tablet by mouth once daily 7/8/22   ELDA Ruiz CNP   metFORMIN (GLUCOPHAGE) 1000 MG tablet TAKE 1 TABLET BY MOUTH TWICE DAILY WITH MEALS 7/8/22   ELDA Ruiz CNP   albuterol sulfate HFA (PROVENTIL;VENTOLIN;PROAIR) 108 (90 Base) MCG/ACT inhaler Inhale 2 puffs into the lungs every 6 hours as needed for Wheezing 6/20/22 9/18/22  ELDA Ruiz CNP   hydroCHLOROthiazide (HYDRODIURIL) 25 MG tablet Take 1 tablet by mouth daily 11/8/21   ELDA Ruiz CNP   atorvastatin (LIPITOR) 20 MG tablet Take 1 tablet by mouth once daily 11/8/21   ELDA Ruiz CNP   acetaminophen (TYLENOL) 500 MG tablet Take 1 tablet by mouth every 6 hours as needed for Pain (Take 1-2 tablets every 6 hours) 8/16/21 6/20/22  ELDA Ruiz CNP   aspirin 81 MG chewable tablet Take 1 tablet by mouth daily 6/11/21   ELDA Ruiz CNP   glimepiride (AMARYL) 4 MG tablet Take 1 tablet by mouth 2 times daily 6/11/21   ELDA Ruiz CNP   lisinopril (PRINIVIL;ZESTRIL) 5 MG tablet Take 1 tablet by mouth daily 6/11/21   ELDA Ruiz CNP   Multiple Vitamins-Minerals (THERAPEUTIC MULTIVITAMIN-MINERALS) tablet Take 1 tablet by mouth daily    Historical Provider, MD   b complex vitamins capsule Take 1 capsule by mouth daily    Historical Provider, MD       CURRENT MEDICATIONS:  Current Facility-Administered Medications   Medication Dose Route Frequency Provider Last Rate Last Admin    insulin lispro (HUMALOG) injection vial 0-16 Units  0-16 Units SubCUTAneous TID  Gil Springer MD        insulin lispro (HUMALOG) injection vial 0-4 Units  0-4 Units SubCUTAneous Nightly Pati Worthy MD        ampicillin-sulbactam (UNASYN) 3,000 mg in sodium chloride 0.9 % 100 mL IVPB (mini-bag)  3,000 mg IntraVENous Q6H Ji Ledbetter MD   Stopped at 11/17/22 1313    linezolid (ZYVOX) tablet 600 mg  600 mg Oral 2 times per day Ji Ledbetter MD        sodium chloride flush 0.9 % injection 10 mL  10 mL IntraVENous PRN Aura Olivier PA-C   10 mL at 11/16/22 1329    sodium chloride flush 0.9 % injection 5-40 mL  5-40 mL IntraVENous 2 times per day Pati Worthy MD        sodium chloride flush 0.9 % injection 5-40 mL  5-40 mL IntraVENous PRN Pati Worthy MD        0.9 % sodium chloride infusion   IntraVENous PRN Pati Worthy MD        ondansetron (ZOFRAN-ODT) disintegrating tablet 4 mg  4 mg Oral Q8H PRN Pati Worthy MD        Or    ondansetron (ZOFRAN) injection 4 mg  4 mg IntraVENous Q6H PRN Pati Worthy MD        polyethylene glycol (GLYCOLAX) packet 17 g  17 g Oral Daily PRN Pati Worthy MD        acetaminophen (TYLENOL) tablet 650 mg  650 mg Oral Q6H PRN Pati Worthy MD        Or    acetaminophen (TYLENOL) suppository 650 mg  650 mg Rectal Q6H PRN Pati Worthy MD        albuterol sulfate HFA (PROVENTIL;VENTOLIN;PROAIR) 108 (90 Base) MCG/ACT inhaler 2 puff  2 puff Inhalation Q6H PRN Pati Worthy MD        aspirin chewable tablet 81 mg  81 mg Oral Daily Pati Worthy MD   81 mg at 11/17/22 0935    atorvastatin (LIPITOR) tablet 20 mg  20 mg Oral Daily Pati Worthy MD   20 mg at 11/17/22 0935    hydroCHLOROthiazide (HYDRODIURIL) tablet 25 mg  25 mg Oral Daily Pati Worthy MD   25 mg at 11/17/22 0935    lisinopril (PRINIVIL;ZESTRIL) tablet 5 mg  5 mg Oral Daily Pati Worthy MD   5 mg at 11/17/22 0935    glucose chewable tablet 16 g  4 tablet Oral PRN Pati Worthy MD        dextrose bolus 10% 125 mL  125 mL IntraVENous PRN Pati Worthy MD        Or    dextrose bolus 10% 250 mL  250 mL IntraVENous PRN Gay Robins MD        glucagon (rDNA) injection 1 mg  1 mg SubCUTAneous PRN Gay Robins MD        dextrose 10 % infusion   IntraVENous Continuous PRN Gay Robins MD        sodium chloride flush 0.9 % injection 5-40 mL  5-40 mL IntraVENous 2 times per day Rubin Joy MD        sodium chloride flush 0.9 % injection 5-40 mL  5-40 mL IntraVENous PRN Rubin Joy MD        0.9 % sodium chloride infusion   IntraVENous PRN Rubin Joy MD        ondansetron (ZOFRAN) injection 4 mg  4 mg IntraVENous Once PRN Rubin Joy MD        metoclopramide (REGLAN) injection 10 mg  10 mg IntraVENous Once PRN Rubin Joy MD        diphenhydrAMINE (BENADRYL) injection 12.5 mg  12.5 mg IntraVENous Once PRN Rubin Joy MD        oxyCODONE-acetaminophen (PERCOCET) 5-325 MG per tablet 1 tablet  1 tablet Oral Q6H PRN Geeta Daniel MD   1 tablet at 11/17/22 0935    morphine sulfate (PF) injection 4 mg  4 mg IntraVENous Q6H PRN Geeta Daniel MD   4 mg at 11/17/22 1233    metFORMIN (GLUCOPHAGE) tablet 1,000 mg  1,000 mg Oral BID WC Brittany Lawrence MD   1,000 mg at 11/17/22 0935         Objective:      /67   Pulse 79   Temp 98.8 °F (37.1 °C) (Oral)   Resp 18   Ht 6' (1.829 m)   Wt (!) 318 lb 2 oz (144.3 kg)   SpO2 90%   BMI 43.15 kg/m²       LABS    CBC:   Lab Results   Component Value Date/Time    WBC 13.3 11/17/2022 05:17 AM    RBC 4.56 11/17/2022 05:17 AM    HGB 13.0 11/17/2022 05:17 AM     SED RATE: No results found for: SEDRATE    CMP:  Albumin:    Lab Results   Component Value Date/Time    LABALBU 4.2 07/31/2021 10:37 AM     PT/INR:  No results found for: PROTIME, INR  HgBA1c:    Lab Results   Component Value Date/Time    LABA1C 9.8 06/20/2022 08:13 AM     PTT: No components found for: LABPTT      Assessment:       Navneet Risk Score: Navneet Scale Score: 21    Patient Active Problem List   Diagnosis Code    Essential hypertension I10    MIGUEL treated with BiPAP G47.33    Type 2 diabetes mellitus with diabetic polyneuropathy, without long-term current use of insulin ([facility]) E11.42    Low HDL (under 40) E78.6    Morbid obesity with BMI of 40.0-44.9, adult ([facility]) E66.01, Z68.41    Hypercoagulable state ([facility]) D68.59    Hyperhomocysteinemia ([facility]) E72.11    Wound cellulitis L03.90    Mixed hyperlipidemia E78.2    Polyneuropathy G62.9    Current smoker F17.200    Mild intermittent asthma without complication G98.69    Stage 3a chronic kidney disease ([facility]) N18.31    Vitamin D deficiency E55.9    Arthritis of multiple sites M13.0    PVD (peripheral vascular disease) ([facility]) I73.9    Dermatitis L30.9    Moderate asthma without complication S27.246    Testicular abscess N45.4         Measurements:  Wound 11/17/22 Scrotum Left (Active)   Wound Image   11/17/22 1247   Wound Etiology Surgical 11/17/22 1247   Dressing Status Old drainage noted;New drainage noted;New dressing applied 11/17/22 1247   Wound Cleansed Cleansed with saline 11/17/22 1247   Dressing/Treatment Moist to dry;ABD 11/17/22 1247   Wound Length (cm) 6.5 cm 11/17/22 1247   Wound Width (cm) 2.5 cm 11/17/22 1247   Wound Depth (cm) 4.3 cm 11/17/22 1247   Wound Surface Area (cm^2) 16.25 cm^2 11/17/22 1247   Wound Volume (cm^3) 69.875 cm^3 11/17/22 1247   Wound Assessment Bleeding;Pink/red 11/17/22 1247   Drainage Amount Moderate 11/17/22 1247   Drainage Description Sanguinous 11/17/22 1247   Odor None 11/17/22 1247   Kavitha-wound Assessment Dry/flaky; Intact 11/17/22 1247   Margins Defined edges 11/17/22 1247   Number of days: 0         WOUND ASSESSMENT:   [facility] nurse consult for scrotal wound. Patient was admitted on 11/16 with a scrotal abscess. He had an I&D done by urology and wound was packed with saline wet-to-dry. Upon assessment of the wound today, wound is clean and red, no slough or devitalized tissue noted. Large amount of bleeding noted upon removal of packing from the 4 o'clock position near the wound edge.   Pressure was applied and unsuccessful. At one point when gauze was removed from wound blood was spurting from the area. Primary RN placed call to urology and order for silver nitrate obtained while writer held pressure on the wound. Application of silver nitrate was successful in slowing the bleeding to an ooze. Stopped application at that point due to pain and repacked wound with saline wet to dry dressing per orders from urology. Recommend that patient follow-up in the outpatient wound care center after discharge. Response to treatment:  Well tolerated by patient., With complaints of pain. Plan:     Plan of Care:     Scrotal wound: Cleanse with saline, pat dry. Apply saline wet-to-dry dressing to wound, cover with ABD, secure in place with mesh panties. Change twice daily and as needed if loose or soiled    Patient will likely need follow-up in the outpatient wound care center after discharge    [x] Turn and reposition every 2 hours while in bed. [x] Float heels off of bed with pillows under calves. [] Heel protective boots (heel medix boots) at all times while in bed.    [] Sacral foam dressing to sacrococcygeal area. Use skin barrier film prior to placement. Peel back dressing, inspect skin beneath, and re-secure every shift. Change every 3 days and as needed if loose or soiled. Discontinue sacral foam if repeatedly soiled by incontinence. [] Apply zinc oxide cream twice daily and as needed after incontinent episodes. [] Perform routine incontinence care with use of foam cleanser. [x] Use single layer moisture wicking underpad. [] Use comfort glide system and wedges to reposition patient. [x] Keep the head of the bed below 30 degrees unless contraindicated. [] Pressure reducing chair cushion while up to chair. Reposition every hour while in chair and limit chair time to 2 hour intervals. [x] Encourage good nutritional intake and fluids. Consult dietician if needed.     Specialty Bed Required : Yes   [] Low Air Loss   [x] Pressure Redistribution  [] Fluid Immersion  [] Bariatric  [] Total Pressure Relief  [] Other:     Current Diet: ADULT DIET;  Regular; 4 carb choices (60 gm/meal)  Dietician consult:  N/A    Discharge Plan:  Placement for patient upon discharge: independent living   Patient appropriate for Outpatient [address]: Yes    Patient/Caregiver Teaching:  Level of patientunderstanding able to:     [x] Indicates understanding       [] Needs reinforcement  [] Unsuccessful      [x] Verbal Understanding  [] Demonstrated understanding       [] No evidence of learning  [] Refused teaching         [] N/A       Electronically signed by Dena Preston RN on  11/17/2022 at 5:30 PM

## 2022-11-17 NOTE — ANESTHESIA POSTPROCEDURE EVALUATION
Department of Anesthesiology  Postprocedure Note    Patient: Noemí Vazquez  MRN: [id number]  YOB: 1965  Date of evaluation: 11/17/2022      Procedure Summary     Date: 11/16/22 Room / Location: [facility]: ABELARDO TABOR    Anesthesia Start: 5977 Anesthesia Stop: 3739    Procedures:       EXPLORATION OF SCROTUM, PERINEUM, LEFT INGUINAL REGION      INCISION AND DRAINAGE WITH WASHOUT Diagnosis:       Cellulitis of scrotum      (CELLULITIS OF SCROTUM)      (IP RM ER F)    Surgeons: Andrew Britton MD Responsible Provider: Nathan Mendes MD    Anesthesia Type: general ASA Status: 3          Anesthesia Type: No value filed. Hemanth Phase I: Hemanth Score: 10    Hemanth Phase II:        Anesthesia Post Evaluation    Comments: POD #1. Patient seen at bedside. No anesthesia complications reported.

## 2022-11-17 NOTE — DISCHARGE INSTR - COC
Continuity of Care Form    Patient Name: Marco A River   :  1965  MRN:  [id number]    Admit date:  2022  Discharge date:  ***    Code Status Order: Full Code   Advance Directives:     Admitting Physician:  Farooq Springer MD  PCP: Kristen Roberts, APRN - CNP    Discharging Nurse: [facility] Unit/Room#: 2106/2106-01  Discharging Unit Phone Number: ***    Emergency Contact:   Extended Emergency Contact Information  Primary Emergency Contact: Esmer Loera [address] Phone: [phone]  Work Phone: [phone]  Mobile Phone: [phone]  Relation: Brother/Sister    Past Surgical History:  Past Surgical History:   Procedure Laterality Date    SCROTAL SURGERY N/A 2022    EXPLORATION OF SCROTUM, PERINEUM, LEFT INGUINAL REGION performed by Soniya Mcdaniel MD at [address] [facility]  2022    INCISION AND DRAINAGE WITH WASHOUT performed by Soniya Mcdaniel MD at [address] [place] [address]  16       Immunization History:   Immunization History   Administered Date(s) Administered    COVID-19, PFIZER PURPLE top, DILUTE for use, (age 15 y+), 30mcg/0.3mL 2021, 2021    Pneumococcal Polysaccharide ([id number]) 2017       Active Problems:  Patient Active Problem List   Diagnosis Code    Essential hypertension I10    MIGUEL treated with BiPAP G47.33    Type 2 diabetes mellitus with diabetic polyneuropathy, without long-term current use of insulin ([facility]) E11.42    Low HDL (under 40) E78.6    Morbid obesity with BMI of 40.0-44.9, adult ([facility] E66.01, Z68.41    Hypercoagulable state ([facility] D68.59    Hyperhomocysteinemia (HCC) E72.11    Wound cellulitis L03.90    Mixed hyperlipidemia E78.2    Polyneuropathy G62.9    Current smoker F17.200    Mild intermittent asthma without complication K30.43    Stage 3a chronic kidney disease (HCC) N18.31    Vitamin D deficiency E55.9    Arthritis of multiple sites M13.0    PVD (peripheral vascular disease) ([facility]CC) I73.9    Dermatitis L30.9    Moderate asthma without complication T70.170    Testicular abscess N45.4       Isolation/Infection:   Isolation            No Isolation          Patient Infection Status       None to display            Nurse Assessment:  Last Vital Signs: /67   Pulse 79   Temp 98.8 °F (37.1 °C) (Oral)   Resp 18   Ht 6' (1.829 m)   Wt (!) 318 lb 2 oz (144.3 kg)   SpO2 90%   BMI 43.15 kg/m²     Last documented pain score (0-10 scale): Pain Level: 7  Last Weight:   Wt Readings from Last 1 Encounters:   22 (!) 318 lb 2 oz (144.3 kg)     Mental Status:  {IP PT MENTAL STATUS:}    IV Access:  { BEBETO IV ACCESS:298722758}    Nursing Mobility/ADLs:  Walking   {CHP DME UJBW:685563231}  Transfer  {CHP DME BBRI:818732135}  Bathing  {CHP DME KHQJ:572860679}  Dressing  {CHP DME GULD:165462763}  Toileting  {CHP DME FWW}  Feeding  {CHP DME ZUVM:564586681}  Med Admin  {P DME JDLJ:423345065}  Med Delivery   { BEBETO MED Delivery:392797718}    Wound Care Documentation and Therapy:  Wound 22 Scrotum Left (Active)   Wound Image   22 1247   Wound Etiology Surgical 22 1715   Dressing Status Clean;Dry; Intact 22 1715   Wound Cleansed Cleansed with saline 22 1247   Dressing/Treatment Moist to dry;ABD 22 1715   Wound Length (cm) 6.5 cm 22 1247   Wound Width (cm) 2.5 cm 22 1247   Wound Depth (cm) 4.3 cm 22 1247   Wound Surface Area (cm^2) 16.25 cm^2 22 1247   Wound Volume (cm^3) 69.875 cm^3 22 1247   Wound Assessment Other (Comment) 22 1715   Drainage Amount Moderate 22 1247   Drainage Description Sanguinous 22 1247   Odor None 22 1247   Kavitha-wound Assessment Other (Comment) 22 1715   Margins Defined edges 22 1247   Number of days: 0     Scrotal wound: Cleanse with saline, pat dry.   Apply saline wet-to-dry dressing to wound, cover with ABD, secure in place with mesh panties. Change twice daily and as needed if loose or soiled    Elimination:  Continence: Bowel: {YES / YR:44371}  Bladder: {YES / XI:40429}  Urinary Catheter: {Urinary Catheter:991205654}   Colostomy/Ileostomy/Ileal Conduit: {YES / NO:41295}       Date of Last BM: ***    Intake/Output Summary (Last 24 hours) at 2022 1748  Last data filed at 2022 1624  Gross per 24 hour   Intake 1100 ml   Output 910 ml   Net 190 ml     I/O last 3 completed shifts:   In: 1100 [I.V.:700; IV Piggyback:400]  Out: 10 [Blood:10]    Safety Concerns:     [employer] Safety Concerns:329173411}    Impairments/Disabilities:      [address] [employer] Impairments/Disabilities:205302255}    Nutrition Therapy:  Current Nutrition Therapy:   [employer] Diet List:040846608}    Routes of Feeding: {CHP DME Other Feedings:222472831}  Liquids: {Slp liquid thickness:85453}  Daily Fluid Restriction: {CHP DME Yes amt example:469976139}  Last Modified Barium Swallow with Video (Video Swallowing Test): {Done Not Done LOIV:427947005}    Treatments at the Time of Hospital Discharge:   Respiratory Treatments: ***  Oxygen Therapy:  {Therapy; copd oxygen:61540}  Ventilator:    {MH CC Vent FWTT:080395921}    Rehab Therapies: {THERAPEUTIC INTERVENTION:8661141807}  Weight Bearing Status/Restrictions: [facility] Weight Bearin}  Other Medical Equipment (for information only, NOT a DME order):  {EQUIPMENT:389073177}  Other Treatments: ***    Patient's personal belongings (please select all that are sent with patient):  {CHP DME Belongings:080964191}    RN SIGNATURE:  {Esignature:587472679}    CASE MANAGEMENT/SOCIAL WORK SECTION    Inpatient Status Date: ***    Readmission Risk Assessment Score:  Readmission Risk              Risk of Unplanned Readmission:  12           Discharging to Facility/ Agency   Name:   Address:  Phone:  Fax:    Dialysis Facility (if applicable)   Name:  Address:  Dialysis Schedule:  Phone:  Fax:    / signature: {Esignature:496282354}    PHYSICIAN SECTION    Prognosis: {Prognosis:8226805002}    Condition at Discharge: [address] Shari Leger Patient Condition:444590923}    Rehab Potential (if transferring to Rehab): {Prognosis:6188756648}    Recommended Labs or Other Treatments After Discharge: ***    Physician Certification: I certify the above information and transfer of Kareen Nesbitt  is necessary for the continuing treatment of the diagnosis listed and that he requires {Admit to Appropriate Level of Care:48777} for {GREATER/LESS:495167815} 30 days.      Update Admission H&P: {CHP DME Changes in SHVAA:803771953}    PHYSICIAN SIGNATURE:  Electronically signed by Kuldeep Parson MD on 11/18/22 at 12:00 PM EST

## 2022-11-17 NOTE — PROGRESS NOTES
11/17/22 1608   Encounter Summary   Encounter Overview/Reason  Spiritual/Emotional Needs   Service Provided For: Patient   Referral/Consult From: Rounding   Complexity of Encounter Low   Spiritual/Emotional needs   Type Spiritual Support   Assessment/Intervention/Outcome   Assessment Unable to assess   Intervention Prayer (assurance of)/[place]

## 2022-11-18 VITALS
HEIGHT: 72 IN | WEIGHT: 315 LBS | BODY MASS INDEX: 42.66 KG/M2 | RESPIRATION RATE: 18 BRPM | DIASTOLIC BLOOD PRESSURE: 66 MMHG | SYSTOLIC BLOOD PRESSURE: 113 MMHG | OXYGEN SATURATION: 94 % | HEART RATE: 76 BPM | TEMPERATURE: 98.7 F

## 2022-11-18 LAB
ABSOLUTE EOS #: 0.4 K/UL (ref 0–0.4)
ABSOLUTE LYMPH #: 1.9 K/UL (ref 1–4.8)
ABSOLUTE MONO #: 1 K/UL (ref 0.1–1.3)
ANION GAP SERPL CALCULATED.3IONS-SCNC: 9 MMOL/L (ref 9–17)
BASOPHILS # BLD: 1 % (ref 0–2)
BASOPHILS ABSOLUTE: 0.1 K/UL (ref 0–0.2)
BUN BLDV-MCNC: 21 MG/DL (ref 6–20)
CALCIUM SERPL-MCNC: 8.8 MG/DL (ref 8.6–10.4)
CHLORIDE BLD-SCNC: 96 MMOL/L (ref 98–107)
CO2: 25 MMOL/L (ref 20–31)
CREAT SERPL-MCNC: 1.21 MG/DL (ref 0.7–1.2)
EOSINOPHILS RELATIVE PERCENT: 4 % (ref 0–4)
GFR SERPL CREATININE-BSD FRML MDRD: >60 ML/MIN/1.73M2
GLUCOSE BLD-MCNC: 140 MG/DL (ref 75–110)
GLUCOSE BLD-MCNC: 224 MG/DL (ref 75–110)
GLUCOSE BLD-MCNC: 226 MG/DL (ref 75–110)
GLUCOSE BLD-MCNC: 227 MG/DL (ref 70–99)
HCT VFR BLD CALC: 39.4 % (ref 41–53)
HEMOGLOBIN: 13.1 G/DL (ref 13.5–17.5)
LYMPHOCYTES # BLD: 18 % (ref 24–44)
MCH RBC QN AUTO: 28 PG (ref 26–34)
MCHC RBC AUTO-ENTMCNC: 33.3 G/DL (ref 31–37)
MCV RBC AUTO: 84.1 FL (ref 80–100)
MONOCYTES # BLD: 9 % (ref 1–7)
PDW BLD-RTO: 14.4 % (ref 11.5–14.9)
PLATELET # BLD: 228 K/UL (ref 150–450)
PMV BLD AUTO: 8.2 FL (ref 6–12)
POTASSIUM SERPL-SCNC: 4.3 MMOL/L (ref 3.7–5.3)
RBC # BLD: 4.69 M/UL (ref 4.5–5.9)
SEG NEUTROPHILS: 68 % (ref 36–66)
SEGMENTED NEUTROPHILS ABSOLUTE COUNT: 7.3 K/UL (ref 1.3–9.1)
SODIUM BLD-SCNC: 130 MMOL/L (ref 135–144)
WBC # BLD: 10.6 K/UL (ref 3.5–11)

## 2022-11-18 PROCEDURE — 2580000003 HC RX 258: Performed by: INTERNAL MEDICINE

## 2022-11-18 PROCEDURE — 99232 SBSQ HOSP IP/OBS MODERATE 35: CPT | Performed by: INTERNAL MEDICINE

## 2022-11-18 PROCEDURE — 82947 ASSAY GLUCOSE BLOOD QUANT: CPT

## 2022-11-18 PROCEDURE — 99239 HOSP IP/OBS DSCHRG MGMT >30: CPT | Performed by: INTERNAL MEDICINE

## 2022-11-18 PROCEDURE — 36415 COLL VENOUS BLD VENIPUNCTURE: CPT

## 2022-11-18 PROCEDURE — 6370000000 HC RX 637 (ALT 250 FOR IP)

## 2022-11-18 PROCEDURE — 6370000000 HC RX 637 (ALT 250 FOR IP): Performed by: INTERNAL MEDICINE

## 2022-11-18 PROCEDURE — 80048 BASIC METABOLIC PNL TOTAL CA: CPT

## 2022-11-18 PROCEDURE — 85025 COMPLETE CBC W/AUTO DIFF WBC: CPT

## 2022-11-18 PROCEDURE — 6360000002 HC RX W HCPCS

## 2022-11-18 PROCEDURE — 6360000002 HC RX W HCPCS: Performed by: INTERNAL MEDICINE

## 2022-11-18 RX ORDER — ENOXAPARIN SODIUM 100 MG/ML
30 INJECTION SUBCUTANEOUS 2 TIMES DAILY
Status: DISCONTINUED | OUTPATIENT
Start: 2022-11-18 | End: 2022-11-18 | Stop reason: HOSPADM

## 2022-11-18 RX ORDER — INSULIN GLARGINE 100 [IU]/ML
10 INJECTION, SOLUTION SUBCUTANEOUS DAILY
Status: DISCONTINUED | OUTPATIENT
Start: 2022-11-18 | End: 2022-11-18 | Stop reason: HOSPADM

## 2022-11-18 RX ORDER — GLIPIZIDE 5 MG/1
10 TABLET ORAL
Status: DISCONTINUED | OUTPATIENT
Start: 2022-11-18 | End: 2022-11-18 | Stop reason: HOSPADM

## 2022-11-18 RX ORDER — OXYCODONE HYDROCHLORIDE AND ACETAMINOPHEN 5; 325 MG/1; MG/1
1 TABLET ORAL EVERY 8 HOURS PRN
Qty: 9 TABLET | Refills: 0 | Status: CANCELLED | OUTPATIENT
Start: 2022-11-18 | End: 2022-11-21

## 2022-11-18 RX ORDER — AMOXICILLIN AND CLAVULANATE POTASSIUM 875; 125 MG/1; MG/1
1 TABLET, FILM COATED ORAL 2 TIMES DAILY
Qty: 14 TABLET | Refills: 0 | Status: SHIPPED | OUTPATIENT
Start: 2022-11-18 | End: 2022-11-25

## 2022-11-18 RX ORDER — OXYCODONE HYDROCHLORIDE AND ACETAMINOPHEN 5; 325 MG/1; MG/1
1 TABLET ORAL EVERY 8 HOURS PRN
Qty: 9 TABLET | Refills: 0 | Status: SHIPPED | OUTPATIENT
Start: 2022-11-18 | End: 2022-11-21

## 2022-11-18 RX ORDER — GLIPIZIDE 10 MG/1
10 TABLET ORAL
Qty: 60 TABLET | Refills: 0 | Status: SHIPPED | OUTPATIENT
Start: 2022-11-19

## 2022-11-18 RX ORDER — LINEZOLID 600 MG/1
600 TABLET, FILM COATED ORAL 2 TIMES DAILY
Qty: 14 TABLET | Refills: 0 | Status: SHIPPED | OUTPATIENT
Start: 2022-11-18 | End: 2022-11-25

## 2022-11-18 RX ORDER — GLIMEPIRIDE 1 MG/1
4 TABLET ORAL
Status: DISCONTINUED | OUTPATIENT
Start: 2022-11-19 | End: 2022-11-18

## 2022-11-18 RX ADMIN — SODIUM CHLORIDE 3000 MG: 900 INJECTION INTRAVENOUS at 12:03

## 2022-11-18 RX ADMIN — ATORVASTATIN CALCIUM 20 MG: 20 TABLET, FILM COATED ORAL at 08:40

## 2022-11-18 RX ADMIN — LINEZOLID 600 MG: 600 TABLET, FILM COATED ORAL at 08:40

## 2022-11-18 RX ADMIN — SODIUM CHLORIDE 3000 MG: 900 INJECTION INTRAVENOUS at 01:00

## 2022-11-18 RX ADMIN — SODIUM CHLORIDE 3000 MG: 900 INJECTION INTRAVENOUS at 06:21

## 2022-11-18 RX ADMIN — HYDROCHLOROTHIAZIDE 25 MG: 25 TABLET ORAL at 08:40

## 2022-11-18 RX ADMIN — GLIPIZIDE 10 MG: 5 TABLET ORAL at 12:47

## 2022-11-18 RX ADMIN — LISINOPRIL 5 MG: 5 TABLET ORAL at 08:40

## 2022-11-18 RX ADMIN — ENOXAPARIN SODIUM 30 MG: 100 INJECTION SUBCUTANEOUS at 12:03

## 2022-11-18 RX ADMIN — ASPIRIN 81 MG: 81 TABLET, CHEWABLE ORAL at 08:40

## 2022-11-18 RX ADMIN — METFORMIN HYDROCHLORIDE 1000 MG: 1000 TABLET ORAL at 08:40

## 2022-11-18 NOTE — PROGRESS NOTES
[address]    PROGRESS NOTE             11/18/2022    8:00 AM    Name:   Carlee Serrato  MRN:     [id number]     Acct:      [de-identified]   Room:   2106/2106-01  IP Day:  2  Admit Date:  11/16/2022 12:21 PM    PCP:  ELDA Rivero CNP  Code Status:  Full Code    Subjective:     C/C:   Chief Complaint   Patient presents with    Abscess     Interval History Status: improved. Patient seen and examined at bedside today, postop day 2 status post perineal and scrotal exploration and I&D of scrotal abscess. Patient endorsing slight pain at the surgical site as expected. Denies fever/chills, nausea/vomiting, chest pain/shortness of breath. Denies having any abdominal pain. Urology and infectious disease following. Antibiotic regimen changed to IV Unasyn and p.o. Zyvox, per ID. Brief History:     The patient is a 65 y.o. Non- / non  male who presents withAbscess   and he is admitted to the hospital for the management of testicular abscess. Patient is a 71-year-old male past medical history significant for obesity,  type 2 diabetes, hypertension, CKD stage IIIa, MIGUEL on CPAP, hidradenitis suppurativa who presented to the ED on 11/16/2022 with chief complaint of left testicular pain, swelling, tenderness for the past several days. He denies fever/chills, nausea/vomiting, abdominal or pelvic pain. Patient endorses left testicular pain/tenderness and noticed increased swelling that prompted him to come to the ED. In ED, vitals were unremarkable. Labs were significant for hyponatremia and leukocytosis with white blood cell of 13.8. Lactic acid unremarkable 1.5. CT abdomen and pelvis remarkable for 3 pelvic soft tissue abscesses largest within left scrotum measuring 4.6 x 3.2 x 4.7 cm with extensive surrounding cellulitis. Blood cultures were obtained. He was given cefepime, Flagyl, vancomycin x1 in ED. Urology was consulted. Urology Dr. Freddie Villasenor will take patient to the OR today at 5 PM.  Patient will be admitted to [facility] for management of scrotal cellulitis and abscess. Review of Systems:     Review of Systems   Constitutional:  Negative for chills and fever. Respiratory:  Negative for cough, choking, chest tightness and shortness of breath. Cardiovascular:  Negative for chest pain, palpitations and leg swelling. Gastrointestinal:  Negative for abdominal pain, diarrhea, nausea and vomiting. Genitourinary:  Positive for scrotal swelling and testicular pain. Negative for dysuria, flank pain and urgency. Musculoskeletal: Negative. Skin:  Positive for wound. Neurological: Negative. Psychiatric/Behavioral: Negative. Medications: Allergies:     Allergies   Allergen Reactions    Januvia [Sitagliptin] Other (See Comments)     Constipation       Current Meds:   Scheduled Meds:    insulin lispro  0-16 Units SubCUTAneous TID WC    insulin lispro  0-4 Units SubCUTAneous Nightly    ampicillin-sulbactam  3,000 mg IntraVENous Q6H    linezolid  600 mg Oral 2 times per day    sodium chloride flush  5-40 mL IntraVENous 2 times per day    aspirin  81 mg Oral Daily    atorvastatin  20 mg Oral Daily    hydroCHLOROthiazide  25 mg Oral Daily    lisinopril  5 mg Oral Daily    sodium chloride flush  5-40 mL IntraVENous 2 times per day    metFORMIN  1,000 mg Oral BID WC     Continuous Infusions:    sodium chloride      dextrose      sodium chloride       PRN Meds: sodium chloride flush, sodium chloride flush, sodium chloride, ondansetron **OR** ondansetron, polyethylene glycol, acetaminophen **OR** acetaminophen, albuterol sulfate HFA, glucose, dextrose bolus **OR** dextrose bolus, glucagon (rDNA), dextrose, sodium chloride flush, sodium chloride, oxyCODONE-acetaminophen, morphine    Data:     Past Medical History:   has a past medical history of Anesthesia, Diabetes mellitus ([facility] [address] Hypertension, Sleep apnea, Umbilical hernia, and Wears dentures. Social History:   reports that he has been smoking. He has a 45.00 pack-year smoking history. He has never used smokeless tobacco. He reports that he does not drink alcohol and does not use drugs. Family History:   Family History   Problem Relation Age of Onset    Diabetes Father     High Blood Pressure Father        Vitals:  /75   Pulse 69   Temp 98.5 °F (36.9 °C) (Oral)   Resp 18   Ht 6' (1.829 m)   Wt (!) 317 lb 7.4 oz (144 kg)   SpO2 95%   BMI 43.06 kg/m²   Temp (24hrs), Av.7 °F (37.1 °C), Min:98.2 °F (36.8 °C), Max:99.2 °F (37.3 °C)    Recent Labs     22  1101 22  1622 22  1959 22  0632   POCGLU 220* 154* 199* 226*       I/O(24Hr): Intake/Output Summary (Last 24 hours) at 2022 0800  Last data filed at 2022 1811  Gross per 24 hour   Intake 100 ml   Output 2825 ml   Net -2725 ml       Labs:  [unfilled]    Lab Results   Component Value Date/Time    SPECIAL NOT REPORTED 2018 12:00 PM     Lab Results   Component Value Date/Time    CULTURE CULTURE IN PROGRESS 2022 05:41 PM       [unfilled]    Radiology:    CT ABDOMEN PELVIS W IV CONTRAST Additional Contrast? None    Result Date: 2022  EXAMINATION: CT OF THE ABDOMEN AND PELVIS WITH CONTRAST 2022 1:24 pm TECHNIQUE: CT of the abdomen and pelvis was performed with the administration of intravenous contrast. Multiplanar reformatted images are provided for review. Automated exposure control, iterative reconstruction, and/or weight based adjustment of the mA/kV was utilized to reduce the radiation dose to as low as reasonably achievable. COMPARISON: None.  HISTORY: ORDERING SYSTEM PROVIDED HISTORY: left testicular abscess, pain, erythema TECHNOLOGIST PROVIDED HISTORY: left testicular abscess, pain, erythema Decision Support Exception - unselect if not a suspected or confirmed emergency medical condition->Emergency Medical Condition (MA) Reason for Exam: left testicular abscess, pain, erythema Relevant Medical/Surgical History: hernia repair FINDINGS: Lower Chest: Somewhat spiculated solid 1.2 cm left lower lobe nodule (series 2/image 10). Organs: Within normal limits. GI/Bowel: Mild colonic diverticulosis without diverticulitis. No obstruction. Normal appendix. Pelvis: Left scrotal abscess measuring 4.6 x 3.2 x 4.7 cm with extensive surrounding cellulitis. Small adjacent abscess at the junction of the right scrotum/perineum measuring 3 x 1.8 x 1 cm, with possible fistulous connection with the overlying skin (series 2/images 222-226). Small superficial subcutaneous abscess within the left inguinal region measuring 0.6 x 1.3 x 1.2 cm. Extensive subcutaneous edema and severe skin thickening throughout the scrotum/penis. No focal testicular abnormality identified by CT. Mild prostatomegaly. Peritoneum/Retroperitoneum:   Nonspecific inguinal adenopathy bilaterally. Bones/Soft Tissues: Bilateral L4 pars interarticularis defects with grade 2 anterolisthesis of L4 on L5 measuring 1.3 cm. Severe central canal stenosis at the L4-5 level noted. Ventral abdominal wall mesh hernia repair. 1.  Three pelvic soft tissue abscesses, largest within the left scrotum, as above. Extensive surrounding cellulitis. Surgical consultation recommended. 2.  Indeterminate spiculated left lower lobe nodule. Differential considerations include infectious, inflammatory or neoplastic etiologies. Follow-up CT chest recommended in 4-6 weeks to document stability following treatment. Alternatively, PET/CT scan may be useful as clinically warranted. 3.  Bilateral L4 pars interarticularis defects with grade 2 anterolisthesis of L4 on L5 and secondary severe central canal stenosis at this level. 4.  Additional nonemergent findings, as above. Physical Examination:        Physical Exam  Constitutional:       Appearance: Normal appearance.    HENT:      Head: Normocephalic and atraumatic. Mouth/Throat:      Mouth: Mucous membranes are moist.      Pharynx: Oropharynx is clear. Eyes:      Extraocular Movements: Extraocular movements intact. Conjunctiva/sclera: Conjunctivae normal.   Cardiovascular:      Rate and Rhythm: Normal rate and regular rhythm. Heart sounds: No murmur heard. No gallop. Pulmonary:      Effort: Pulmonary effort is normal. No respiratory distress. Breath sounds: Normal breath sounds. No wheezing. Abdominal:      General: Bowel sounds are normal. There is no distension. Tenderness: There is no abdominal tenderness. Genitourinary:     Testes:         Right: Tenderness or swelling not present. Left: Tenderness and swelling present. Comments: Hidradenitis suppurativa scars and nodules are present bilateral groin, pubic/genitofemoral areas    Musculoskeletal:      Right lower leg: No edema. Left lower leg: No edema. Skin:     Findings: Erythema and rash present. Comments: Hidradenitis suppurativa scars/nodules in bilateral axillary regions   Neurological:      Mental Status: He is alert and oriented to person, place, and time.    Psychiatric:         Mood and Affect: Mood normal.         Behavior: Behavior normal.       Assessment:        Primary Problem  Testicular abscess    Active Hospital Problems    Diagnosis Date Noted    Testicular abscess [N45.4] 11/16/2022     Priority: Medium    Type 2 diabetes mellitus with diabetic polyneuropathy, without long-term current use [facility] [E11.42] 06/20/2016    Essential hypertension [I10] 10/06/2014       Plan:        Left-sided scrotal cellulitis/abscess in a pt with a history of hidradenitis suppurativa -rule out Ana's gangrene  -Patient postop day 2 status post scrotal abscess I&D  -Scrotal abscess gram stain positive for gram-positive cocci gram-negative rods -we will follow-up with culture results  -CT abdomen and pelvis on admission: 3 pelvic soft tissue abscesses, largest within left scrotal measuring 4.6 x 3.2 x 4.7 cm with extensive surrounding cellulitis. -Blood cultures -no growth to date  -Patient received Flagyl, cefepime, Vanco x1 in ED  -IV Vanco, cefepime, and Flagyl discontinued, per ID  -Patient was started on Unasyn and p.o. Zyvox- day 2 , per ID   -Pain and nausea control per orders  -Urology following, will appreciate recommendations    -Continue wet-to-dry dressing changes as outpatient   -Follow-up in 2 weeks with urology    Type 2 diabetes  -Last known A1c 9.8 on 6/20/2022  -Elevated blood glucose readings -227  -Patient was started on Lantus 10 units  -High-dose sliding scale  -Hypoglycemic protocol  -POCT glucose checks 4 times daily     Asthma  Continue home med Jeraline Buys -Proventil     Essential hypertension  -Continue home meds lisinopril 5 daily and hydrochlorothiazide 25 daily     MIGUEL on BiPAP  -Continue using BiPAP at night     CKD stage III  -Slight bump in creatinine from 1.11 on admission -1.21 today  -Avoid nephrotoxic drugs  -We will continue to monitor kidney function     Incidental finding of left lower lobe nodule   -CT chest abdomen and pelvis on admission: Indeterminate spiculated left lower lobe nodule. Differential - Follow-up CT chest recommended in 4-6 weeks to document stability      DVT prophylaxis: Lovenox 30 twice daily  Diet: N.p.o. surgery scheduled for today at 5  CODE STATUS: Full code  Disposition: Jeri Brenner MD  11/18/2022  8:00 AM      Attending Physician Statement  I have discussed the care of [address] and I have examined the patient myselft and taken ros and hpi , including pertinent history and exam findings,  with the resident. I have reviewed the key elements of all parts of the encounter with the resident. I agree with the assessment, plan and orders as documented by the resident.     Patient, clinically doing better  Using his CPAP  Hyperglycemia  Adding metformin  Once antibiotic are finalized, will work on discharge  Will need wound care  Electronically signed by Ricardo Walton MD

## 2022-11-18 NOTE — FLOWSHEET NOTE
11/18/22 1755   Treatment Team Notification   Reason for Communication Review case   Team Member Name Medicine Residents   Treatment Team Role Resident   Method of Communication Call   Notification Time [phone]     RN called Residents to inquire about PRN pain medication for dressing changes. New medication sent to Pharmacy for pick-up and instructions to be included in AVS.      Electronically signed by Shalonda Stover RN.

## 2022-11-18 NOTE — CARE COORDINATION
ONGOING DISCHARGE PLAN:    Patient is alert and oriented x4. Spoke with patient regarding discharge plan and patient confirms that plan is still home with no needs. Pod #2  Oral augmentin and zyvox through 11/25 @ d/c  Wet/dry dressing changes    Will continue to follow for additional discharge needs.     Electronically signed by Meredith Mitchell RN on 11/18/2022 at 3:26 PM

## 2022-11-18 NOTE — PROGRESS NOTES
Patient is unable to take insulin with his current employment and has not been given insulin during this hospital stay. Patient's blood sugar has maintained 150-250 range. RN to communicate to Attending. Electronically signed by Elena Romero RN.

## 2022-11-18 NOTE — DISCHARGE SUMMARY
[address]    Discharge Summary     Patient ID: Sheeba Carey  :  1965   MRN: [id number]     ACCOUNT:  [de-identified]   Patient's PCP: ELDA Carter CNP  Admit Date: 2022   Discharge Date: 2022   Length of Stay: 2  Code Status:  Full Code  Admitting Physician: Klhoe Mcmahon MD  Discharge Physician: Hilda Briggs MD     Active Discharge Diagnoses:       Primary Problem  Testicular abscess      Hospital Problems  Active Hospital Problems    Diagnosis Date Noted    Testicular abscess [N45.4] 2022     Priority: Medium    Type 2 diabetes mellitus with diabetic polyneuropathy, without long-term current use of insulin ([facility] [address] [E11.42] 2016    Essential hypertension [I10] 10/06/2014       Admission Condition:  fair     Discharged Condition: good    Hospital Stay:       Hospital Course:  Sheeba Carey is a 65 y.o. male who was admitted for the management of   Testicular abscess , presented to ER with left-sided testicular pain, swelling/tenderness. Patient is a 60-year-old male past medical history significant for obesity,  type 2 diabetes, hypertension, CKD stage IIIa, MIGUEL on CPAP, hidradenitis suppurativa who presented to the ED on 2022 with chief complaint of left testicular pain, swelling, tenderness for the past several days. He denied fever/chills, nausea/vomiting, abdominal or pelvic pain. In ED, vitals were unremarkable. Labs were significant for hyponatremia and leukocytosis with white blood cell of 13.8. Lactic acid unremarkable 1.5. CT abdomen and pelvis remarkable for 3 pelvic soft tissue abscesses largest within left scrotum measuring 4.6 x 3.2 x 4.7 cm with extensive surrounding cellulitis. Blood cultures were obtained. He was given cefepime, Flagyl, vancomycin x1 in ED. Urology was consulted.   He was taken to the OR the same day for perineal and scrotal exploration and I&D of scrotal abscess by urology. Cultures were obtained in OR. Patient was started  on IV Unasyn and p.o. Zyvox, per ID. Scrotal abscess cultures came back positive for streptococci, beta-hemolytic group B and C. Patient was discharged to home on postop day 2 with Augmentin 875 twice daily for 7 days and Zyvox 600 twice daily for 7 days. Patient will need to follow-up with Dr. Ramirez Real up at wound clinic in 1 week and urology in 2 weeks. Patient's initial CT chest abdomen and pelvis on admission was remarkable for intermediate risk spiculated left lower lobe nodule which needs to be followed up with CT chest in 4 to 6 weeks. During the course of hospitalization patient's blood sugars were running high he refused insulin and was started on his home med/metformin - also glipizide was added to his medication regimen. He will need to follow-up with primary care physician for better management of his diabetes and CT chest in 4 to 6 weeks.          Significant therapeutic interventions: I&D, IV antibiotics    Significant Diagnostic Studies:   Labs / Micro:  CBC:   Lab Results   Component Value Date/Time    WBC 10.6 11/18/2022 06:17 AM    RBC 4.69 11/18/2022 06:17 AM    HGB 13.1 11/18/2022 06:17 AM    HCT 39.4 11/18/2022 06:17 AM    MCV 84.1 11/18/2022 06:17 AM    MCH 28.0 11/18/2022 06:17 AM    MCHC 33.3 11/18/2022 06:17 AM    RDW 14.4 11/18/2022 06:17 AM     11/18/2022 06:17 AM     BMP:    Lab Results   Component Value Date/Time    GLUCOSE 227 11/18/2022 06:17 AM     11/18/2022 06:17 AM    K 4.3 11/18/2022 06:17 AM    CL 96 11/18/2022 06:17 AM    CO2 25 11/18/2022 06:17 AM    ANIONGAP 9 11/18/2022 06:17 AM    BUN 21 11/18/2022 06:17 AM    CREATININE 1.21 11/18/2022 06:17 AM    BUNCRER NOT REPORTED 07/31/2021 10:37 AM    CALCIUM 8.8 11/18/2022 06:17 AM    LABGLOM >60 11/18/2022 06:17 AM    GFRAA 60 07/31/2021 10:37 AM    GFR      07/31/2021 10:37 AM    GFR NOT REPORTED 07/31/2021 10:37 AM Radiology:  CT ABDOMEN PELVIS W IV CONTRAST Additional Contrast? None    Result Date: 11/16/2022  EXAMINATION: CT OF THE ABDOMEN AND PELVIS WITH CONTRAST 11/16/2022 1:24 pm TECHNIQUE: CT of the abdomen and pelvis was performed with the administration of intravenous contrast. Multiplanar reformatted images are provided for review. Automated exposure control, iterative reconstruction, and/or weight based adjustment of the mA/kV was utilized to reduce the radiation dose to as low as reasonably achievable. COMPARISON: None. HISTORY: ORDERING SYSTEM PROVIDED HISTORY: left testicular abscess, pain, erythema TECHNOLOGIST PROVIDED HISTORY: left testicular abscess, pain, erythema Decision Support Exception - unselect if not a suspected or confirmed emergency medical condition->Emergency Medical Condition (MA) Reason for Exam: left testicular abscess, pain, erythema Relevant Medical/Surgical History: hernia repair FINDINGS: Lower Chest: Somewhat spiculated solid 1.2 cm left lower lobe nodule (series 2/image 10). Organs: Within normal limits. GI/Bowel: Mild colonic diverticulosis without diverticulitis. No obstruction. Normal appendix. Pelvis: Left scrotal abscess measuring 4.6 x 3.2 x 4.7 cm with extensive surrounding cellulitis. Small adjacent abscess at the junction of the right scrotum/perineum measuring 3 x 1.8 x 1 cm, with possible fistulous connection with the overlying skin (series 2/images 222-226). Small superficial subcutaneous abscess within the left inguinal region measuring 0.6 x 1.3 x 1.2 cm. Extensive subcutaneous edema and severe skin thickening throughout the scrotum/penis. No focal testicular abnormality identified by CT. Mild prostatomegaly. Peritoneum/Retroperitoneum:   Nonspecific inguinal adenopathy bilaterally. Bones/Soft Tissues: Bilateral L4 pars interarticularis defects with grade 2 anterolisthesis of L4 on L5 measuring 1.3 cm. Severe central canal stenosis at the L4-5 level noted. Ventral abdominal wall mesh hernia repair. 1.  Three pelvic soft tissue abscesses, largest within the left scrotum, as above. Extensive surrounding cellulitis. Surgical consultation recommended. 2.  Indeterminate spiculated left lower lobe nodule. Differential considerations include infectious, inflammatory or neoplastic etiologies. Follow-up CT chest recommended in 4-6 weeks to document stability following treatment. Alternatively, PET/CT scan may be useful as clinically warranted. 3.  Bilateral L4 pars interarticularis defects with grade 2 anterolisthesis of L4 on L5 and secondary severe central canal stenosis at this level. 4.  Additional nonemergent findings, as above. Consultations:    Consults:     Final Specialist Recommendations/Findings:   PHARMACY TO DOSE VANCOMYCIN  IP CONSULT TO UROLOGY  IP CONSULT TO INTERNAL MEDICINE  IP CONSULT TO INFECTIOUS DISEASES  PHARMACY TO DOSE VANCOMYCIN      The patient was seen and examined on day of discharge and this discharge summary is in conjunction with any daily progress note from day of discharge.     Discharge plan:       Disposition: Home    Physician Follow Up:     Danielle Trujillo MD  [address] [place] [address]  [phone]    Schedule an appointment as soon as possible for a visit in 1 week(s)      [address] [place], [place] [address] [place] [address]  [phone]  Schedule an appointment as soon as possible for a visit in 1 week(s)  Follow-up with Dr. Tim Jackson -status post perineal and scrotal exploratory and scrotal abscess I&D    Zander Viera MD  [place] [address] [address]    Schedule an appointment as soon as possible for a visit in 2 week(s)  status post perineal and scrotal exploratory and scrotal abscess I&D    Kenya Ramirez, ELDA - CNP  [address] [facility]  [address] [facility] [address]  [phone]    Schedule an appointment as soon as possible for a visit in 1 week(s)  [facility] follow-up-patient was started on glipizide    Mabel Barton, APRN - CNP  [facility]  [address] [facility] [address]  [address] [place] [address]  [phone]             Requiring Further Evaluation/Follow Up POST HOSPITALIZATION/Incidental Findings: Left lower lobe nodule needs to be followed up with CT chest in 4 to 6 weeks    Diet: regular diet    Activity: As tolerated    Discharge Medications:      Medication List        START taking these medications      amoxicillin-clavulanate 875-125 MG per tablet  Commonly known as: AUGMENTIN  Take 1 tablet by mouth 2 times daily for 7 days     glipiZIDE 10 MG tablet  Commonly known as: GLUCOTROL  Take 1 tablet by mouth every morning (before breakfast)  Start taking on: November 19, 2022     linezolid 600 MG tablet  Commonly known as: ZYVOX  Take 1 tablet by mouth 2 times daily for 7 days            CONTINUE taking these medications      acetaminophen 500 MG tablet  Commonly known as: TYLENOL  Take 1 tablet by mouth every 6 hours as needed for Pain (Take 1-2 tablets every 6 hours)     albuterol sulfate  (90 Base) MCG/ACT inhaler  Commonly known as: PROVENTIL;VENTOLIN;PROAIR  Inhale 2 puffs into the lungs every 6 hours as needed for Wheezing     aspirin 81 MG chewable tablet  Take 1 tablet by mouth daily     atorvastatin 20 MG tablet  Commonly known as: LIPITOR  Take 1 tablet by mouth once daily     b complex vitamins capsule     hydroCHLOROthiazide 25 MG tablet  Commonly known as: HYDRODIURIL  Take 1 tablet by mouth daily     lisinopril 5 MG tablet  Commonly known as: PRINIVIL;ZESTRIL  Take 1 tablet by mouth once daily     metFORMIN 1000 MG tablet  Commonly known as: GLUCOPHAGE  TAKE 1 TABLET BY MOUTH TWICE DAILY WITH MEALS     therapeutic multivitamin-minerals tablet            ASK your doctor about these medications      fluticasone-salmeterol 115-21 MCG/ACT inhaler  Commonly known as: ADVAIR HFA  Inhale 2 puffs into the lungs in the morning and 2 puffs before bedtime. Where to Get Your Medications        These medications were sent to [facility] [place], [address] [place] [address]      Phone: [phone]   amoxicillin-clavulanate 875-125 MG per tablet  glipiZIDE 10 MG tablet  linezolid 600 MG tablet         Electronically signed by   Severiano Palma, MD  11/18/2022  2:01 PM      Thank you Dr. Emiliana Arita, ELDA - HARESH for the opportunity to be involved in this patient's care.

## 2022-11-18 NOTE — PROGRESS NOTES
[facility] -   Infectious diseases evaluation  admission date 11/16/2022    reason for consultation:   Scrotal and perineal abscess    Impression :   Current:  Scrotal and perineal abscess status post I&D 11/16/2022  Acute renal failure  History of hydradenitis suppurativa  Diabetes mellitus  Hypertension  Sleep apnea on CPAP at night  Umbilical hernia      Recommendations     IV Unasyn, may change to oral Augmentin 875 twice a day on discharge through 11/25/2022 and p.o. Zyvox 600 mg twice a day through 11/25/2022  Follow intraoperative cultures and adjust antibiotics as needed  Follow CBC and BUN/creatinine in 1 week  Follow-up with me at the wound care clinic on 11/2/22    Infection Control Recommendations   [place] Precautions      Antimicrobial Stewardship Recommendations   Simplification of therapy  Targeted therapy      History of Present Illness:   Initial history:  Lennox Aguilar is a 65y.o.-year-old male with history of diabetes mellitusand presented to hospital with worsening hidradenitis suppurativa left testicular pain associated with swelling for several days. Symptoms moderate to severe, worse with movement, no alleviating factors. He denied fever or chills, denied nausea or vomiting, no other complaints  CT scan showed  Left scrotal abscess measuring 4.6 x 3.2 x 4.7 cm with extensive   surrounding cellulitis. Small adjacent abscess at the junction of the right   scrotum/perineum measuring 3 x 1.8 x 1 cm, with possible fistulous connection   with the overlying skin (series 2/images [id number]). Small superficial   subcutaneous abscess within the left inguinal region measuring 0.6 x 1.3 x   1.2 cm. The patient was taken to the OR 11/16/2022 for surgical debridement.   History of axillary abscesses drainage in the past.  Interval changes  11/18/2022   He is complaining of postoperative pain, denied fever or chills, no nausea or vomiting, no new complaints. Surgical dressing was changed yesterday by wound care nurse, exam and photos noted. Patient Vitals for the past 8 hrs:   BP Temp Temp src Pulse Resp SpO2 Weight   11/18/22 1207 129/76 98.1 °F (36.7 °C) Oral 79 18 95 % --   11/18/22 0630 126/75 98.5 °F (36.9 °C) Oral 69 18 95 % --   11/18/22 0500 -- -- -- -- -- -- (!) 317 lb 7.4 oz (144 kg)               I have personally reviewed the past medical history, past surgical history, medications, social history, and family history, and I haveupdated the database accordingly. Allergies:   Januvia [sitagliptin]     Review of Systems:     Review of Systems  As per history of present illness, other than above 12 system review was negative  Physical Examination :       Physical Exam  Constitutional:       General: He is not in acute distress. HENT:      Head: Normocephalic and atraumatic. Right Ear: External ear normal.      Left Ear: External ear normal.   Eyes:      General: No scleral icterus. Conjunctiva/sclera: Conjunctivae normal.   Cardiovascular:      Rate and Rhythm: Normal rate and regular rhythm. Heart sounds: No murmur heard. Pulmonary:      Effort: Pulmonary effort is normal. No respiratory distress. Abdominal:      General: There is no distension. Palpations: Abdomen is soft. Musculoskeletal:      Cervical back: Neck supple. No rigidity. Right lower leg: No edema. Left lower leg: No edema. Skin:     Comments: Surgical dressing was not removed. Neurological:      General: No focal deficit present. Mental Status: He is alert and oriented to person, place, and time. Psychiatric:         Mood and Affect: Mood normal.         Behavior: Behavior normal.       Past Medical History:     Past Medical History:   Diagnosis Date    Anesthesia     NEVER HAD, FEARFUL HE'LL WAKE UP DISORIENTED & HURT SOMEONE. Diabetes mellitus ([address]     Hypertension     Sleep apnea     CPAP MACHINE AT NIGHT.     Umbilical hernia Wears dentures     UPPER        Past Surgical  History:     Past Surgical History:   Procedure Laterality Date    SCROTAL SURGERY N/A 11/16/2022    EXPLORATION OF SCROTUM, PERINEUM, LEFT INGUINAL REGION performed by Jo-Ann Lebron MD at [facility]  11/16/2022    INCISION AND DRAINAGE WITH WASHOUT performed by Jo-Ann Lebron MD at [address] [place] [address]  5/24/16       Medications:      insulin glargine  10 Units SubCUTAneous Daily    enoxaparin  30 mg SubCUTAneous BID    glipiZIDE  10 mg Oral QAM AC    insulin lispro  0-16 Units SubCUTAneous TID WC    insulin lispro  0-4 Units SubCUTAneous Nightly    ampicillin-sulbactam  3,000 mg IntraVENous Q6H    linezolid  600 mg Oral 2 times per day    sodium chloride flush  5-40 mL IntraVENous 2 times per day    aspirin  81 mg Oral Daily    atorvastatin  20 mg Oral Daily    hydroCHLOROthiazide  25 mg Oral Daily    lisinopril  5 mg Oral Daily    sodium chloride flush  5-40 mL IntraVENous 2 times per day    metFORMIN  1,000 mg Oral BID WC       Social History:     Social History     Socioeconomic History    Marital status: Single     Spouse name: Not on file    Number of children: Not on file    Years of education: Not on file    Highest education level: Not on file   Occupational History    Not on file   Tobacco Use    Smoking status: Every Day     Packs/day: 3.00     Years: 15.00     Pack years: 45.00     Types: Cigarettes    Smokeless tobacco: Never   Substance and Sexual Activity    Alcohol use: No     Alcohol/week: 0.0 standard drinks     Comment: occasionally    Drug use: No    Sexual activity: Not Currently   Other Topics Concern    Not on file   Social History Narrative    Not on file     Social Determinants of Health     Financial Resource Strain: Low Risk     Difficulty of Paying Living Expenses: Not hard at all   Food Insecurity: No Food Insecurity    Worried About Running Out of Food in the Last Year: Never true    Ran Out of Food in the Last Year: Never true   Transportation Needs: Not on file   Physical Activity: Not on file   Stress: Not on file   Social Connections: Not on file   Intimate Partner Violence: Not on file   Housing Stability: Not on file       Family History:     Family History   Problem Relation Age of Onset    Diabetes Father     High Blood Pressure Father       Medical Decision Making:   I have independently reviewed/ordered the following labs:    CBC with Differential:   Recent Labs     11/17/22  0517 11/18/22  0617   WBC 13.3* 10.6   HGB 13.0* 13.1*   HCT 38.1* 39.4*    228   LYMPHOPCT 15* 18*   MONOPCT 7 9*       BMP:  Recent Labs     11/17/22  0517 11/18/22  0617   * 130*   K 4.8 4.3    96*   CO2 24 25   BUN 20 21*   CREATININE 1.36* 1.21*       Hepatic Function Panel: No results for input(s): PROT, LABALBU, BILIDIR, IBILI, BILITOT, ALKPHOS, ALT, AST in the last 72 hours. No results for input(s): RPR in the last 72 hours. No results for input(s): HIV in the last 72 hours. No results for input(s): BC in the last 72 hours. Lab Results   Component Value Date/Time    CREATININE 1.21 11/18/2022 06:17 AM    GLUCOSE 227 11/18/2022 06:17 AM       Detailed results: Thank you for allowing us to participate in the care of this patient. Please call with questions. This note is created with the assistance of a speech recognition program.  While intending to generate adocument that actually reflects the content of the visit, the document can still have some errors including those of syntax and sound a like substitutions which may escape proof reading. It such instances, actual meaningcan be extrapolated by contextual diversion.     Graham Nj MD  Office: [phone]  Perfect serve / office [phone]

## 2022-11-18 NOTE — PROGRESS NOTES
Urology Progress Note    Subjective: no new issues. Sleeping soundly. Patient Vitals for the past 24 hrs:   BP Temp Temp src Pulse Resp SpO2 Weight   11/18/22 0630 126/75 98.5 °F (36.9 °C) Oral 69 18 95 % --   11/18/22 0500 -- -- -- -- -- -- (!) 317 lb 7.4 oz (144 kg)   11/18/22 0000 122/63 99.2 °F (37.3 °C) Oral 79 18 94 % --   11/17/22 1845 111/61 99 °F (37.2 °C) Oral 81 20 92 % --   11/17/22 1200 113/67 98.8 °F (37.1 °C) Oral 79 18 90 % --   11/17/22 0932 114/69 98.2 °F (36.8 °C) Oral 74 18 91 % --       Intake/Output Summary (Last 24 hours) at 11/18/2022 0824  Last data filed at 11/18/2022 0817  Gross per 24 hour   Intake 340 ml   Output 2825 ml   Net -2485 ml       Recent Labs     11/16/22  1247 11/17/22  0517   WBC 13.8* 13.3*   HGB 14.6 13.0*   HCT 42.8 38.1*   MCV 83.3 83.6    223     Recent Labs     11/16/22  1247 11/17/22  0517 11/18/22  0617   * 133* 130*   K 4.8 4.8 4.3   CL 95* 100 96*   CO2 23 24 25   BUN 17 20 21*   CREATININE 1.11 1.36* 1.21*       No results for input(s): COLORU, PHUR, LABCAST, WBCUA, RBCUA, MUCUS, TRICHOMONAS, YEAST, BACTERIA, CLARITYU, SPECGRAV, LEUKOCYTESUR, UROBILINOGEN, BILIRUBINUR, BLOODU in the last 72 hours. Invalid input(s): NITRATE, GLUCOSEUKETONESUAMORPHOUS    Additional Lab/culture results:    Physical Exam: Wound healing well.      Interval Imaging Findings:    Impression:    Patient Active Problem List   Diagnosis    Essential hypertension    MIGUEL treated with BiPAP    Type 2 diabetes mellitus with diabetic polyneuropathy, without long-term current use of insulin (HCC)    Low HDL (under 40)    Morbid obesity with BMI of 40.0-44.9, adult ([facility]    Hypercoagulable state ([facility]    Hyperhomocysteinemia (HCC)    Wound cellulitis    Mixed hyperlipidemia    Polyneuropathy    Current smoker    Mild intermittent asthma without complication    Stage 3a chronic kidney disease (HCC)    Vitamin D deficiency    Arthritis of multiple sites    PVD (peripheral vascular disease) ([facility] [address]    Dermatitis    Moderate asthma without complication    Testicular abscess       Plan:     65year-old male with scrotal abscess. Continue wet to dry as outpatient. Abx per ID. F/U in 2 weeks with urology.      Polina Francois MD  8:24 AM 11/18/2022

## 2022-11-19 NOTE — PROGRESS NOTES
Writer went over discharge instructions at the bedside with patient and charge RN; Charge RN did dressing change and wound care education with patient. Patient verbalized concern with performing his own dressing changes twice a day and is now interested in a VNS. Writer notified Charge RN, night shift Clin Lead RN and called NORAH Palencia CNP to discuss plan of care options with patient at the bedside. Patient very concerned with passing out while performing dressing change and expresses the wish to have assistance. Options of staying overnight to discuss setting up a VNS versus discharging home with sister discussed at the bedside; Patient has decided to discharge home at this time. Night shift Clin lead RN to provide patient's Facesheet and VNS interest to Case Management in the morning. Electronically signed by Cadence Ding RN.

## 2022-11-20 LAB
CULTURE: ABNORMAL
DIRECT EXAM: ABNORMAL
SPECIMEN DESCRIPTION: ABNORMAL

## 2022-11-21 ENCOUNTER — CARE COORDINATION (OUTPATIENT)
Dept: CASE MANAGEMENT | Age: 57
End: 2022-11-21

## 2022-11-21 ENCOUNTER — TELEPHONE (OUTPATIENT)
Dept: FAMILY MEDICINE CLINIC | Age: 57
End: 2022-11-21

## 2022-11-21 DIAGNOSIS — N45.4 TESTICULAR ABSCESS: Primary | ICD-10-CM

## 2022-11-21 DIAGNOSIS — E78.2 MIXED HYPERLIPIDEMIA: ICD-10-CM

## 2022-11-21 DIAGNOSIS — I10 ESSENTIAL HYPERTENSION: ICD-10-CM

## 2022-11-21 LAB
CULTURE: NORMAL
CULTURE: NORMAL
SPECIMEN DESCRIPTION: NORMAL
SPECIMEN DESCRIPTION: NORMAL

## 2022-11-21 RX ORDER — HYDROCHLOROTHIAZIDE 25 MG/1
TABLET ORAL
Qty: 90 TABLET | Refills: 0 | Status: SHIPPED | OUTPATIENT
Start: 2022-11-21

## 2022-11-21 RX ORDER — ATORVASTATIN CALCIUM 20 MG/1
TABLET, FILM COATED ORAL
Qty: 90 TABLET | Refills: 0 | Status: SHIPPED | OUTPATIENT
Start: 2022-11-21

## 2022-11-21 NOTE — TELEPHONE ENCOUNTER
Care Transitions Initial Follow Up Call    Outreach made within 2 business days of discharge: Yes    Patient: Nicole Boyle Patient : 1965   MRN: [id number]  Reason for Admission: There are no discharge diagnoses documented for the most recent discharge. Discharge Date: 22       Spoke with: [facility]    Discharge department/facility: [address] Interactive Patient Contact:  Was patient able to fill all prescriptions: Yes  Was patient instructed to bring all medications to the follow-up visit: Yes  Is patient taking all medications as directed in the discharge summary?  Yes  Does patient understand their discharge instructions: Yes  Does patient have questions or concerns that need addressed prior to 7-14 day follow up office visit: yes - PAPERWORK//FMLA FORMS    Scheduled appointment with PCP within 7-14 days    Follow Up  Future Appointments   Date Time Provider Luz Maria Spence   2022  9:45 AM Mary Polo MD [facility] [address]

## 2022-11-21 NOTE — CARE COORDINATION
[facility] Care Transitions Initial Follow Up Call    Call within 2 business days of discharge: Yes    Care Transition Nurse contacted the patient by telephone to perform post hospital discharge assessment. Verified name and  with patient as identifiers. Provided introduction to self, and explanation of the Care Transition Nurse role. Patient: Casper Garcia Patient : 1965   MRN: [id number]  Reason for Admission: Cellulitis of scrotum  Discharge Date: 22 RARS: Readmission Risk Score: 10.7      Last Discharge  Street       Date Complaint Diagnosis Description Type Department Provider    22 Abscess Cellulitis of scrotum . .. ED to Hosp-Admission (Discharged) (ADMITTED) JULIO Francis MD; Cassandra Mancilla. .. Was this an external facility discharge? No Discharge Facility: [facility]    Challenges to be reviewed by the provider   Additional needs identified to be addressed with provider: No  none               Method of communication with provider: none. Spoke with patient who said he is doing better today. He has minimal pain and states he has had no trouble with packing his wound. Patient denies any f/c, n/v, has some redness to surrounding area. He states his swelling has went down. He will follow up with PCP tomorrow and will see wound care clinic on . He has all medications at home. He doesn't check blood sugars, has no strips at home but states he has about 7 or 8 different glucometers. Patient is an over the road , has h/o hydradenitis and has issues with frequent boils/abscesses. He has already called and scheduled his follow up appointments. He is eating and drinking well and having no complicating issues. Denies any needs or concerns. Care Transition Nurse reviewed discharge instructions with patient who verbalized understanding.  The patient was given an opportunity to ask questions and does not have any further questions or concerns at this time. Were discharge instructions available to patient? Yes. Reviewed appropriate site of care based on symptoms and resources available to patient including: PCP. The patient agrees to contact the PCP office for questions related to their healthcare. Advance Care Planning:   Does patient have an Advance Directive: not on file; education provided. Medication reconciliation was performed with patient, who verbalizes understanding of administration of home medications. Medications reviewed, 1111F entered: yes    Was patient discharged with a pulse oximeter? no    Non-face-to-face services provided:  Scheduled appointment with PCP-11/22  Scheduled appointment with Specialist-12/2 Wound care clinic  Obtained and reviewed discharge summary and/or continuity of care documents    Offered patient enrollment in the Remote Patient Monitoring (RPM) program for in-home monitoring: Patient is not eligible for RPM program.    Care Transitions 24 Hour Call    Schedule Follow Up Appointment with PCP: Completed  Do you have a copy of your discharge instructions?: Yes  Do you have all of your prescriptions and are they filled?: Yes  Have you been contacted by a [facility] Pharmacist?: No  Have you scheduled your follow up appointment?: Yes  How are you going to get to your appointment?: Car - drive self  Do you have support at home?: Alone  Do you feel like you have everything you need to keep you well at home?: Yes  Are you an active caregiver in your home?: No  Care Transitions Interventions         Follow Up  Future Appointments   Date Time Provider Luz Maria Spence   11/22/2022 11:30 AM Sveta Peirre MD [id number]   12/2/2022  9:45 AM Celeste Myers MD [address] [place], [place] [address] [address] Transition Nurse provided contact information. Plan for follow-up call in 5-7 days based on severity of symptoms and risk factors.   Plan for next call: Check for s/s of infection, see what PCP said    Diana Landry RN

## 2022-11-22 ENCOUNTER — OFFICE VISIT (OUTPATIENT)
Dept: FAMILY MEDICINE CLINIC | Age: 57
End: 2022-11-22

## 2022-11-22 VITALS
BODY MASS INDEX: 40.63 KG/M2 | WEIGHT: 300 LBS | SYSTOLIC BLOOD PRESSURE: 126 MMHG | TEMPERATURE: 97.6 F | HEART RATE: 87 BPM | OXYGEN SATURATION: 98 % | DIASTOLIC BLOOD PRESSURE: 84 MMHG | HEIGHT: 72 IN

## 2022-11-22 DIAGNOSIS — Z28.21 INFLUENZA VACCINATION DECLINED: ICD-10-CM

## 2022-11-22 DIAGNOSIS — Z09 HOSPITAL DISCHARGE FOLLOW-UP: ICD-10-CM

## 2022-11-22 DIAGNOSIS — L73.2 HIDRADENITIS SUPPURATIVA: ICD-10-CM

## 2022-11-22 DIAGNOSIS — E11.42 TYPE 2 DIABETES MELLITUS WITH DIABETIC POLYNEUROPATHY, WITHOUT LONG-TERM CURRENT USE OF INSULIN (HCC): ICD-10-CM

## 2022-11-22 DIAGNOSIS — I12.9 BENIGN HYPERTENSION WITH CKD (CHRONIC KIDNEY DISEASE), STAGE II: ICD-10-CM

## 2022-11-22 DIAGNOSIS — J45.40 MODERATE PERSISTENT ASTHMA WITHOUT COMPLICATION: ICD-10-CM

## 2022-11-22 DIAGNOSIS — N18.2 BENIGN HYPERTENSION WITH CKD (CHRONIC KIDNEY DISEASE), STAGE II: ICD-10-CM

## 2022-11-22 DIAGNOSIS — Z11.59 ENCOUNTER FOR SCREENING FOR OTHER VIRAL DISEASES: ICD-10-CM

## 2022-11-22 DIAGNOSIS — E66.01 MORBID OBESITY WITH BMI OF 40.0-44.9, ADULT (HCC): ICD-10-CM

## 2022-11-22 DIAGNOSIS — S31.109A NONHEALING OPEN WOUND OF GROIN: Primary | ICD-10-CM

## 2022-11-22 DIAGNOSIS — E78.2 MIXED HYPERLIPIDEMIA: ICD-10-CM

## 2022-11-22 DIAGNOSIS — Z87.891 PERSONAL HISTORY OF SMOKING: ICD-10-CM

## 2022-11-22 DIAGNOSIS — R91.1 LUNG NODULE: ICD-10-CM

## 2022-11-22 LAB — HBA1C MFR BLD: 9.5 %

## 2022-11-22 RX ORDER — GLUCOSAMINE HCL/CHONDROITIN SU 500-400 MG
CAPSULE ORAL
Qty: 200 STRIP | Refills: 3 | Status: SHIPPED | OUTPATIENT
Start: 2022-11-22

## 2022-11-22 ASSESSMENT — ENCOUNTER SYMPTOMS
CHEST TIGHTNESS: 0
DIARRHEA: 0
NAUSEA: 0
VOMITING: 0
COUGH: 0
ABDOMINAL DISTENTION: 0
SHORTNESS OF BREATH: 0
ABDOMINAL PAIN: 0
WHEEZING: 0
CONSTIPATION: 0

## 2022-11-22 NOTE — PROGRESS NOTES
Post-Discharge Transitional Care Follow Up      Yulia Lyman   YOB: 1965    Date of Office Visit:  11/22/2022  Date of Hospital Admission: 11/16/22  Date of Hospital Discharge: 11/18/22  Readmission Risk Score (high >=14%.  Medium >=10%):Readmission Risk Score: 10.7      Care management risk score Rising risk (score 2-5) and Complex Care (Scores >=6): No Risk Score On File     Non face to face  following discharge, date last encounter closed (first attempt may have been earlier): 11/21/2022     Call initiated 2 business days of discharge: Yes     Nonhealing open wound of groin  Ongoing  -     WI DISCHARGE MEDS RECONCILED W/ CURRENT OUTPATIENT MED LIST  Forms for work completed, reevaluate in 8 weeks, unable to say if he can return back to work to full duty at this time   Patient wanted his forms for himself, he did not want them faxed  was told that he does need to follow-up with wound care only, not with Dr. Roseanne Leyva self packing with mesh twice a day  Strongly counseled to quit smoking  Need to improve diabetes control discussed    Hidradenitis suppurativa  Ongoing  Might need evaluation by dermatologist, will leave it up to ID specialist at wound care  -     Jerzy Corrigan 1452 W/ CURRENT OUTPATIENT MED LIST  -     CBC with Auto Differential; Future  Type 2 diabetes mellitus with diabetic polyneuropathy, without long-term current use of insulin ([facility] [address]  Improving but uncontrolled  -     POCT glycosylated hemoglobin (Hb A1C)    Lab Results   Component Value Date    LABA1C 9.5 11/22/2022    LABA1C 9.8 06/20/2022    LABA1C 8.6 08/16/2021       -     WI DISCHARGE MEDS RECONCILED W/ CURRENT OUTPATIENT MED LIST  -   restart  Semaglutide 3 MG TABS; Take 3 mg by mouth daily 1st step, Disp-30 tablet, R-0Print    Advised to get  all his glucometers with him when going to the pharmacy, advised the pharmacist to give him the test strips covered by the insurance which could match one of his glucometers  -     blood glucose monitor strips; Testing twice a day. Please dispense according to patients insurance., Disp-200 strip, R-3, Print    -     CBC with Auto Differential; Future  -     Comprehensive Metabolic Panel; Future  -     Magnesium; Future  -     TSH; Future  -     Vitamin B12 & Folate; Future    -advised home blood glucose testing twice daily  -daily feet exam, Foot care: advised to wash feet daily, pat dry and apply lotion at night, avoiding between toes. Need to look at feet daily and report to a physician any signs of inflammation or skin damage  -annual dilated eye exam  -Low carb, low fat diet, increase fruits and vegetables, and exercise 4-5 times a day 30-40 minutes a day discussed  -continue glipizide 10 Mg daily, metformin 1000 Mg twice a day  -continue Aspirin 81 Mg  -continue lisinopril ACEI and statin Lipitor    Benign hypertension with CKD (chronic kidney disease), stage II  Well controlled. Stable chronic kidney disease stage II, avoid NSAIDs  Continue current treatment. Hydrochlorothiazide 25 Mg, lisinopril 5 Mg daily  Will recheck labs. Discussed low salt diet and BP and pulse monitoring.    -     ND DISCHARGE MEDS RECONCILED W/ CURRENT OUTPATIENT MED LIST  -     CBC with Auto Differential; Future  -     Comprehensive Metabolic Panel; Future  -     Magnesium; Future  -     TSH;  Future  Moderate persistent asthma without complication  Stable  To restart albuterol 2 puffs every 6 hours, might need pulmonary function test, we will leave it up to the pulmonologist  Strongly advised smoking cessation  Will refer to pulmonologist for both asthma and lung nodule  -     ND DISCHARGE MEDS RECONCILED W/ CURRENT OUTPATIENT MED LIST  -     JULIAN - Sunita Skinner MD, Pulmonology, [place]  Mixed hyperlipidemia  Well-controlled, overdue for lipid panel  Continue Lipitor 20 Mg daily  Lab Results   Component Value Date    LDLCHOLESTEROL 37 07/31/2021       -     ND DISCHARGE MEDS RECONCILED W/ CURRENT OUTPATIENT MED LIST  -     Lipid Panel; Future  Personal history of smoking  Counseling given to quit smoking, start Chantix  -     WA DISCHARGE MEDS RECONCILED W/ CURRENT OUTPATIENT MED LIST  -  start   varenicline (CHANTIX STARTING MONTH JENNIE) 0.5 MG X 11 & 1 MG X 42 tablet; 0.5 mg po daily x 3 days, 0.5 mg BID x 4 days, then 1 mg BID thereafter . Call for refill, Disp-53 each, R-0Print  -     JULIAN - Lorrain Cabot, MD, Pulmonology, [place]  Lung nodule  New  Strongly counseled to quit smoking  -     WA DISCHARGE MEDS RECONCILED W/ CURRENT OUTPATIENT MED LIST  -     AFL - Lorrain Cabot, MD, Pulmonology, [place]  Encounter for screening for other viral diseases  -     [place] DISCHARGE MEDS RECONCILED W/ CURRENT OUTPATIENT MED LIST  -     Hepatitis B Surface Antibody; Future  Hospital discharge follow-up  -     [place] DISCHARGE MEDS RECONCILED W/ CURRENT OUTPATIENT MED LIST  Forms completed, unable to give them a date when to return back to work, will reevaluate in 8 weeks    Morbid obesity with BMI of 40-44.9 adult ([facility] [address]      [place] DISCHARGE MEDS RECONCILED W/ CURRENT OUTPATIENT MED LIST  Improving    Medical Decision Making: high complexity    Return in 7 weeks (on 1/10/2023). On this date 11/22/2022 I have spent 45 minutes reviewing previous notes, test results and face to face with the patient discussing the diagnosis and importance of compliance with the treatment plan as well as documenting on the day of the visit. Future Appointments   Date Time Provider Luz Maria Spence   12/2/2022  9:45 AM Mary Jane Norris MD [facility]   1/12/2023  8:00 AM Cecilio Simeon MD [facility]           Subjective:   HPI    Inpatient course: Discharge summary reviewed- see chart.     Interval history/Current status: Improved    Patient was admitted 11/16/2022 through 11/18/2022 at [facility] due to left testicular abscess, white blood cell at admission was increased 13.8, lactic acid was unremarkable 1.5. The CT abdomen and pelvis showed 3 pelvic soft tissue abscesses, largest within the left scrotum and measuring 4.7 cm x 4.6 cm x 3.2 cm, with significant surrounding cellulitis. Blood cultures were negative. She was treated with vancomycin in the emergency room, then cefepime, Flagyl. Urology consult, decided perineal and scrotal exploration and incision and drainage of scrotal abscess on 11/16/2022. Scrotal abscess cultures came back positive for streptococci's beta-hemolytic group B and C. Infectious disease consult, changed antibiotics to Unasyn, and discharged to home on p.o. Zyvox and Augmentin for 7 more days. Investigations in the hospital also showed an intermediate 3 spiculated left lower lobe nodule, which needs follow-up with CT chest in 4 to 6 weeks. During the admission he was also noticed to have high blood sugars, and he refused insulin per discharge summary, he was restarted on his home medications which was metformin, and added glipizide. Today, patient comes alone. Patient reports ongoing left scrotum and inguinal wound, with mesh, which he has been changing twice a day, he does have drainage, pain 1/10 at rest, but when changing the packing it hurts. Doesn't smell  Patient reports he is aware he was discharged on Zyvox and augmentin for 7 days, will  today, did not start them yet, despite being discharged 4 days ago. Patient reports onset of symptoms on 11/15/2022. He says he has never had similar symptoms before or any wounds in the groin before. Patient says he works as  and cannot sit unless in a certain position. 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     Patient reports he was told in the hospital that he might have hidradenitis suppurativa, he was not aware of this, he reports he has never had skin infections before, his last wound was apparently in 2018 per records reviewed by myself after the visit, [facility]. Patient has asthma and he is worried about the lung nodule, found on the CAT scan. Patient reports gurgling in the chest, only at nighttime  Patient reports he ran out of Albuterol , he says he only used it for 1 week when he first received it, in September. He denies cough, shortness of breath, chest pain or wheezing. Apparently we did order lung cancer screening for him but he has never completed , order was in June 20, 2022. Nicotine dependence. Smoker, counseling given to quit smoking. Ready to quit: Yes  Counseling given: Yes        Patient has known uncontrolled diabetes  He does admit numbness and tingling in his feet. Does not check Home Blood Glucose  Wants [place], took it before and he have his sugars  Has multiple machines, but no test strips    Taking Metformin 1000 mg BID restarted in the hospital, Glipizide 10 mg daily -recently started in the hospital  He denies signs or symptoms of hypoglycemia. Lab Results   Component Value Date    LABA1C 9.5 11/22/2022    LABA1C 9.8 06/20/2022    LABA1C 8.6 08/16/2021       Hypertension:    he  is not exercising and is not adherent to low salt diet. Cardiac symptoms fatigue. Patient denies chest pain, chest pressure/discomfort, claudication, dyspnea, exertional chest pressure/discomfort, irregular heart beat, lower extremity edema, near-syncope, orthopnea, palpitations, paroxysmal nocturnal dyspnea, syncope, and tachypnea. Cardiovascular risk factors: advanced age (older than 54 for men, 72 for women), diabetes mellitus, dyslipidemia, hypertension, male gender, obesity (BMI >= 30 kg/m2), sedentary lifestyle, and smoking/ tobacco exposure. Use of agents associated with hypertension: none. History of target organ damage: none. BP controlled. Macel Denver reports compliance with BP medications, and tolerates them well, denies side effects. BP Readings from Last 3 Encounters:   11/22/22 126/84   11/18/22 113/66   06/20/22 124/72        Pulse is Normal.    Pulse Readings from Last 3 Encounters:   11/22/22 87   11/18/22 76   06/20/22 86     Morbid obesity improving  Weight is improving  Wt Readings from Last 3 Encounters:   11/22/22 300 lb (136.1 kg)   11/18/22 (!) 317 lb 7.4 oz (144 kg)   06/20/22 (!) 325 lb 3.2 oz (147.5 kg)       He declines any immunizations despite counseling        Patient Active Problem List   Diagnosis    Essential hypertension    MIGUEL treated with BiPAP    Type 2 diabetes mellitus with diabetic polyneuropathy, without long-term current use of insulin (HCC)    Low HDL (under 40)    Morbid obesity with BMI of 40.0-44.9, adult ([address]    Hypercoagulable state ([address]    Hyperhomocysteinemia (HCC)    Wound cellulitis    Mixed hyperlipidemia    Polyneuropathy    Current smoker    Mild intermittent asthma without complication    Stage 3a chronic kidney disease (HCC)    Vitamin D deficiency    Arthritis of multiple sites    PVD (peripheral vascular disease) ([facility] [address]    Dermatitis    Moderate asthma without complication    Testicular abscess    Nonhealing open wound of groin    Hidradenitis suppurativa    Personal history of smoking    Lung nodule    Influenza vaccination declined       Medications listed as ordered at the time of discharge from hospital     Medication List            Accurate as of November 22, 2022 12:08 PM. If you have any questions, ask your nurse or doctor.                 CONTINUE taking these medications      acetaminophen 500 MG tablet  Commonly known as: TYLENOL  Take 1 tablet by mouth every 6 hours as needed for Pain (Take 1-2 tablets every 6 hours)     albuterol sulfate  (90 Base) MCG/ACT inhaler  Commonly known as: PROVENTIL;VENTOLIN;PROAIR  Inhale 2 puffs into the lungs every 6 hours as needed for Wheezing amoxicillin-clavulanate 875-125 MG per tablet  Commonly known as: AUGMENTIN  Take 1 tablet by mouth 2 times daily for 7 days     aspirin 81 MG chewable tablet  Take 1 tablet by mouth daily     atorvastatin 20 MG tablet  Commonly known as: LIPITOR  Take 1 tablet by mouth once daily     b complex vitamins capsule     fluticasone-salmeterol 115-21 MCG/ACT inhaler  Commonly known as: ADVAIR HFA  Inhale 2 puffs into the lungs in the morning and 2 puffs before bedtime.      glipiZIDE 10 MG tablet  Commonly known as: GLUCOTROL  Take 1 tablet by mouth every morning (before breakfast)     hydroCHLOROthiazide 25 MG tablet  Commonly known as: HYDRODIURIL  Take 1 tablet by mouth once daily     linezolid 600 MG tablet  Commonly known as: ZYVOX  Take 1 tablet by mouth 2 times daily for 7 days     lisinopril 5 MG tablet  Commonly known as: PRINIVIL;ZESTRIL  Take 1 tablet by mouth once daily     metFORMIN 1000 MG tablet  Commonly known as: GLUCOPHAGE  TAKE 1 TABLET BY MOUTH TWICE DAILY WITH MEALS     therapeutic multivitamin-minerals tablet               Medications marked \"taking\" at this time  Outpatient Medications Marked as Taking for the 11/22/22 encounter (Office Visit) with Maya Greenberg MD   Medication Sig Dispense Refill    hydroCHLOROthiazide (HYDRODIURIL) 25 MG tablet Take 1 tablet by mouth once daily 90 tablet 0    atorvastatin (LIPITOR) 20 MG tablet Take 1 tablet by mouth once daily 90 tablet 0    linezolid (ZYVOX) 600 MG tablet--will  today Take 1 tablet by mouth 2 times daily for 7 days 14 tablet 0    amoxicillin-clavulanate (AUGMENTIN) 875-125 MG per tablet-we will  today Take 1 tablet by mouth 2 times daily for 7 days 14 tablet 0    glipiZIDE (GLUCOTROL) 10 MG tablet Take 1 tablet by mouth every morning (before breakfast) 60 tablet 0    lisinopril (PRINIVIL;ZESTRIL yes) 5 MG tablet Take 1 tablet by mouth once daily 90 tablet 2    metFORMIN (GLUCOPHAGE) 1000 MG tablet TAKE 1 TABLET BY MOUTH TWICE DAILY WITH MEALS 180 tablet 2    aspirin 81 MG chewable tablet Take 1 tablet by mouth daily 90 tablet 5    Multiple Vitamins-Minerals (THERAPEUTIC MULTIVITAMIN-MINERALS) tablet Take 1 tablet by mouth daily      b complex vitamins capsule Take 1 capsule by mouth daily          Medications patient taking as of now reconciled against medications ordered at time of hospital discharge: Yes    Review of Systems   Constitutional:  Positive for fatigue. Negative for activity change, appetite change, chills, diaphoresis, fever and unexpected weight change. Respiratory:  Negative for cough, chest tightness, shortness of breath and wheezing. Cardiovascular:  Negative for chest pain, palpitations and leg swelling. Gastrointestinal:  Negative for abdominal distention, abdominal pain, constipation, diarrhea, nausea and vomiting. Endocrine: Negative for cold intolerance, heat intolerance, polydipsia, polyphagia and polyuria. Genitourinary:  Positive for scrotal swelling (left) and testicular pain (left, due to wound). Negative for difficulty urinating, dysuria, hematuria and urgency. Skin:  Positive for wound (left groin). Allergic/Immunologic: Positive for immunocompromised state (due to uncontrolled diabetes). Neurological:  Positive for numbness (feet). Objective:    /84   Pulse 87   Temp 97.6 °F (36.4 °C)   Ht 6' (1.829 m)   Wt 300 lb (136.1 kg)   SpO2 98%   BMI 40.69 kg/m²     Vital signs within normal limits except morbid obesity per BMI      Physical Exam  Vitals and nursing note reviewed. Constitutional:       General: He is not in acute distress. Appearance: Normal appearance. He is well-developed. He is obese. He is not diaphoretic. HENT:      Head: Normocephalic and atraumatic. Right Ear: External ear normal.      Left Ear: External ear normal.      Mouth/Throat:      Comments: I did not examine the mouth due to coronavirus pandemic and wearing masks.   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      Most recent labs reviewed consistent with mild anemia  Improved White blood cell count  Uncontrolled diabetes  Kidney disease stage II with GFR over 60, but increased BUN and creatinine  Hyponatremia likely related to uncontrolled hyperglycemia  Hyperlipidemia improved  High triglycerides  Otherwise labs within normal limits    Blood cultures x2 on 11/16/2022 negative    Culture wound showed Streptococcus beta-hemolytic group C MD, rare gram-negative rods    Lab Results   Component Value Date    WBC 10.6 11/18/2022    HGB 13.1 (L) 11/18/2022    HCT 39.4 (L) 11/18/2022    MCV 84.1 11/18/2022     11/18/2022       Lab Results   Component Value Date/Time     11/18/2022 06:17 AM    K 4.3 11/18/2022 06:17 AM    CL 96 11/18/2022 06:17 AM    CO2 25 11/18/2022 06:17 AM    BUN 21 11/18/2022 06:17 AM    CREATININE 1.21 11/18/2022 06:17 AM    GLUCOSE 227 11/18/2022 06:17 AM    CALCIUM 8.8 11/18/2022 06:17 AM        Lab Results   Component Value Date    ALT 27 07/31/2021    AST 15 07/31/2021    ALKPHOS 113 07/31/2021    BILITOT 0.33 07/31/2021       Lab Results   Component Value Date    TSH 1.01 07/31/2021       Lab Results   Component Value Date    CHOL 181 05/15/2017    CHOL 180 06/03/2015     Lab Results   Component Value Date    TRIG 186 (H) 05/15/2017    TRIG 154 (H) 06/03/2015     Lab Results   Component Value Date    HDL 34 (L) 07/31/2021    HDL 31 (L) 05/15/2017    HDL 29 (L) 06/03/2015     Lab Results   Component Value Date    LDLCHOLESTEROL 37 07/31/2021    LDLCHOLESTEROL 113 05/15/2017    LDLCHOLESTEROL 120 06/03/2015       Lab Results   Component Value Date    CHOLHDLRATIO 3.4 07/31/2021    CHOLHDLRATIO 5.8 (H) 05/15/2017    CHOLHDLRATIO 6.2 (H) 06/03/2015 Lab Results   Component Value Date    LABA1C 9.5 11/22/2022       Lab Results   Component Value Date    [id number] 599 07/31/2021       Lab Results   Component Value Date    FOLATE 16.8 07/31/2021       Electronically signed by Mehdi Reynolds MD on 11/29/22 at 8:07 AM EST        An electronic signature was used to authenticate this note.   --Mehdi Reynolds MD

## 2022-11-22 NOTE — PROGRESS NOTES
Visit Information    Have you changed or started any medications since your last visit including any over-the-counter medicines, vitamins, or herbal medicines? no   Are you having any side effects from any of your medications? -  no  Have you stopped taking any of your medications? Is so, why? -  no    Have you seen any other physician or provider since your last visit? No  Have you had any other diagnostic tests since your last visit? Yes - Records Obtained  Have you been seen in the emergency room and/or had an admission to a hospital since we last saw you? Yes - Records Obtained  Have you had your routine dental cleaning in the past 6 months? no    Have you activated your [employer] account? If not, what are your barriers?  Yes     Patient Care Team:  ELDA Ruiz CNP as PCP - General (Family Medicine)  ELDA Parkinson CNP as PCP - [facility]  Hilda Pierce RN as Care Transitions Nurse    Medical History Review  Past Medical, Family, and Social History reviewed and does contribute to the patient presenting condition    Health Maintenance   Topic Date Due    Hepatitis B vaccine (1 of 3 - Risk 3-dose series) Never done    DTaP/Tdap/Td vaccine (1 - Tdap) Never done    Shingles vaccine (1 of 2) Never done    Low dose CT lung screening  Never done    Diabetic retinal exam  05/15/2018    Diabetic foot exam  03/05/2020    COVID-19 Vaccine (3 - Booster for Pfizer series) 11/20/2021    Lipids  07/31/2022    Flu vaccine (1) Never done    A1C test (Diabetic or Prediabetic)  09/20/2022    Depression Screen  06/20/2023    Colorectal Cancer Screen  10/06/2024    Pneumococcal 0-64 years Vaccine  Completed    Hepatitis C screen  Completed    HIV screen  Completed    Hepatitis A vaccine  Aged Out    Hib vaccine  Aged Out    Meningococcal (ACWY) vaccine  Aged Out

## 2022-11-22 NOTE — RESULT ENCOUNTER NOTE
Please notify patient: POC A1c 9.5, improving diabetes but still uncontrolled, I did not notify him today at the appointment  We started the semaglutide today  Low carb, low fat diet, increase fruits and vegetables, and exercise 4-5 times a week 30-40 minutes a day, or walk 1-2 hours per day, or wear a pedometer and get at least 10,000 steps per day.     Future Appointments  12/2/2022  9:45 AM    Jose Young MD             [facility]  1/12/2023  8:00 AM    Pio Zhou MD     [facility]               [facility]

## 2022-11-28 NOTE — DISCHARGE INSTRUCTIONS
[address] and HYPERBARIC TREATMENT  CENTER      Visit  Discharge Instructions / Physician Orders  DATE: 12/2/2022     Home Care:NONE     SUPPLIES ORDERED THRU:         [employer]            DATE LAST SUPPLIED ordered 12/2/22     Wound Location:  Scrotum     Cleanse with: Liquid antibacterial soap and water, rinse well      Dressing Orders:  Primary dressing      Katie            Secondary dressing     Abd pad                      secure with           x 30days     Frequency:  Daily     Additional Orders: Increase protein to diet (meat, cheese, eggs, fish, peanut butter, nuts and beans)  Multivitamin daily    OFFLOADING [] YES  TYPE:                  [] NA    Weekly wound care visits until determined otherwise. Antibiotic therapy-wound care related YES [] NO [] NA[]    MY CHART []     Smart Device  []     HYPERBARIC TREATMENT-                TREATMENT #                          Your next appointment with the [address] [place] [address] is in 1 week                                                                                                   (Please note your next appointment above and if you are unable to keep, kindly give a 24 hour notice. Thank you.)  If more than 15 min late we cannot guarantee you will be seen due to clinician schedule  Per Policy, Excessive cancellation will call for dismissal from program.  If you experience any of the following, please call the [address] [place] [address] during business hours:  [phone]     * Increase in Pain  * Temperature over 101  * Increase in drainage from your wound  * Drainage with a foul odor  * Bleeding  * Increase in swelling  * Need for compression bandage changes due to slippage, breakthrough drainage. If you need medical attention outside of the business hours of the [address] [place] [address] please contact your PCP or go to the nearest emergency room.      The information contained in the After Visit Summary has been reviewed with me, the patient and/or responsible adult, by my health care provider(s). I had the opportunity to ask questions regarding this information.  I have elected to receive;      []After Visit Summary  [x]Comprehensive Discharge Instruction      Patient signature______________________________________Date:________   Electronically signed by Lieutenant Awa RN on 12/2/2022 at 10:13 AM  Electronically signed by Robson Arceo MD on 12/2/2022 at 10:29 AM

## 2022-11-29 ENCOUNTER — CARE COORDINATION (OUTPATIENT)
Dept: CASE MANAGEMENT | Age: 57
End: 2022-11-29

## 2022-11-29 PROBLEM — R91.1 LUNG NODULE: Status: ACTIVE | Noted: 2022-11-29

## 2022-11-29 PROBLEM — Z87.891 PERSONAL HISTORY OF SMOKING: Status: ACTIVE | Noted: 2022-11-29

## 2022-11-29 PROBLEM — Z28.21 INFLUENZA VACCINATION DECLINED: Status: ACTIVE | Noted: 2022-11-29

## 2022-11-29 NOTE — CARE COORDINATION
[facility] Care Transitions Follow Up Call    Care Transition Nurse contacted the patient by telephone to follow up after admission on 22. Verified name and  with patient as identifiers. Patient: Guadalupe Macias  Patient : 1965   MRN: [id number]  Reason for Admission: Cellulitis of Scrotum  Discharge Date: 22 RARS: Readmission Risk Score: 10.7      Needs to be reviewed by the provider   Additional needs identified to be addressed with provider: No  none             Method of communication with provider: none. Spoke to [facility] for transitions s\f/u call. Pt went to PCP on 22, was prescribed new medications Chantix and semaglutide. Stated he has not picked them up yet d/t cost, cannot afford new meds until he gets paid. Noted pt had not started atb per PCP note from , stated he is now taking Zyvox and Augmentin as well as glimepiride. Stated he has glucose test strips but has not figured out which glucometer they work with yet. Encouraged to do so today, start checking FBS bid and keep a log for follow up with PCP. Pt agreeable to call from RD to assist with carb control and meal plans. A1c 9.5, previous A1c was 9.8. Stated he is waiting for response from work about disability, is not sure how to navigate through process, is unsure when he can return to work as a  yet. Stated he is on limited budget and could use asst with affording medications. Will refer to .    Pt has wound clinic on 22, Pulmonology on  with Dr Lazara Helm, [facility] Pt has CPAP at night, not using albuterol as prescribed, stated it doesn't help. Scrotal wound is healing, continues daily packing. Addressed changes since last contact:  medications-not taking Chantix or semaglutide d/t cost  Discussed follow-up appointments.     Follow Up  Future Appointments   Date Time Provider Luz Maria Spence   2022  9:45 AM MD JULIO BundyD KARMA Ocampo   2023 8:00 AM Patricia Jones MD fp sc [phone] Transition Nurse reviewed discharge instructions with patient and discussed any barriers to care and/or understanding of plan of care after discharge. Discussed appropriate site of care based on symptoms and resources available to patient including: PCP  Specialist  When to call [address] The patient agrees to contact the PCP office for questions related to their healthcare. Patients top risk factors for readmission: medical condition-DM2, COPD  Interventions to address risk factors: Obtained and reviewed discharge summary and/or continuity of care documents and Establishment or re-establishment of referrals-BREANA ORTIZ    Offered patient enrollment in the Remote Patient Monitoring (RPM) program for in-home monitoring: Patient is not eligible for RPM program.     Care Transitions Subsequent and Final Call    Subsequent and Final Calls  Do you have any ongoing symptoms?: No  Have your medications changed?: Yes  Patient Reports: chantix, semaglutide  Do you have any questions related to your medications?: No  Do you currently have any active services?: No  Do you have any needs or concerns that I can assist you with?: Yes  Patient-reported Needs or Concerns: Needs asst with BREANA ORTIZ for med asst, disbility  Identified Barriers: Financial  Care Transitions Interventions  Other Interventions:             Care Transition Nurse provided contact information for future needs. Plan for follow-up call in 5-7 days based on severity of symptoms and risk factors. Plan for next call: symptom management-scrotal wound, sugars-did he start testing sugar bid? Completed atb?  Referral to BREANA and ANGEL  follow-up appointment-wound clinic, [facility] 12/7    Ludmila Grijalva RN

## 2022-11-29 NOTE — PROGRESS NOTES
Physician Progress Note      Jose Kay  [facility] #:                  [id number]  :                       1965  ADMIT DATE:       2022 12:21 PM  [address] DATE:        2022 8:24 PM  RESPONDING  PROVIDER #:        Graham Nj MD          QUERY TEXT:    Patient admitted with scrotal cellulitis/abscess. Noted documentation of Acute   Kidney Injury in ID consult on . In order to support the diagnosis of   ANNA, please include additional clinical indicators in your documentation. ? Or   please document if the diagnosis of ANNA has been ruled out after further   study. The medical record reflects the following:  Risk Factors: Hx CKD3a  Clinical Indicators: Creat 1.11-->1.36-->1.21; Treatment: 1L IVF bolus, BMP daily    Defined by Kidney Disease Improving Global Outcomes (KDIGO) clinical practice   guideline for acute kidney injury:  -Increase in SCr by greater than or equal to 0.3 mg/dl within 48 hours; or  -Increase or decrease in SCr to greater than or equal to 1.5 times baseline,   which is known or presumed to have occurred within the prior 7 days; or  -Urine volume < 0.5ml/kg/h for 6 hours. Options provided:  -- Acute kidney injury ruled out after study  -- Acute kidney injury evidenced by, Please document evidence as well as a   numerical baseline creatinine, if known.   -- Other - I will add my own diagnosis  -- Disagree - Not applicable / Not valid  -- Disagree - Clinically unable to determine / Unknown  -- Refer to Clinical Documentation Reviewer    PROVIDER RESPONSE TEXT:    This patient has acute kidney injury as evidenced by Increased creatinine from   1.11 on 2022 to 1.36 on 2022    Query created by: Armando Vázquez on 2022 10:48 AM      Electronically signed by:  Graham Nj MD 2022 10:33 AM

## 2022-11-30 ENCOUNTER — CARE COORDINATION (OUTPATIENT)
Dept: CARE COORDINATION | Age: 57
End: 2022-11-30

## 2022-11-30 NOTE — CARE COORDINATION
Referral from Chere Angelucci, RN-  Pt's A1c 9.5. Not testing sugar, supposed to bid. Needs info on how many carbs per meal, diet plan, mailers please     Will reach out to patient for consult.   MALINA Oconnor

## 2022-11-30 NOTE — CARE COORDINATION
Registered Dietitian Initial Assessment for Care Coordination      Name-Nathan Bunn Minium  November 30, 2022    Initial Referral Reason: Diabetes    Patient Care Team:  ELDA Ruiz CNP as PCP - General (Family Medicine)  ELDA Gilbert CNP as PCP - [facility]  Ivanna Jo RN as Care Transitions Nurse  Meg Qureshi MD as Consulting Physician (Infectious Diseases)  Cira Cobb RD, LD as Dietitian    Patient Active Problem List   Diagnosis    Benign hypertension with CKD (chronic kidney disease), stage II    MIGUEL treated with BiPAP    Type 2 diabetes mellitus with diabetic polyneuropathy, without long-term current use of insulin (HCC)    Low HDL (under 40)    Morbid obesity with BMI of 40.0-44.9, adult ([address]    Hypercoagulable state ([facility] [address]    Hyperhomocysteinemia (HCC)    Wound cellulitis    Mixed hyperlipidemia    Polyneuropathy    Current smoker    Mild intermittent asthma without complication    Stage 3a chronic kidney disease ([facility] [address]    Vitamin D deficiency    Arthritis of multiple sites    PVD (peripheral vascular disease) ([facility] [address]    Dermatitis    Moderate asthma without complication    Testicular abscess    Nonhealing open wound of groin    Hidradenitis suppurativa    Personal history of smoking    Lung nodule    Influenza vaccination declined       Current Outpatient Medications   Medication Sig Dispense Refill    varenicline (CHANTIX STARTING MONTH JENNIE) 0.5 MG X 11 & 1 MG X 42 tablet 0.5 mg po daily x 3 days, 0.5 mg BID x 4 days, then 1 mg BID thereafter . Call for refill 53 each 0    Semaglutide 3 MG TABS Take 3 mg by mouth daily 1st step 30 tablet 0    blood glucose monitor strips Testing twice a day. Please dispense according to patients insurance.  200 strip 3    hydroCHLOROthiazide (HYDRODIURIL) 25 MG tablet Take 1 tablet by mouth once daily 90 tablet 0    atorvastatin (LIPITOR) 20 MG tablet Take 1 tablet by mouth once daily 90 tablet 0    glipiZIDE (GLUCOTROL) 10 MG tablet Take 1 tablet by mouth every morning (before breakfast) 60 tablet 0    lisinopril (PRINIVIL;ZESTRIL) 5 MG tablet Take 1 tablet by mouth once daily 90 tablet 2    metFORMIN (GLUCOPHAGE) 1000 MG tablet TAKE 1 TABLET BY MOUTH TWICE DAILY WITH MEALS 180 tablet 2    albuterol sulfate HFA (PROVENTIL;VENTOLIN;PROAIR) 108 (90 Base) MCG/ACT inhaler Inhale 2 puffs into the lungs every 6 hours as needed for Wheezing 9 each 0    acetaminophen (TYLENOL) 500 MG tablet Take 1 tablet by mouth every 6 hours as needed for Pain (Take 1-2 tablets every 6 hours) 120 tablet 1    aspirin 81 MG chewable tablet Take 1 tablet by mouth daily 90 tablet 5    Multiple Vitamins-Minerals (THERAPEUTIC MULTIVITAMIN-MINERALS) tablet Take 1 tablet by mouth daily      b complex vitamins capsule Take 1 capsule by mouth daily       No current facility-administered medications for this visit.          Visit for:  Obesity/Weight loss  Diabetes:X  Hypertension:  Hyperlipidemia:  Other:     Anthropometric Measurements:  HT:6'  Weight:300  IBW:178 + or - 10%  BMI:40    Biochemical Data, Medical Tests and Procedures:    Lab Results   Component Value Date    LABA1C 9.5 11/22/2022     Lab Results   Component Value Date     02/13/2015       Lab Results   Component Value Date    CHOL 181 05/15/2017    CHOL 180 06/03/2015     Lab Results   Component Value Date    TRIG 186 (H) 05/15/2017    TRIG 154 (H) 06/03/2015     Lab Results   Component Value Date    HDL 34 (L) 07/31/2021    HDL 31 (L) 05/15/2017    HDL 29 (L) 06/03/2015     Lab Results   Component Value Date    LDLCHOLESTEROL 37 07/31/2021    LDLCHOLESTEROL 113 05/15/2017    LDLCHOLESTEROL 120 06/03/2015     Lab Results   Component Value Date    VLDL NOT REPORTED (H) 07/31/2021    VLDL NOT REPORTED 05/15/2017    VLDL NOT REPORTED 06/03/2015     Lab Results   Component Value Date    CHOLHDLRATIO 3.4 07/31/2021    CHOLHDLRATIO 5.8 (H) 05/15/2017    CHOLHDLRATIO 6.2 (H) 06/03/2015       Lab Results   Component Value Date    WBC 10.6 11/18/2022    HGB 13.1 (L) 11/18/2022    HCT 39.4 (L) 11/18/2022    MCV 84.1 11/18/2022     11/18/2022       Lab Results   Component Value Date    CREATININE 1.21 (H) 11/18/2022    BUN 21 (H) 11/18/2022     (L) 11/18/2022    K 4.3 11/18/2022    CL 96 (L) 11/18/2022    CO2 25 11/18/2022         NUTRITION DIAGNOSIS    #1 Problem  Excessive carbohydrate intake       Etiology  related to food and nutrition knowledge deficit       Signs/Symptoms  as evidenced by BtO8k-1.5    NUTRITION INTERVENTION  Nutrition Prescription:used IBW    diabetic diet providing 2200 kcals/day    Estimated daily CHO Needs: 60-75 gms/meal 15-30gms/snack  Protein needs:75gms/d    Patient Goals:  1. Patient will work on meal patten, currently eating 2meals/d, sometimes skips breakfast or lunch. Discussed and encouraged set meal times daily. Quick/easy lunch and breakfast suggestions discussed, will send patient a list of suggestions. Goal is 3 meals daily spaced every 4-5 hours. 2. Reviewed what foods contain carbohydrate to limit and how to measure out portions. Encouraged patient to limit carb intake to 6-75gms/meal, 15-30gms/snack. Patient admits snacks often in the evening, discussed low carb snacking options and will send patient lists of low carb snacks. 3. Discussed plate method, encouraged patient to increase intake of non-starchy vegetables. Goal is 1/2 of  plate to be non-starchy vegetables, 1/4 lean protein, 1/4 carbs. Discussed making sure he is having a protein sourceswith each meal due to non-healing wound- meats, fish, cottage cheese, peanut butter, nuts/seeds, ect. Foods from each category reviewed along with what a serving size is. 4. Discussed avoiding/limiting sweets and sweetened drinks. Patient admits he is drinking some pop and flavored creamer in coffee. Discussed alternatives and encouraged to replace pop with water.  Discussed  limiting/avoiding sugary foods- admits to eating sweets at times. Discussed sugar free alternatives to sweets- sugar free jello, pudding, ect. Encouraged patient to keep sweets out of the house as much as possible and limit to once a week or less. Nutrition Intervention Need:  Initial/Brief:  Comprehensive Nutrition Education:X  Coordination of other care during nutrition care:    Monitoring and Evaluation:  Ability to plan meals/snacks:X  Ability to select healthy foods/meals:X  Food and Nutrition Knowledge:X  Self Monitoring:  Physical Activity:  Energy intake:  Fluid/beverage intake:  Fat/chol intake:  Carb intake:X  Other:    Plan:  1. Will continue to educate patient on reading food labels, measuring out portions, carb counting, low carb snacking, plate method, importance of meal pattern, quick/easy diabetic  meal ideas,  and limiting/cutting out sugary drinks and sweets. Will mail patient nutrition information on all the above discussed.   2. Will follow up in 2-3 weeks to review nutrition information and answer questions     MALINA Buenrostro

## 2022-12-01 ENCOUNTER — CARE COORDINATION (OUTPATIENT)
Dept: CARE COORDINATION | Age: 57
End: 2022-12-01

## 2022-12-02 ENCOUNTER — HOSPITAL ENCOUNTER (OUTPATIENT)
Dept: WOUND CARE | Age: 57
Discharge: HOME | End: 2022-12-02
Payer: COMMERCIAL

## 2022-12-02 VITALS
DIASTOLIC BLOOD PRESSURE: 83 MMHG | BODY MASS INDEX: 40.63 KG/M2 | TEMPERATURE: 97.1 F | RESPIRATION RATE: 19 BRPM | HEART RATE: 101 BPM | SYSTOLIC BLOOD PRESSURE: 155 MMHG | HEIGHT: 72 IN | WEIGHT: 300 LBS

## 2022-12-02 PROCEDURE — 11043 DBRDMT MUSC&/FSCA 1ST 20/<: CPT

## 2022-12-02 PROCEDURE — 99213 OFFICE O/P EST LOW 20 MIN: CPT

## 2022-12-02 RX ORDER — LIDOCAINE HYDROCHLORIDE 40 MG/ML
SOLUTION TOPICAL ONCE
OUTPATIENT
Start: 2022-12-02 | End: 2022-12-02

## 2022-12-02 RX ORDER — GINSENG 100 MG
CAPSULE ORAL ONCE
OUTPATIENT
Start: 2022-12-02 | End: 2022-12-02

## 2022-12-02 RX ORDER — BETAMETHASONE DIPROPIONATE 0.05 %
OINTMENT (GRAM) TOPICAL ONCE
OUTPATIENT
Start: 2022-12-02 | End: 2022-12-02

## 2022-12-02 RX ORDER — LIDOCAINE 50 MG/G
OINTMENT TOPICAL ONCE
OUTPATIENT
Start: 2022-12-02 | End: 2022-12-02

## 2022-12-02 RX ORDER — LIDOCAINE HYDROCHLORIDE 20 MG/ML
JELLY TOPICAL ONCE
OUTPATIENT
Start: 2022-12-02 | End: 2022-12-02

## 2022-12-02 RX ORDER — CLOBETASOL PROPIONATE 0.5 MG/G
OINTMENT TOPICAL ONCE
OUTPATIENT
Start: 2022-12-02 | End: 2022-12-02

## 2022-12-02 RX ORDER — BACITRACIN ZINC AND POLYMYXIN B SULFATE 500; 1000 [USP'U]/G; [USP'U]/G
OINTMENT TOPICAL ONCE
OUTPATIENT
Start: 2022-12-02 | End: 2022-12-02

## 2022-12-02 RX ORDER — LIDOCAINE 40 MG/G
CREAM TOPICAL ONCE
OUTPATIENT
Start: 2022-12-02 | End: 2022-12-02

## 2022-12-02 RX ORDER — BACITRACIN, NEOMYCIN, POLYMYXIN B 400; 3.5; 5 [USP'U]/G; MG/G; [USP'U]/G
OINTMENT TOPICAL ONCE
OUTPATIENT
Start: 2022-12-02 | End: 2022-12-02

## 2022-12-02 RX ORDER — GENTAMICIN SULFATE 1 MG/G
OINTMENT TOPICAL ONCE
OUTPATIENT
Start: 2022-12-02 | End: 2022-12-02

## 2022-12-02 NOTE — PROGRESS NOTES
[address]:     [facility] [place]:   [address] [facility]  [address] [address]  [phone]  [phone]     Patient Information:      Sheeba Drew [address]  [facility] [address] [address]   [phone]   : 1965  AGE: 65 y.o. GENDER: male   EPISODE DATE: 2022    Insurance:      PRIMARY INSURANCE:  Plan: [employer] [address]  Coverage: [employer]  Effective Date: 10/1/2022  Group Number: [unfilled]  Subscriber Number: [id number] - (Commercial)    Payer/Plan Subscr  Sex Relation Sub. Ins. ID Effective Group Num   1. [address] [facility] 1965 Male Self [id number] 10/1/22 [id number]                                   [address]       Patient Wound Information:      Problem List Items Addressed This Visit    None      WOUNDS REQUIRING DRESSING SUPPLIES:     Wound 22 Scrotum Left #1 (Active)   Wound Image   22 1008   Wound Etiology Surgical 22 1008   Dressing Status New drainage noted; Old drainage noted 22 1008   Wound Cleansed Cleansed with saline 22 1008   Dressing/Treatment Moist to dry;ABD 22 1640   Dressing Change Due 22 0000   Wound Length (cm) 6 cm 22 1008   Wound Width (cm) 2.1 cm 22 1008   Wound Depth (cm) 0.5 cm 22 1008   Wound Surface Area (cm^2) 12.6 cm^2 22 1008   Change in Wound Size % (l*w) 22.46 22 1008   Wound Volume (cm^3) 6.3 cm^3 22 1008   Wound Healing % 91 22 1008   Post-Procedure Length (cm) 6 cm 22 1008   Post-Procedure Width (cm) 2.1 cm 22 1008   Post-Procedure Depth (cm) 0.5 cm 22 1008   Post-Procedure Surface Area (cm^2) 12.6 cm^2 22 1008   Post-Procedure Volume (cm^3) 6.3 cm^3 22 1008   Wound Assessment Pink/red 22 1008   Drainage Amount Moderate 22 1008   Drainage Description Serosanguinous;Green 12/02/22 1008   Odor None 12/02/22 1008   Kavitha-wound Assessment Blanchable erythema 12/02/22 1008   Margins Defined edges 12/02/22 1008   Wound Thickness Description not for Pressure Injury Full thickness 12/02/22 1008   Number of days: 14          Supplies Requested :      WOUND #: 1   PRIMARY DRESSING:  Katie   Cover and Secure with: ABD pad     FREQUENCY OF DRESSING CHANGES:  Daily       ADDITIONAL ITEMS:  [] Gloves Small  [] Gloves Medium [] Gloves Large [x] Gloves XLarge  [] Tape 1\" [] Tape 2\" [] Tape 3\"  [] Medipore Tape  [x] Saline  [] Skin Prep   [] Adhesive Remover   [] Cotton Tip Applicators   [] Other:    Patient Wound(s) Debrided: [x] Yes if yes please add date 12/2/22  [] No    Debribement Type: Excisional/Sharp    Is the patient currently on an antibiotic for their Wound(s): [] Yes if yes please add name and dose    [x] No    Patient currently being seen by Home Health: [] Yes   [x] No    Duration for needed supplies:  []15  [x]30  []60  []90 Days    Electronically signed by Karen Suarez RN on 12/2/2022 at 10:29 AM     Provider Information:      PROVIDER'S NAME: Scooter FONSECA#[id number]

## 2022-12-02 NOTE — PLAN OF CARE
Problem: Chronic Conditions and Co-morbidities  Goal: Patient's chronic conditions and co-morbidity symptoms are monitored and maintained or improved  Outcome: Progressing     Problem: Discharge Planning  Goal: Discharge to home or other facility with appropriate resources  Outcome: Progressing     Problem: ABCDS Injury Assessment  Goal: Absence of physical injury  Outcome: Progressing     Problem: Wound:  Goal: Will show signs of wound healing; wound closure and no evidence of infection  Description: Will show signs of wound healing; wound closure and no evidence of infection   Outcome: Progressing     Problem: Falls - Risk of:  Goal: Will remain free from falls  Description: Will remain free from falls   Outcome: Progressing

## 2022-12-02 NOTE — PROGRESS NOTES
[address]   Progress Note and Procedure Note      Lennox Aguilar  MEDICAL RECORD NUMBER:  [id number]  AGE: 65 y.o. GENDER: male  : 1965  EPISODE DATE:  2022    Subjective:     Chief Complaint   Patient presents with    Wound Check     groin         HISTORY of PRESENT ILLNESS HPI     Lennox Aguilar is a 65 y.o. male who presents today for wound/ulcer evaluation. History of Wound Context: The patient Is here for left scrotum wound, with no significant purulent drainage today. He was hospitalized recently at [facility] 1622 through 2022 for scrotal abscess status post perianal/scrotal exploration, incision and debridement for scrotal abscess. The patient was treated with IV antibiotics during his hospitalization was discharged on oral Augmentin and Zyvox that was completed. 2022 scrotal abscess culture grew group C and group B streptococcus. Wound/Ulcer Pain Timing/Severity: intermittent  Quality of pain: dull  Severity:  2  10   Modifying Factors: None  Associated Signs/Symptoms: none    Ulcer Identification:  Ulcer Type: non-healing surgical  Contributing Factors: diabetes, poor glucose control, chronic pressure, and obesity    Wound: N/A        PAST MEDICAL HISTORY        Diagnosis Date    Anesthesia     NEVER HAD, FEARFUL HE'LL WAKE UP DISORIENTED & HURT SOMEONE. Benign hypertension with CKD (chronic kidney disease), stage II 10/6/2014    Diabetes mellitus ([facility] [address]     Hypertension     Sleep apnea     CPAP MACHINE AT NIGHT.     Umbilical hernia     Wears dentures     UPPER        PAST SURGICAL HISTORY    Past Surgical History:   Procedure Laterality Date    SCROTAL SURGERY N/A 2022    EXPLORATION OF SCROTUM, PERINEUM, LEFT INGUINAL REGION performed by Marsha Freitas MD at [address] [place] [address]  2022    INCISION AND DRAINAGE WITH WASHOUT performed by Marsha Freitas MD at [address] [place] [address] REPAIR  5/24/16       FAMILY HISTORY    Family History   Problem Relation Age of Onset    Diabetes Father     High Blood Pressure Father        SOCIAL HISTORY    Social History     Tobacco Use    Smoking status: Every Day     Packs/day: 2.00     Years: 15.00     Pack years: 30.00     Types: Cigarettes    Smokeless tobacco: Never   Vaping Use    Vaping Use: Never used   Substance Use Topics    Alcohol use: No    Drug use: No       ALLERGIES    Allergies   Allergen Reactions    Januvia [Sitagliptin] Other (See Comments)     Constipation       MEDICATIONS    Current Outpatient Medications on File Prior to Encounter   Medication Sig Dispense Refill    varenicline (CHANTIX STARTING MONTH PAK) 0.5 MG X 11 & 1 MG X 42 tablet 0.5 mg po daily x 3 days, 0.5 mg BID x 4 days, then 1 mg BID thereafter . Call for refill 53 each 0    Semaglutide 3 MG TABS Take 3 mg by mouth daily 1st step 30 tablet 0    blood glucose monitor strips Testing twice a day. Please dispense according to patients insurance.  200 strip 3    hydroCHLOROthiazide (HYDRODIURIL) 25 MG tablet Take 1 tablet by mouth once daily 90 tablet 0    atorvastatin (LIPITOR) 20 MG tablet Take 1 tablet by mouth once daily 90 tablet 0    glipiZIDE (GLUCOTROL) 10 MG tablet Take 1 tablet by mouth every morning (before breakfast) 60 tablet 0    lisinopril (PRINIVIL;ZESTRIL) 5 MG tablet Take 1 tablet by mouth once daily 90 tablet 2    metFORMIN (GLUCOPHAGE) 1000 MG tablet TAKE 1 TABLET BY MOUTH TWICE DAILY WITH MEALS 180 tablet 2    albuterol sulfate HFA (PROVENTIL;VENTOLIN;PROAIR) 108 (90 Base) MCG/ACT inhaler Inhale 2 puffs into the lungs every 6 hours as needed for Wheezing 9 each 0    acetaminophen (TYLENOL) 500 MG tablet Take 1 tablet by mouth every 6 hours as needed for Pain (Take 1-2 tablets every 6 hours) 120 tablet 1    aspirin 81 MG chewable tablet Take 1 tablet by mouth daily 90 tablet 5    [DISCONTINUED] glimepiride (AMARYL) 4 MG tablet Take 1 tablet by mouth 2 times 1    Post Debridement Measurements:  Wound/Ulcer Descriptions are Pre Debridement except measurements:        Wound 11/17/22 Scrotum Left #1 (Active)   Wound Image   12/02/22 1008   Wound Etiology Surgical 12/02/22 1008   Dressing Status New drainage noted; Old drainage noted 12/02/22 1008   Wound Cleansed Cleansed with saline 12/02/22 1008   Dressing/Treatment Moist to dry;ABD 11/18/22 1640   Dressing Change Due 11/18/22 11/18/22 0000   Wound Length (cm) 6 cm 12/02/22 1008   Wound Width (cm) 2.1 cm 12/02/22 1008   Wound Depth (cm) 0.5 cm 12/02/22 1008   Wound Surface Area (cm^2) 12.6 cm^2 12/02/22 1008   Change in Wound Size % (l*w) 22.46 12/02/22 1008   Wound Volume (cm^3) 6.3 cm^3 12/02/22 1008   Wound Healing % 91 12/02/22 1008   Post-Procedure Length (cm) 6 cm 12/02/22 1008   Post-Procedure Width (cm) 2.1 cm 12/02/22 1008   Post-Procedure Depth (cm) 0.5 cm 12/02/22 1008   Post-Procedure Surface Area (cm^2) 12.6 cm^2 12/02/22 1008   Post-Procedure Volume (cm^3) 6.3 cm^3 12/02/22 1008   Wound Assessment Pink/red 12/02/22 1008   Drainage Amount Moderate 12/02/22 1008   Drainage Description Serosanguinous;Green 12/02/22 1008   Odor None 12/02/22 1008   Kavitha-wound Assessment Blanchable erythema 12/02/22 1008   Margins Defined edges 12/02/22 1008   Wound Thickness Description not for Pressure Injury Full thickness 12/02/22 1008   Number of days: 14          Percent of Wound(s)/Ulcer(s) Debrided: 100%    Total Surface Area Debrided:  12.6 sq cm         Estimated Blood Loss:  Minimal    Hemostasis Achieved:  by pressure    Procedural Pain:  3  / 10     Post Procedural Pain:  2 / 10     Response to treatment:  Well tolerated by patient. Plan:     Treatment Note please see Discharge Instructions    Written patient dismissal instructions given to patient and signed by patient or POA.              Electronically signed by Lucrecia Arechiga MD on 12/2/2022 at 10:22 AM

## 2022-12-04 NOTE — DISCHARGE INSTRUCTIONS
[address] [place] [facility]                            Visit  Discharge Instructions / Physician Orders  DATE: 12/8/2022     Home Care:NONE     SUPPLIES ORDERED THRU:         [employer]            DATE LAST SUPPLIED ordered 12/2/22     Wound Location:  Scrotum     Cleanse with: Liquid antibacterial soap and water, rinse well      Dressing Orders:  Primary dressing      Katie            Secondary dressing     Abd pad                      secure with           x 30days     Frequency:  Daily     Additional Orders: Increase protein to diet (meat, cheese, eggs, fish, peanut butter, nuts and beans)  Multivitamin daily     OFFLOADING [] YES  TYPE:                  [] NA     Weekly wound care visits until determined otherwise. Antibiotic therapy-wound care related YES [] NO [] NA[]     MY CHART []     Smart Device  []      HYPERBARIC TREATMENT-                TREATMENT #                          Your next appointment with the [address] [place] [address] is in 1 week                                                                                                   (Please note your next appointment above and if you are unable to keep, kindly give a 24 hour notice. Thank you.)  If more than 15 min late we cannot guarantee you will be seen due to clinician schedule  Per Policy, Excessive cancellation will call for dismissal from program.  If you experience any of the following, please call the [address] [place] [address] during business hours:  [phone]     * Increase in Pain  * Temperature over 101  * Increase in drainage from your wound  * Drainage with a foul odor  * Bleeding  * Increase in swelling  * Need for compression bandage changes due to slippage, breakthrough drainage. If you need medical attention outside of the business hours of the [address] [place] [address] please contact your PCP or go to the nearest emergency room.      The information contained in the After Visit Summary has been reviewed with me, the patient and/or responsible adult, by my health care provider(s). I had the opportunity to ask questions regarding this information.  I have elected to receive;      []After Visit Summary  [x]Comprehensive Discharge Instruction        Patient signature______________________________________Date:________

## 2022-12-05 NOTE — CARE COORDINATION
Initial Contact Social Work Note - Ambulatory  12/5/2022      Date of referral: 12/2/2022  Referral received from: [address]  Reason for referral: financial struggles    Previous SW referral: No  If yes, brief summary of outcome: n/a    Two Identifiers Verified: Yes    Insurance Provider: [employer] [place]     Support System:  Sibling(s)     Status:  n/a    Community Providers:  None    ADL Assistance Needed: N/A    Housing/Living Concerns or Home Modification Needs: no     Transportation Concern: no    Medication Cost Concern: yes     Medication Adherence Concern: no    Financial Concern(s): yes    Income (only if applicable): Short-term disability. Ability to Read/Write: Yes    Advance Care Plan:  N/A    Other: Medication assistance    Identified Needs:  Medication assistance    Social Work Plan:  Research [employer] to see if assistance programs for diabetic medications for Blaise. Method of Communication With Provider (if appropriate): None       Goals Addressed                      This Visit's Progress       Patient Stated      Patient Stated (pt-stated)         Blaise will qualify for medication assistance program for his diabetic medication. Barriers: lack of support, stress, and time constraints  Plan for overcoming my barriers: Blaise will work with care coordination .    Confidence: 7/10  Anticipated Goal Completion Date: 01/01/2023

## 2022-12-06 NOTE — PROGRESS NOTES
Physician Progress Note      Liam Vogt  [facility] #:                  [id number]  :                       1965  ADMIT DATE:       2022 12:21 PM  [address] [place] DATE:        2022 8:24 PM  RESPONDING  PROVIDER #:        Keith Alfonso TEXT:    Patient admitted with scrotal abscess. Per Op note dated  documentation   of debridement of scrotum. To accurately reflect the procedure performed   please document if debridement was excisional or non-excisional and the   deepest depth of tissue removed as down to and including: The medical record reflects the following:  Risk Factors:  Clinical Indicators: per  OP note--> we explored the perineum and   identified the extent of the abscess with blunt finger dissection. We also   explored the inguinal region, and opened into a small pocket. We then   extended the incision to the posterior scrotum, and explored the scrotum. There was a sinus track which was dissected bluntly and then opened to help   with packing and drainage. At this time we irrigated with copious amount of   fluid. Necrotic tissue was debrided and wound edges were healthy. Hemostasis   was achieved. Treatment: OR for I&D of scrotal abscess, urology consult  Options provided:  -- Non-excisional debridement of skin  -- Excisional debridement of skin  -- Non-excisional debridement of subcutaneous tissue  -- Excisional debridement of subcutaneous tissue  -- Non-excisional debridement of fascia  -- Excisional debridement of fascia  -- Non-excisional debridement of muscle  -- Excisional debridement of muscle  -- Other - I will add my own diagnosis  -- Disagree - Not applicable / Not valid  -- Disagree - Clinically unable to determine / Unknown  -- Refer to Clinical Documentation Reviewer    PROVIDER RESPONSE TEXT:    Excisional debridement of skin was performed of scrotum during procedure on   .     Query created by: Meagan Hernandez on 11/17/2022 12:14 PM      Electronically signed by:  Jory Serrano X 12/6/2022 10:52 AM

## 2022-12-07 ENCOUNTER — CARE COORDINATION (OUTPATIENT)
Dept: CARE COORDINATION | Age: 57
End: 2022-12-07

## 2022-12-07 NOTE — CARE COORDINATION
attempted to contact Ceferino Whiting to discuss medications assistance programs.  left message with name and number requesting a call back to [phone]. Plan:    will attempt to follow up with Ceferino Whiting to discuss medications and possible assistance programs.

## 2022-12-09 ENCOUNTER — HOSPITAL ENCOUNTER (OUTPATIENT)
Dept: WOUND CARE | Age: 57
Discharge: HOME | End: 2022-12-09

## 2022-12-10 NOTE — DISCHARGE INSTRUCTIONS
[address] [facility]                            Visit  Discharge Instructions / Physician Orders  DATE: 12/12/2022     Home Care:NONE     SUPPLIES ORDERED THRU:         [employer]            DATE LAST SUPPLIED ordered 12/2/22     Wound Location:  Scrotum     Cleanse with: Liquid antibacterial soap and water, rinse well      Dressing Orders:  Primary dressing      Katie            Secondary dressing    4X4                   secure with           x 30days     Frequency:  Daily     Additional Orders: Increase protein to diet (meat, cheese, eggs, fish, peanut butter, nuts and beans)  Multivitamin daily     OFFLOADING [] YES  TYPE:                  [] NA     Weekly wound care visits until determined otherwise. Antibiotic therapy-wound care related YES [] NO [x] NA[]     MY CHART []     Smart Device  []      HYPERBARIC TREATMENT-                TREATMENT #                          Your next appointment with the [address] [place] [address] is in 1 week                                                                                                   (Please note your next appointment above and if you are unable to keep, kindly give a 24 hour notice. Thank you.)  If more than 15 min late we cannot guarantee you will be seen due to clinician schedule  Per Policy, Excessive cancellation will call for dismissal from program.  If you experience any of the following, please call the [address] [place] [address] during business hours:  [phone]     * Increase in Pain  * Temperature over 101  * Increase in drainage from your wound  * Drainage with a foul odor  * Bleeding  * Increase in swelling  * Need for compression bandage changes due to slippage, breakthrough drainage. If you need medical attention outside of the business hours of the [address] [place] [address] please contact your PCP or go to the nearest emergency room.      The information contained in the After Visit Summary has been reviewed with me, the patient and/or responsible adult, by my health care provider(s). I had the opportunity to ask questions regarding this information.  I have elected to receive;      []After Visit Summary  [x]Comprehensive Discharge Instruction        Patient signature______________________________________Date:________  Electronically signed by Stewart Doe RN on 12/12/2022 at 9:55 AM  Electronically signed by Tan Solorio MD on 12/12/2022 at 9:56 AM

## 2022-12-12 ENCOUNTER — HOSPITAL ENCOUNTER (OUTPATIENT)
Dept: WOUND CARE | Age: 57
Discharge: HOME | End: 2022-12-12
Payer: COMMERCIAL

## 2022-12-12 ENCOUNTER — HOSPITAL ENCOUNTER (OUTPATIENT)
Age: 57
Discharge: HOME | End: 2022-12-12
Payer: COMMERCIAL

## 2022-12-12 VITALS
SYSTOLIC BLOOD PRESSURE: 130 MMHG | DIASTOLIC BLOOD PRESSURE: 65 MMHG | TEMPERATURE: 98.1 F | HEART RATE: 97 BPM | BODY MASS INDEX: 40.69 KG/M2 | WEIGHT: 300 LBS | RESPIRATION RATE: 18 BRPM

## 2022-12-12 DIAGNOSIS — Z11.59 ENCOUNTER FOR SCREENING FOR OTHER VIRAL DISEASES: ICD-10-CM

## 2022-12-12 DIAGNOSIS — E78.2 MIXED HYPERLIPIDEMIA: ICD-10-CM

## 2022-12-12 DIAGNOSIS — N18.2 BENIGN HYPERTENSION WITH CKD (CHRONIC KIDNEY DISEASE), STAGE II: ICD-10-CM

## 2022-12-12 DIAGNOSIS — I12.9 BENIGN HYPERTENSION WITH CKD (CHRONIC KIDNEY DISEASE), STAGE II: ICD-10-CM

## 2022-12-12 DIAGNOSIS — S31.109A NONHEALING OPEN WOUND OF GROIN: Primary | ICD-10-CM

## 2022-12-12 DIAGNOSIS — E11.42 TYPE 2 DIABETES MELLITUS WITH DIABETIC POLYNEUROPATHY, WITHOUT LONG-TERM CURRENT USE OF INSULIN (HCC): ICD-10-CM

## 2022-12-12 DIAGNOSIS — L73.2 HIDRADENITIS SUPPURATIVA: ICD-10-CM

## 2022-12-12 LAB
ABSOLUTE EOS #: 0.2 K/UL (ref 0–0.4)
ABSOLUTE LYMPH #: 1.8 K/UL (ref 1–4.8)
ABSOLUTE MONO #: 0.7 K/UL (ref 0.1–1.3)
ALBUMIN SERPL-MCNC: 3.6 G/DL (ref 3.5–5.2)
ALP BLD-CCNC: 99 U/L (ref 40–129)
ALT SERPL-CCNC: 18 U/L (ref 5–41)
ANION GAP SERPL CALCULATED.3IONS-SCNC: 9 MMOL/L (ref 9–17)
AST SERPL-CCNC: 13 U/L
BASOPHILS # BLD: 1 % (ref 0–2)
BASOPHILS ABSOLUTE: 0 K/UL (ref 0–0.2)
BILIRUB SERPL-MCNC: 0.3 MG/DL (ref 0.3–1.2)
BUN BLDV-MCNC: 21 MG/DL (ref 6–20)
CALCIUM SERPL-MCNC: 9.1 MG/DL (ref 8.6–10.4)
CHLORIDE BLD-SCNC: 103 MMOL/L (ref 98–107)
CHOLESTEROL/HDL RATIO: 4
CHOLESTEROL: 141 MG/DL
CO2: 23 MMOL/L (ref 20–31)
CREAT SERPL-MCNC: 1.07 MG/DL (ref 0.7–1.2)
EOSINOPHILS RELATIVE PERCENT: 2 % (ref 0–4)
ESTIMATED AVERAGE GLUCOSE: 214 MG/DL
FOLATE: 16.3 NG/ML
GFR SERPL CREATININE-BSD FRML MDRD: >60 ML/MIN/1.73M2
GLUCOSE BLD-MCNC: 223 MG/DL (ref 70–99)
HBA1C MFR BLD: 9.1 % (ref 4–6)
HBV SURFACE AB TITR SER: <3.5 MIU/ML
HCT VFR BLD CALC: 41.2 % (ref 41–53)
HDLC SERPL-MCNC: 35 MG/DL
HEMOGLOBIN: 14 G/DL (ref 13.5–17.5)
LDL CHOLESTEROL: 83 MG/DL (ref 0–130)
LYMPHOCYTES # BLD: 17 % (ref 24–44)
MAGNESIUM: 1.6 MG/DL (ref 1.6–2.6)
MCH RBC QN AUTO: 28.6 PG (ref 26–34)
MCHC RBC AUTO-ENTMCNC: 34.1 G/DL (ref 31–37)
MCV RBC AUTO: 83.9 FL (ref 80–100)
MONOCYTES # BLD: 6 % (ref 1–7)
PDW BLD-RTO: 15.3 % (ref 11.5–14.9)
PLATELET # BLD: 209 K/UL (ref 150–450)
PMV BLD AUTO: 7.8 FL (ref 6–12)
POTASSIUM SERPL-SCNC: 4.3 MMOL/L (ref 3.7–5.3)
RBC # BLD: 4.91 M/UL (ref 4.5–5.9)
SEG NEUTROPHILS: 74 % (ref 36–66)
SEGMENTED NEUTROPHILS ABSOLUTE COUNT: 8.2 K/UL (ref 1.3–9.1)
SODIUM BLD-SCNC: 135 MMOL/L (ref 135–144)
TOTAL PROTEIN: 7.3 G/DL (ref 6.4–8.3)
TRIGL SERPL-MCNC: 113 MG/DL
TSH SERPL DL<=0.05 MIU/L-ACNC: 0.85 UIU/ML (ref 0.3–5)
VITAMIN B-12: 624 PG/ML (ref 232–1245)
WBC # BLD: 10.9 K/UL (ref 3.5–11)

## 2022-12-12 PROCEDURE — 85025 COMPLETE CBC W/AUTO DIFF WBC: CPT

## 2022-12-12 PROCEDURE — 80053 COMPREHEN METABOLIC PANEL: CPT

## 2022-12-12 PROCEDURE — 11043 DBRDMT MUSC&/FSCA 1ST 20/<: CPT

## 2022-12-12 PROCEDURE — 82746 ASSAY OF FOLIC ACID SERUM: CPT

## 2022-12-12 PROCEDURE — 82607 VITAMIN B-12: CPT

## 2022-12-12 PROCEDURE — 83036 HEMOGLOBIN GLYCOSYLATED A1C: CPT

## 2022-12-12 PROCEDURE — 11043 DBRDMT MUSC&/FSCA 1ST 20/<: CPT | Performed by: INTERNAL MEDICINE

## 2022-12-12 PROCEDURE — 36415 COLL VENOUS BLD VENIPUNCTURE: CPT

## 2022-12-12 PROCEDURE — 83735 ASSAY OF MAGNESIUM: CPT

## 2022-12-12 PROCEDURE — 80061 LIPID PANEL: CPT

## 2022-12-12 PROCEDURE — 84443 ASSAY THYROID STIM HORMONE: CPT

## 2022-12-12 PROCEDURE — 86317 IMMUNOASSAY INFECTIOUS AGENT: CPT

## 2022-12-12 RX ORDER — LIDOCAINE HYDROCHLORIDE 40 MG/ML
SOLUTION TOPICAL ONCE
Status: COMPLETED | OUTPATIENT
Start: 2022-12-12 | End: 2022-12-12

## 2022-12-12 RX ORDER — GENTAMICIN SULFATE 1 MG/G
OINTMENT TOPICAL ONCE
OUTPATIENT
Start: 2022-12-12 | End: 2022-12-12

## 2022-12-12 RX ORDER — CLOBETASOL PROPIONATE 0.5 MG/G
OINTMENT TOPICAL ONCE
OUTPATIENT
Start: 2022-12-12 | End: 2022-12-12

## 2022-12-12 RX ORDER — BACITRACIN ZINC AND POLYMYXIN B SULFATE 500; 1000 [USP'U]/G; [USP'U]/G
OINTMENT TOPICAL ONCE
OUTPATIENT
Start: 2022-12-12 | End: 2022-12-12

## 2022-12-12 RX ORDER — LIDOCAINE HYDROCHLORIDE 20 MG/ML
JELLY TOPICAL ONCE
OUTPATIENT
Start: 2022-12-12 | End: 2022-12-12

## 2022-12-12 RX ORDER — GINSENG 100 MG
CAPSULE ORAL ONCE
OUTPATIENT
Start: 2022-12-12 | End: 2022-12-12

## 2022-12-12 RX ORDER — LIDOCAINE HYDROCHLORIDE 40 MG/ML
SOLUTION TOPICAL ONCE
OUTPATIENT
Start: 2022-12-12 | End: 2022-12-12

## 2022-12-12 RX ORDER — BACITRACIN, NEOMYCIN, POLYMYXIN B 400; 3.5; 5 [USP'U]/G; MG/G; [USP'U]/G
OINTMENT TOPICAL ONCE
OUTPATIENT
Start: 2022-12-12 | End: 2022-12-12

## 2022-12-12 RX ORDER — BETAMETHASONE DIPROPIONATE 0.05 %
OINTMENT (GRAM) TOPICAL ONCE
OUTPATIENT
Start: 2022-12-12 | End: 2022-12-12

## 2022-12-12 RX ORDER — LIDOCAINE 40 MG/G
CREAM TOPICAL ONCE
OUTPATIENT
Start: 2022-12-12 | End: 2022-12-12

## 2022-12-12 RX ORDER — LIDOCAINE 50 MG/G
OINTMENT TOPICAL ONCE
OUTPATIENT
Start: 2022-12-12 | End: 2022-12-12

## 2022-12-12 RX ADMIN — LIDOCAINE HYDROCHLORIDE 5 ML: 40 SOLUTION TOPICAL at 09:42

## 2022-12-12 ASSESSMENT — PAIN SCALES - GENERAL: PAINLEVEL_OUTOF10: 0

## 2022-12-12 NOTE — PLAN OF CARE
Problem: Chronic Conditions and Co-morbidities  Goal: Patient's chronic conditions and co-morbidity symptoms are monitored and maintained or improved  Outcome: Progressing     Problem: Discharge Planning  Goal: Discharge to home or other facility with appropriate resources  Outcome: Progressing     Problem: Wound:  Goal: Will show signs of wound healing; wound closure and no evidence of infection  Description: Will show signs of wound healing; wound closure and no evidence of infection   Outcome: Progressing     Problem: Falls - Risk of:  Goal: Will remain free from falls  Description: Will remain free from falls   Outcome: Progressing

## 2022-12-12 NOTE — PROGRESS NOTES
[address]   Progress Note and Procedure Note      Jonas Bourne  MEDICAL RECORD NUMBER:  [id number]  AGE: 65 y.o. GENDER: male  : 1965  EPISODE DATE:  2022    Subjective:     Chief Complaint   Patient presents with    Wound Check     scrotum         HISTORY of PRESENT ILLNESS HPI     Jonas Bourne is a 65 y.o. male who presents today for wound/ulcer evaluation. History of Wound Context: The patient Is here for left scrotum wound that is improving, denied increased pain, no purulent drainage, no fever or chills, no other complaints. He was hospitalized recently at [facility] 1622 through 2022 for scrotal abscess status post perianal/scrotal exploration, incision and debridement for scrotal abscess. The patient was treated with IV antibiotics during his hospitalization was discharged on oral Augmentin and Zyvox that was completed. 2022 scrotal abscess culture grew group C and group B streptococcus. Wound/Ulcer Pain Timing/Severity: intermittent  Quality of pain: dull  Severity:  2 / 10   Modifying Factors: None  Associated Signs/Symptoms: none    Ulcer Identification:  Ulcer Type: non-healing surgical  Contributing Factors: diabetes, poor glucose control, chronic pressure, and obesity    Wound: N/A        PAST MEDICAL HISTORY        Diagnosis Date    Anesthesia     NEVER HAD, FEARFUL HE'LL WAKE UP DISORIENTED & HURT SOMEONE. Benign hypertension with CKD (chronic kidney disease), stage II 10/6/2014    Diabetes mellitus ([facility] [address]     Hypertension     Sleep apnea     CPAP MACHINE AT NIGHT.     Umbilical hernia     Wears dentures     UPPER        PAST SURGICAL HISTORY    Past Surgical History:   Procedure Laterality Date    SCROTAL SURGERY N/A 2022    EXPLORATION OF SCROTUM, PERINEUM, LEFT INGUINAL REGION performed by Aysha Morel MD at [address] [place] [address]  2022    INCISION AND DRAINAGE WITH WASHOUT performed for Pain (Take 1-2 tablets every 6 hours) 120 tablet 1    aspirin 81 MG chewable tablet Take 1 tablet by mouth daily 90 tablet 5    [DISCONTINUED] glimepiride (AMARYL) 4 MG tablet Take 1 tablet by mouth 2 times daily 60 tablet 0    [DISCONTINUED] lisinopril (PRINIVIL;ZESTRIL) 5 MG tablet Take 1 tablet by mouth daily 30 tablet 2    Multiple Vitamins-Minerals (THERAPEUTIC MULTIVITAMIN-MINERALS) tablet Take 1 tablet by mouth daily      b complex vitamins capsule Take 1 capsule by mouth daily       No current facility-administered medications on file prior to encounter. REVIEW OF SYSTEMS    Pertinent items are noted in HPI. Objective:      /65   Pulse 97   Temp 98.1 °F (36.7 °C) (Tympanic)   Resp 18   Wt 300 lb (136.1 kg)   BMI 40.69 kg/m²     Wt Readings from Last 3 Encounters:   12/12/22 300 lb (136.1 kg)   12/02/22 300 lb (136.1 kg)   11/22/22 300 lb (136.1 kg)       PHYSICAL EXAM    General Appearance: alert and oriented to person, place and time, well developed and well- nourished, in no acute distress  Skin: warm and dry, no rash or erythema  Head: normocephalic and atraumatic  Eyes: pupils equal, round, and reactive to light, conjunctivae normal  Neck: supple and non-tender   Abdomen: soft, non-distended  Extremities: no cyanosis, clubbing or edema  Neurologic:  no cranial nerve deficit,  coordination and speech normal      Assessment:     Problem List Items Addressed This Visit       Nonhealing open wound of groin - Primary    Relevant Orders    Initiate Outpatient Wound Care Protocol        Procedure Note  Indications:  Based on my examination of this patient's wound(s)/ulcer(s) today, debridement is required to promote healing and evaluate the wound base.     Performed by: Meg Qureshi MD    Consent obtained:  Yes    Time out taken:  Yes    Pain Control: Anesthetic  Anesthetic: 4% Lidocaine Liquid Topical       Debridement:Excisional Debridement    Using curette the wound(s)/ulcer(s) was/were sharply debrided down through and including the removal of subcutaneous tissue and muscle/fascia. Devitalized Tissue Debrided:  fibrin, biofilm, and slough    Pre Debridement Measurements:  Are located in the [facility]  Documentation Flow Sheet    Wound/Ulcer #: 1    Post Debridement Measurements:  Wound/Ulcer Descriptions are Pre Debridement except measurements:        Wound 11/17/22 Scrotum Left #1 (Active)   Wound Image   12/02/22 1008   Wound Etiology Surgical 12/12/22 0941   Dressing Status New drainage noted; Old drainage noted 12/12/22 0941   Wound Cleansed Cleansed with saline 12/12/22 0941   Dressing/Treatment Moist to dry;ABD 11/18/22 1640   Dressing Change Due 11/18/22 11/18/22 0000   Wound Length (cm) 3.3 cm 12/12/22 0941   Wound Width (cm) 1.6 cm 12/12/22 0941   Wound Depth (cm) 0.3 cm 12/12/22 0941   Wound Surface Area (cm^2) 5.28 cm^2 12/12/22 0941   Change in Wound Size % (l*w) 67.51 12/12/22 0941   Wound Volume (cm^3) 1.584 cm^3 12/12/22 0941   Wound Healing % 98 12/12/22 0941   Post-Procedure Length (cm) 3.3 cm 12/12/22 0941   Post-Procedure Width (cm) 1.6 cm 12/12/22 0941   Post-Procedure Depth (cm) 0.3 cm 12/12/22 0941   Post-Procedure Surface Area (cm^2) 5.28 cm^2 12/12/22 0941   Post-Procedure Volume (cm^3) 1.584 cm^3 12/12/22 0941   Wound Assessment Pink/red;Slough 12/12/22 0941   Drainage Amount Moderate 12/12/22 0941   Drainage Description Serosanguinous 12/12/22 0941   Odor None 12/12/22 0941   Kavitha-wound Assessment Intact 12/12/22 0941   Margins Defined edges 12/12/22 0941   Wound Thickness Description not for Pressure Injury Full thickness 12/12/22 0941   Number of days: 24          Percent of Wound(s)/Ulcer(s) Debrided: 100%    Total Surface Area Debrided:  5.28 sq cm         Estimated Blood Loss:  Minimal    Hemostasis Achieved:  by pressure    Procedural Pain:  3  / 10     Post Procedural Pain:  2 / 10     Response to treatment:  Well tolerated by patient.        Plan: Treatment Note please see Discharge Instructions    Written patient dismissal instructions given to patient and signed by patient or POA.              Electronically signed by Lucrecia Arechiga MD on 12/12/2022 at 9:54 AM

## 2022-12-13 ENCOUNTER — CARE COORDINATION (OUTPATIENT)
Dept: CARE COORDINATION | Age: 57
End: 2022-12-13

## 2022-12-13 NOTE — CARE COORDINATION
Nutrition Care Coordinator Follow-Up visit:    Food Recall:eating 2-3 meals/d    Activity Level:  Sedentary:X  Lightly Active: Moderately Active:  Very Active:    Adult BMI:  Underweight (below 18.5)  Normal Weight (18.5-24.9)  Overweight (25-29. 9)  Obese (30-39. 9)  Morbidly Obese (>40)X    Weight Change:no change    Plan:  Plan was established with patient:  Increase dietary fiber by consuming whole grains, fruits and vegetables:X  Limit dietary cholesterol to >200mg/day: Increase water intake:  Avoid added sugar:X  Avoid sweetened beverages:X  Choose lean meats:      Monitoring: Will monitor weight:  Will monitor adherence to meal plan: Will monitor adherence to exercise plan: Will monitor HGA1c:X    Handouts Provided :  Low Carb snacking:X  Carb counting /individual meal plan:X  Portion Control:X  Food Labels:X  Physical Activity:  Low Fat/Cholesterol:  Hypo/Hyperglycemia:X  Calorie Controlled Meal Plan:    Goals: Increase water consumption to 8oz. 6-8 times daily:  Manage blood sugars by consuming 3 meals spaced every 4-5 hours with 2-3 snacks daily:discussed  Increase fiber and decrease fat intake by consuming 1-2 fruit servings and 2-3 vegetable servings per day. Increase physical activity by:  Consume less than 2,000mg of sodium/day  Avoid consumption of sweetened beverages and added sugar by reading food labels:discussed  Monitor blood sugars by using meter to check blood glucose before morning meal and 2 hours after a meal daily:patient states no test strips or meter- unable to afford to buy  Decrease risk of coronary heart disease by consuming fish that contains omega-3 fatty acids at least twice a week, avoiding partially hydrogenated oil/trans fats and limiting saturated fat intake by reading food labels:discussed    Patient goals set:  1. Reviewed working on meal patten, currently eating 2meals/d, sometimes skips breakfast or lunch. Reviewed and encouraged set meal times daily.   Jacinto Luther lunch and breakfast suggestions discussed, will send patient a list of suggestions. Goal is 3 meals daily spaced every 4-5 hours. 2. Reviewed what foods contain carbohydrate to limit and how to measure out portions. Encouraged patient to limit carb intake to 6-75gms/meal, 15-30gms/snack. Patient admits snacks often in the evening, discussed low carb snacking options and will send patient lists of low carb snacks. 3. Reviewed plate method, encouraged patient to increase intake of non-starchy vegetables. Goal is 1/2 of  plate to be non-starchy vegetables, 1/4 lean protein, 1/4 carbs. Reviewed making sure he is having a protein sourceswith each meal due to non-healing wound- meats, fish, cottage cheese, peanut butter, nuts/seeds, ect. Foods from each category reviewed along with what a serving size is. 4. Reviewed avoiding/limiting sweets and sweetened drinks. Patient admits he is drinking some pop and flavored creamer in coffee. Reviewed alternatives and encouraged to replace pop with water. Discussed  limiting/avoiding sugary foods- admits to eating sweets at times. Discussed sugar free alternatives to sweets- sugar free jello, pudding, ect. Encouraged patient to keep sweets out of the house as much as possible and limit to once a week or less. Patient states he got nutrition information, has not read through it all yet. He is struggling financially right now, does not have a lot of food in his house- [facility], [employer] to send patient a list of area food zamora. We discussed importance of protein intake for wound healing, encouraged to increase non-starchy vegetables and lean protein sources. Will follow up with patient in 3-4 weeks to review and answer questions.   Sandy Gaytan

## 2022-12-14 ENCOUNTER — CARE COORDINATION (OUTPATIENT)
Dept: CARE COORDINATION | Age: 57
End: 2022-12-14

## 2022-12-14 ENCOUNTER — TRANSCRIBE ORDERS (OUTPATIENT)
Dept: ADMINISTRATIVE | Age: 57
End: 2022-12-14

## 2022-12-14 DIAGNOSIS — R91.1 SOLITARY PULMONARY NODULE: Primary | ICD-10-CM

## 2022-12-15 NOTE — CARE COORDINATION
attempted to contact [address] [place] [address].  left message with name and number.  informed [address] [place] [address] that she mailed a list of local food pantries and to keep a look out for them. Plan:    will attempt to contact [address] [place] [address].  will follow up on food pantry list mailed to him.   [address] [place] [address] will utilize food pantry list.

## 2022-12-15 NOTE — DISCHARGE INSTRUCTIONS
[address] [place] [facility]                            Visit  Discharge Instructions / Physician Orders  DATE: 12/19/2022     Home Care:NONE     SUPPLIES ORDERED THRU:         [employer]            DATE LAST SUPPLIED ordered 12/2/22     Wound Location:  Scrotum     Cleanse with: Liquid antibacterial soap and water, rinse well      Dressing Orders:  Primary dressing      Katie            Secondary dressing    4X4                   secure with           x 30days     Frequency:  Daily     Additional Orders: Increase protein to diet (meat, cheese, eggs, fish, peanut butter, nuts and beans)  Multivitamin daily     OFFLOADING [] YES  TYPE:                  [] NA     Weekly wound care visits until determined otherwise. Antibiotic therapy-wound care related YES [] NO [x] NA[]     MY CHART []     Smart Device  []      HYPERBARIC TREATMENT-                TREATMENT #                          Your next appointment with the [address] [place] [address] is in 1 week                                                                                                   (Please note your next appointment above and if you are unable to keep, kindly give a 24 hour notice. Thank you.)  If more than 15 min late we cannot guarantee you will be seen due to clinician schedule  Per Policy, Excessive cancellation will call for dismissal from program.  If you experience any of the following, please call the [address] [place] [address] during business hours:  [phone]     * Increase in Pain  * Temperature over 101  * Increase in drainage from your wound  * Drainage with a foul odor  * Bleeding  * Increase in swelling  * Need for compression bandage changes due to slippage, breakthrough drainage. If you need medical attention outside of the business hours of the [address] [place] [address] please contact your PCP or go to the nearest emergency room.      The information contained in the After Visit Summary has been reviewed with me, the patient and/or responsible adult, by my health care provider(s). I had the opportunity to ask questions regarding this information.  I have elected to receive;      []After Visit Summary  [x]Comprehensive Discharge Instruction        Patient signature______________________________________Date:________

## 2022-12-15 NOTE — RESULT ENCOUNTER NOTE
Please notify patient: do we have glucometer sample?    I know Nayeli Recinos has the cheapest    Future Appointments  12/19/2022 10:00 AM   Meg Qureshi MD             [facility] WND CAR        [facility]  12/20/2022 11:45 AM   [facility] CT RM 1                [facility] CT SCAN        [facility] Radiolog  1/12/2023  8:00 AM    Ayana Rosario MD     fp sc               [facility] [Negative] : Endocrine

## 2022-12-19 ENCOUNTER — HOSPITAL ENCOUNTER (OUTPATIENT)
Dept: WOUND CARE | Age: 57
Discharge: HOME | End: 2022-12-19

## 2022-12-22 ENCOUNTER — CARE COORDINATION (OUTPATIENT)
Dept: CARE COORDINATION | Age: 57
End: 2022-12-22

## 2022-12-22 NOTE — CARE COORDINATION
attempted to contact [address] [place] [address].  left message inquiring if [address] [place] [address] received list of local food pantries with dates and times.  requested a call back to [phone]. Plan:    will attempt to follow up with emil regarding social needs.

## 2022-12-23 ENCOUNTER — HOSPITAL ENCOUNTER (OUTPATIENT)
Dept: WOUND CARE | Age: 57
Discharge: HOME | End: 2022-12-23

## 2022-12-24 NOTE — DISCHARGE INSTRUCTIONS
[address] [place] [facility]                            Visit  Discharge Instructions / Physician Orders  DATE: 12/30/2022     Home Care:NONE     SUPPLIES ORDERED THRU:         [employer]            DATE LAST SUPPLIED ordered 12/2/22     Wound Location:  Scrotum     Cleanse with: Liquid antibacterial soap and water, rinse well      Dressing Orders:  Primary dressing      Katie            Secondary dressing    4X4                   secure with           x 30days     Frequency:  Daily     Additional Orders: Increase protein to diet (meat, cheese, eggs, fish, peanut butter, nuts and beans)  Multivitamin daily     OFFLOADING [] YES  TYPE:                  [] NA     Weekly wound care visits until determined otherwise. Antibiotic therapy-wound care related YES [] NO [x] NA[]     MY CHART []     Smart Device  []      HYPERBARIC TREATMENT-                TREATMENT #                          Your next appointment with the [address] [place] [address] is in 1 week                                                                                                   (Please note your next appointment above and if you are unable to keep, kindly give a 24 hour notice. Thank you.)  If more than 15 min late we cannot guarantee you will be seen due to clinician schedule  Per Policy, Excessive cancellation will call for dismissal from program.  If you experience any of the following, please call the [address] [place] [address] during business hours:  [phone]     * Increase in Pain  * Temperature over 101  * Increase in drainage from your wound  * Drainage with a foul odor  * Bleeding  * Increase in swelling  * Need for compression bandage changes due to slippage, breakthrough drainage. If you need medical attention outside of the business hours of the [address] [place] [address] please contact your PCP or go to the nearest emergency room.      The information contained in the After Visit Summary has been reviewed with me, the patient and/or responsible adult, by my health care provider(s). I had the opportunity to ask questions regarding this information.  I have elected to receive;      []After Visit Summary  [x]Comprehensive Discharge Instruction        Patient signature______________________________________Date:________  Electronically signed by Jennifer Rashid RN on 12/30/2022 at 9:20 AM  Electronically signed by ELDA Marin CNP on 12/30/2022 at 9:22 AM

## 2022-12-30 ENCOUNTER — HOSPITAL ENCOUNTER (OUTPATIENT)
Dept: CT IMAGING | Age: 57
End: 2022-12-30
Payer: COMMERCIAL

## 2022-12-30 ENCOUNTER — HOSPITAL ENCOUNTER (OUTPATIENT)
Dept: WOUND CARE | Age: 57
Discharge: HOME | End: 2022-12-30
Payer: COMMERCIAL

## 2022-12-30 VITALS
HEART RATE: 92 BPM | DIASTOLIC BLOOD PRESSURE: 98 MMHG | SYSTOLIC BLOOD PRESSURE: 170 MMHG | TEMPERATURE: 96.9 F | RESPIRATION RATE: 16 BRPM

## 2022-12-30 DIAGNOSIS — R91.1 SOLITARY PULMONARY NODULE: ICD-10-CM

## 2022-12-30 DIAGNOSIS — F17.200 CURRENT SMOKER: ICD-10-CM

## 2022-12-30 DIAGNOSIS — S31.109A NONHEALING OPEN WOUND OF GROIN: Primary | ICD-10-CM

## 2022-12-30 PROBLEM — L03.90 WOUND CELLULITIS: Status: RESOLVED | Noted: 2018-08-27 | Resolved: 2022-12-30

## 2022-12-30 PROCEDURE — 11042 DBRDMT SUBQ TIS 1ST 20SQCM/<: CPT | Performed by: NURSE PRACTITIONER

## 2022-12-30 PROCEDURE — 71250 CT THORAX DX C-: CPT

## 2022-12-30 PROCEDURE — 11042 DBRDMT SUBQ TIS 1ST 20SQCM/<: CPT

## 2022-12-30 RX ORDER — LIDOCAINE 40 MG/G
CREAM TOPICAL ONCE
Status: CANCELLED | OUTPATIENT
Start: 2022-12-30 | End: 2022-12-30

## 2022-12-30 RX ORDER — GINSENG 100 MG
CAPSULE ORAL ONCE
Status: CANCELLED | OUTPATIENT
Start: 2022-12-30 | End: 2022-12-30

## 2022-12-30 RX ORDER — LIDOCAINE HYDROCHLORIDE 40 MG/ML
SOLUTION TOPICAL ONCE
OUTPATIENT
Start: 2022-12-30 | End: 2022-12-30

## 2022-12-30 RX ORDER — GENTAMICIN SULFATE 1 MG/G
OINTMENT TOPICAL ONCE
Status: CANCELLED | OUTPATIENT
Start: 2022-12-30 | End: 2022-12-30

## 2022-12-30 RX ORDER — BACITRACIN, NEOMYCIN, POLYMYXIN B 400; 3.5; 5 [USP'U]/G; MG/G; [USP'U]/G
OINTMENT TOPICAL ONCE
Status: CANCELLED | OUTPATIENT
Start: 2022-12-30 | End: 2022-12-30

## 2022-12-30 RX ORDER — LIDOCAINE HYDROCHLORIDE 40 MG/ML
SOLUTION TOPICAL ONCE
Status: COMPLETED | OUTPATIENT
Start: 2022-12-30 | End: 2022-12-30

## 2022-12-30 RX ORDER — LIDOCAINE 50 MG/G
OINTMENT TOPICAL ONCE
Status: CANCELLED | OUTPATIENT
Start: 2022-12-30 | End: 2022-12-30

## 2022-12-30 RX ORDER — CLOBETASOL PROPIONATE 0.5 MG/G
OINTMENT TOPICAL ONCE
Status: CANCELLED | OUTPATIENT
Start: 2022-12-30 | End: 2022-12-30

## 2022-12-30 RX ORDER — BACITRACIN ZINC AND POLYMYXIN B SULFATE 500; 1000 [USP'U]/G; [USP'U]/G
OINTMENT TOPICAL ONCE
Status: CANCELLED | OUTPATIENT
Start: 2022-12-30 | End: 2022-12-30

## 2022-12-30 RX ORDER — BETAMETHASONE DIPROPIONATE 0.05 %
OINTMENT (GRAM) TOPICAL ONCE
Status: CANCELLED | OUTPATIENT
Start: 2022-12-30 | End: 2022-12-30

## 2022-12-30 RX ORDER — LIDOCAINE HYDROCHLORIDE 20 MG/ML
JELLY TOPICAL ONCE
OUTPATIENT
Start: 2022-12-30 | End: 2022-12-30

## 2022-12-30 RX ADMIN — LIDOCAINE HYDROCHLORIDE 5 ML: 40 SOLUTION TOPICAL at 09:18

## 2022-12-30 ASSESSMENT — ENCOUNTER SYMPTOMS
VOMITING: 0
DIARRHEA: 0
NAUSEA: 0
COUGH: 0
RHINORRHEA: 0
SHORTNESS OF BREATH: 0

## 2022-12-30 NOTE — PROGRESS NOTES
[address]   Progress Note and Procedure Note      Casper Garcia  MEDICAL RECORD NUMBER:  [id number]  AGE: 65 y.o. GENDER: male  : 1965  EPISODE DATE:  2022    Subjective:     Chief Complaint   Patient presents with    Wound Check     scrotum         HISTORY of PRESENT ILLNESS HPI     Casper Garcia is a 65 y.o. male who presents today for wound/ulcer evaluation. History of Wound Context: here to follow up on left sided scrotal wound that is healing well. No signs or symptoms of infection. Wound/Ulcer Pain Timing/Severity: none  Quality of pain: N/A  Severity:  0 / 10   Modifying Factors: None  Associated Signs/Symptoms: none    Ulcer Identification:  Ulcer Type: non-healing surgical  Contributing Factors: diabetes, poor glucose control, obesity, and smoking         PAST MEDICAL HISTORY        Diagnosis Date    Anesthesia     NEVER HAD, FEARFUL HE'LL WAKE UP DISORIENTED & HURT SOMEONE. Benign hypertension with CKD (chronic kidney disease), stage II 10/6/2014    Diabetes mellitus ([facility] [address]     Hypertension     Sleep apnea     CPAP MACHINE AT NIGHT.     Umbilical hernia     Wears dentures     UPPER        PAST SURGICAL HISTORY    Past Surgical History:   Procedure Laterality Date    SCROTAL SURGERY N/A 2022    EXPLORATION OF SCROTUM, PERINEUM, LEFT INGUINAL REGION performed by Cristina Bear MD at [address] [place] [address]  2022    INCISION AND DRAINAGE WITH WASHOUT performed by Cristina Bear MD at [address] [place] [address]  16       FAMILY HISTORY    Family History   Problem Relation Age of Onset    Diabetes Father     High Blood Pressure Father        SOCIAL HISTORY    Social History     Tobacco Use    Smoking status: Every Day     Packs/day: 2.00     Years: 15.00     Pack years: 30.00     Types: Cigarettes    Smokeless tobacco: Never   Vaping Use    Vaping Use: Never used   Substance Use Topics    Alcohol use: No    Drug use: No       ALLERGIES    Allergies   Allergen Reactions    Januvia [Sitagliptin] Other (See Comments)     Constipation       MEDICATIONS    Current Outpatient Medications on File Prior to Encounter   Medication Sig Dispense Refill    varenicline (CHANTIX STARTING MONTH JENNIE) 0.5 MG X 11 & 1 MG X 42 tablet 0.5 mg po daily x 3 days, 0.5 mg BID x 4 days, then 1 mg BID thereafter . Call for refill (Patient taking differently: 0.5 mg po daily x 3 days, 0.5 mg BID x 4 days, then 1 mg BID thereafter . Call for refill    Patient says he can't afford) 53 each 0    Semaglutide 3 MG TABS Take 3 mg by mouth daily 1st step 30 tablet 0    blood glucose monitor strips Testing twice a day. Please dispense according to patients insurance.  200 strip 3    hydroCHLOROthiazide (HYDRODIURIL) 25 MG tablet Take 1 tablet by mouth once daily 90 tablet 0    atorvastatin (LIPITOR) 20 MG tablet Take 1 tablet by mouth once daily 90 tablet 0    glipiZIDE (GLUCOTROL) 10 MG tablet Take 1 tablet by mouth every morning (before breakfast) 60 tablet 0    lisinopril (PRINIVIL;ZESTRIL) 5 MG tablet Take 1 tablet by mouth once daily 90 tablet 2    metFORMIN (GLUCOPHAGE) 1000 MG tablet TAKE 1 TABLET BY MOUTH TWICE DAILY WITH MEALS 180 tablet 2    albuterol sulfate HFA (PROVENTIL;VENTOLIN;PROAIR) 108 (90 Base) MCG/ACT inhaler Inhale 2 puffs into the lungs every 6 hours as needed for Wheezing 9 each 0    acetaminophen (TYLENOL) 500 MG tablet Take 1 tablet by mouth every 6 hours as needed for Pain (Take 1-2 tablets every 6 hours) 120 tablet 1    aspirin 81 MG chewable tablet Take 1 tablet by mouth daily 90 tablet 5    [DISCONTINUED] glimepiride (AMARYL) 4 MG tablet Take 1 tablet by mouth 2 times daily 60 tablet 0    [DISCONTINUED] lisinopril (PRINIVIL;ZESTRIL) 5 MG tablet Take 1 tablet by mouth daily 30 tablet 2    Multiple Vitamins-Minerals (THERAPEUTIC MULTIVITAMIN-MINERALS) tablet Take 1 tablet by mouth daily      b complex vitamins capsule Take 1 capsule by mouth daily       No current facility-administered medications on file prior to encounter. REVIEW OF SYSTEMS    Review of Systems   Constitutional:  Negative for chills, fatigue and fever. HENT:  Negative for congestion and rhinorrhea. Respiratory:  Negative for cough and shortness of breath. Chronic cough - smoker   Cardiovascular:  Negative for chest pain and leg swelling. Gastrointestinal:  Negative for diarrhea, nausea and vomiting. Musculoskeletal:  Negative for gait problem. Skin:  Positive for wound (left side of scrotum). Allergic/Immunologic: Negative for immunocompromised state. Neurological:  Negative for dizziness, syncope and weakness. Psychiatric/Behavioral:  Negative for agitation. The patient is not nervous/anxious. Objective:      BP (!) 170/98   Pulse 92   Temp 96.9 °F (36.1 °C)   Resp 16     Wt Readings from Last 3 Encounters:   12/12/22 300 lb (136.1 kg)   12/02/22 300 lb (136.1 kg)   11/22/22 300 lb (136.1 kg)       PHYSICAL EXAM    General Appearance: alert and oriented to person, place and time, well-developed and obese, in no acute distress  Skin: warm and dry, no rash or erythema, left sided scrotal wound   Head: normocephalic and atraumatic  Eyes: pupils equal, round and conjunctivae normal  Pulmonary/Chest: normal air movement, no respiratory distress  Extremities: no cyanosis and no clubbing or edema   Musculoskeletal: no joint swelling, deformity or tenderness  Neurologic: gait, coordination normal and speech normal      Assessment:     Problem List Items Addressed This Visit       Nonhealing open wound of groin - Primary    Relevant Orders    Initiate Outpatient Wound Care Protocol    Current smoker        Procedure Note  Indications:  Based on my examination of this patient's wound(s)/ulcer(s) today, debridement is required to promote healing and evaluate the wound base.     Performed by: ELDA Goodwin - CNP    Consent obtained: Yes    Time out taken:  Yes    Pain Control: Anesthetic  Anesthetic: 4% Lidocaine Liquid Topical     Debridement:Excisional Debridement    Using curette the wound(s)/ulcer(s) was/were sharply debrided down through and including the removal of subcutaneous tissue.         Devitalized Tissue Debrided:  biofilm and slough    Pre Debridement Measurements:  Are located in the Wound/Ulcer Documentation Flow Sheet    Wound/Ulcer #: 1    Post Debridement Measurements:  Wound/Ulcer Descriptions are Pre Debridement except measurements:    Wound 11/17/22 Scrotum Left #1 (Active)   Wound Image   12/30/22 0904   Wound Etiology Surgical 12/30/22 0904   Dressing Status Old drainage noted;New drainage noted 12/30/22 0904   Wound Cleansed Cleansed with saline 12/30/22 0904   Dressing/Treatment Other (comment) 12/12/22 1000   Wound Length (cm) 0.6 cm 12/30/22 0904   Wound Width (cm) 0.4 cm 12/30/22 0904   Wound Depth (cm) 0.1 cm 12/30/22 0904   Wound Surface Area (cm^2) 0.24 cm^2 12/30/22 0904   Change in Wound Size % (l*w) 98.52 12/30/22 0904   Wound Volume (cm^3) 0.024 cm^3 12/30/22 0904   Wound Healing % 100 12/30/22 0904   Post-Procedure Length (cm) 0.6 cm 12/30/22 0904   Post-Procedure Width (cm) 0.4 cm 12/30/22 0904   Post-Procedure Depth (cm) 0.1 cm 12/30/22 0904   Post-Procedure Surface Area (cm^2) 0.24 cm^2 12/30/22 0904   Post-Procedure Volume (cm^3) 0.024 cm^3 12/30/22 0904   Wound Assessment Pink/red 12/30/22 0904   Drainage Amount Moderate 12/30/22 0904   Drainage Description Serosanguinous 12/30/22 0904   Odor None 12/30/22 0904   Kavitha-wound Assessment Intact 12/30/22 0904   Margins Defined edges 12/30/22 0904   Wound Thickness Description not for Pressure Injury Full thickness 12/30/22 0904   Number of days: 42          Percent of Wound(s)/Ulcer(s) Debrided: 100%    Total Surface Area Debrided:  0.24 sq cm     Estimated Blood Loss:  Minimal    Hemostasis Achieved:  by pressure    Procedural Pain:  0  / 10     Post Procedural Pain:  0 / 10     Response to treatment:  Well tolerated by patient. Plan:     Treatment Note please see Discharge Instructions    Written patient dismissal instructions given to patient and signed by patient or POA.            Electronically signed by ELDA Encinas CNP on 12/30/2022 at 9:26 AM

## 2023-01-03 ENCOUNTER — CARE COORDINATION (OUTPATIENT)
Dept: CARE COORDINATION | Age: 58
End: 2023-01-03

## 2023-01-04 NOTE — CARE COORDINATION
Attempted to reach patient for nutrition care coordination  Follow up. LVM requesting return call to [phone]. Will  Attempt to reach again within 1-2 weeks.   MALINA Oconnor

## 2023-01-04 NOTE — CARE COORDINATION
attempted to contact [address] [place] [address].  received VM.  did not leave a VM.  will make one more attempt to contact [address] [place] [address] before signing off. Plan:    will make one more attempt to contact [address] [place] [address] regarding food insecurities.

## 2023-01-08 NOTE — DISCHARGE INSTRUCTIONS
[facility] [place] [facility]                            Visit  Discharge Instructions / Physician Orders  DATE: 1/9/23     Home Care:NONE     SUPPLIES ORDERED THRU:         [employer]            DATE LAST SUPPLIED ordered 12/2/22     Wound Location:  Scrotum     Cleanse with: Liquid antibacterial soap and water, rinse well      Dressing Orders:  Primary dressing      Katie            Secondary dressing    4X4                   secure with           x 30days     Frequency:  Daily     Additional Orders: Increase protein to diet (meat, cheese, eggs, fish, peanut butter, nuts and beans)  Multivitamin daily     OFFLOADING [] YES  TYPE:                  [] NA     Weekly wound care visits until determined otherwise. Antibiotic therapy-wound care related YES [] NO [x] NA[]     MY CHART []     Smart Device  []      HYPERBARIC TREATMENT-                TREATMENT #                          Your next appointment with the [address] [place] [address] is in 1 week                                                                                                   (Please note your next appointment above and if you are unable to keep, kindly give a 24 hour notice. Thank you.)  If more than 15 min late we cannot guarantee you will be seen due to clinician schedule  Per Policy, Excessive cancellation will call for dismissal from program.  If you experience any of the following, please call the [address] [place] [address] during business hours:  [phone]     * Increase in Pain  * Temperature over 101  * Increase in drainage from your wound  * Drainage with a foul odor  * Bleeding  * Increase in swelling  * Need for compression bandage changes due to slippage, breakthrough drainage. If you need medical attention outside of the business hours of the [address] [place] [address] please contact your PCP or go to the nearest emergency room.      The information contained in the After Visit Summary has been reviewed with me, the patient and/or responsible adult, by my health care provider(s). I had the opportunity to ask questions regarding this information.  I have elected to receive;      []After Visit Summary  [x]Comprehensive Discharge Instruction        Patient signature______________________________________Date:________

## 2023-01-09 ENCOUNTER — HOSPITAL ENCOUNTER (OUTPATIENT)
Dept: WOUND CARE | Age: 58
Discharge: HOME | End: 2023-01-09

## 2023-01-09 ENCOUNTER — TELEPHONE (OUTPATIENT)
Dept: WOUND CARE | Age: 58
End: 2023-01-09

## 2023-01-09 NOTE — TELEPHONE ENCOUNTER
Patient calling and canceling his [facility] wound care appointment stating that he overslept and was rescheduled for 1-13-23. Left ventricular thrombus with acute MI

## 2023-01-11 NOTE — DISCHARGE INSTRUCTIONS
[address] [facility]                            Visit  Discharge Instructions / Physician Orders  DATE: 1/13/23     Home Care:NONE     SUPPLIES ORDERED THRU:         [employer]            DATE LAST SUPPLIED ordered 12/2/22     Wound Location:  Scrotum     Cleanse with: Liquid antibacterial soap and water, rinse well      Dressing Orders:  Primary dressing      Katie            Secondary dressing    4X4                   secure with           x 30days     Frequency:  Daily     Additional Orders: Increase protein to diet (meat, cheese, eggs, fish, peanut butter, nuts and beans)  Multivitamin daily  Lotrimin Cream for yeast infection   Referral to [address] [place] [address]- Dermatology for Hidradinitis  OFFLOADING [] YES  TYPE:                  [] NA     Weekly wound care visits until determined otherwise. Antibiotic therapy-wound care related YES [] NO [x] NA[]     MY CHART []     Smart Device  []      HYPERBARIC TREATMENT-                TREATMENT #                          Your next appointment with the [address] [place] [address] is in 1 week                                                                                                   (Please note your next appointment above and if you are unable to keep, kindly give a 24 hour notice. Thank you.)  If more than 15 min late we cannot guarantee you will be seen due to clinician schedule  Per Policy, Excessive cancellation will call for dismissal from program.  If you experience any of the following, please call the [address] [place] [address] during business hours:  [phone]     * Increase in Pain  * Temperature over 101  * Increase in drainage from your wound  * Drainage with a foul odor  * Bleeding  * Increase in swelling  * Need for compression bandage changes due to slippage, breakthrough drainage.      If you need medical attention outside of the business hours of the [address] [place] [address] please contact your PCP or go to the nearest emergency room. The information contained in the After Visit Summary has been reviewed with me, the patient and/or responsible adult, by my health care provider(s). I had the opportunity to ask questions regarding this information.  I have elected to receive;      []After Visit Summary  [x]Comprehensive Discharge Instruction        Patient signature______________________________________Date:________  Electronically signed by Leonardo Pena RN on 1/13/2023 at 10:32 AM  Electronically signed by Celeste Myers MD on 1/13/2023 at 10:39 AM

## 2023-01-13 ENCOUNTER — HOSPITAL ENCOUNTER (OUTPATIENT)
Dept: WOUND CARE | Age: 58
Discharge: HOME | End: 2023-01-13
Payer: COMMERCIAL

## 2023-01-13 VITALS
HEART RATE: 97 BPM | TEMPERATURE: 97 F | RESPIRATION RATE: 18 BRPM | WEIGHT: 300 LBS | SYSTOLIC BLOOD PRESSURE: 131 MMHG | BODY MASS INDEX: 40.69 KG/M2 | DIASTOLIC BLOOD PRESSURE: 67 MMHG

## 2023-01-13 DIAGNOSIS — L73.2 HIDRADENITIS SUPPURATIVA: ICD-10-CM

## 2023-01-13 DIAGNOSIS — S31.109A NONHEALING OPEN WOUND OF GROIN: Primary | ICD-10-CM

## 2023-01-13 DIAGNOSIS — B37.49 CANDIDIASIS OF PERINEUM: ICD-10-CM

## 2023-01-13 DIAGNOSIS — Z87.891 PERSONAL HISTORY OF SMOKING: ICD-10-CM

## 2023-01-13 PROCEDURE — 11042 DBRDMT SUBQ TIS 1ST 20SQCM/<: CPT

## 2023-01-13 RX ORDER — LIDOCAINE HYDROCHLORIDE 40 MG/ML
SOLUTION TOPICAL ONCE
Status: COMPLETED | OUTPATIENT
Start: 2023-01-13 | End: 2023-01-13

## 2023-01-13 RX ORDER — LIDOCAINE HYDROCHLORIDE 40 MG/ML
SOLUTION TOPICAL ONCE
OUTPATIENT
Start: 2023-01-13 | End: 2023-01-13

## 2023-01-13 RX ORDER — CLOTRIMAZOLE 1 %
CREAM (GRAM) TOPICAL
Qty: 45 G | Refills: 1 | Status: SHIPPED | OUTPATIENT
Start: 2023-01-13 | End: 2023-01-20

## 2023-01-13 RX ORDER — LIDOCAINE HYDROCHLORIDE 20 MG/ML
JELLY TOPICAL ONCE
OUTPATIENT
Start: 2023-01-13 | End: 2023-01-13

## 2023-01-13 RX ADMIN — LIDOCAINE HYDROCHLORIDE 5 ML: 40 SOLUTION TOPICAL at 09:56

## 2023-01-13 ASSESSMENT — PAIN SCALES - GENERAL: PAINLEVEL_OUTOF10: 0

## 2023-01-13 NOTE — PROGRESS NOTES
[address]   Progress Note and Procedure Note      Carlee Serrato  MEDICAL RECORD NUMBER:  [id number]  AGE: 65 y.o. GENDER: male  : 1965  EPISODE DATE:  2023    Subjective:     Chief Complaint   Patient presents with    Wound Check     Left scrotum         HISTORY of PRESENT ILLNESS HPI     Carlee Serrato is a 65 y.o. male who presents today for wound/ulcer evaluation. History of Wound Context: The patient Is here for left scrotum wound that is healing with no purulent drainage. There is erythematous rash with satellite lesions to the perineum area. He also had several hydradenitis suppurativa lesions to the groin and axillary. He was hospitalized recently at [facility] 1622 through 2022 for scrotal abscess status post perianal/scrotal exploration, incision and debridement for scrotal abscess. The patient was treated with IV antibiotics during his hospitalization was discharged on oral Augmentin and Zyvox that was completed. 2022 scrotal abscess culture grew group C and group B streptococcus. Wound/Ulcer Pain Timing/Severity: intermittent  Quality of pain: dull  Severity:  2 / 10   Modifying Factors: None  Associated Signs/Symptoms: none    Ulcer Identification:  Ulcer Type: non-healing surgical  Contributing Factors: diabetes, poor glucose control, chronic pressure, and obesity    Wound: N/A        PAST MEDICAL HISTORY        Diagnosis Date    Anesthesia     NEVER HAD, FEARFUL HE'LL WAKE UP DISORIENTED & HURT SOMEONE. Benign hypertension with CKD (chronic kidney disease), stage II 10/6/2014    Diabetes mellitus ([facility] [address]     Hypertension     Sleep apnea     CPAP MACHINE AT NIGHT.     Umbilical hernia     Wears dentures     UPPER        PAST SURGICAL HISTORY    Past Surgical History:   Procedure Laterality Date    SCROTAL SURGERY N/A 2022    EXPLORATION OF SCROTUM, PERINEUM, LEFT INGUINAL REGION performed by Pastor Cuello Sveta Louis MD at [address] [place] [address]  11/16/2022    INCISION AND DRAINAGE WITH WASHOUT performed by Fredi Dahl MD at [address] [place] [address]  5/24/16       FAMILY HISTORY    Family History   Problem Relation Age of Onset    Diabetes Father     High Blood Pressure Father        SOCIAL HISTORY    Social History     Tobacco Use    Smoking status: Every Day     Packs/day: 2.00     Years: 15.00     Pack years: 30.00     Types: Cigarettes    Smokeless tobacco: Never   Vaping Use    Vaping Use: Never used   Substance Use Topics    Alcohol use: No    Drug use: No       ALLERGIES    Allergies   Allergen Reactions    Januvia [Sitagliptin] Other (See Comments)     Constipation       MEDICATIONS    Current Outpatient Medications on File Prior to Encounter   Medication Sig Dispense Refill    varenicline (CHANTIX STARTING MONTH PAK) 0.5 MG X 11 & 1 MG X 42 tablet 0.5 mg po daily x 3 days, 0.5 mg BID x 4 days, then 1 mg BID thereafter . Call for refill (Patient taking differently: 0.5 mg po daily x 3 days, 0.5 mg BID x 4 days, then 1 mg BID thereafter . Call for refill    Patient says he can't afford) 53 each 0    Semaglutide 3 MG TABS Take 3 mg by mouth daily 1st step 30 tablet 0    blood glucose monitor strips Testing twice a day. Please dispense according to patients insurance.  200 strip 3    hydroCHLOROthiazide (HYDRODIURIL) 25 MG tablet Take 1 tablet by mouth once daily 90 tablet 0    atorvastatin (LIPITOR) 20 MG tablet Take 1 tablet by mouth once daily 90 tablet 0    glipiZIDE (GLUCOTROL) 10 MG tablet Take 1 tablet by mouth every morning (before breakfast) 60 tablet 0    lisinopril (PRINIVIL;ZESTRIL) 5 MG tablet Take 1 tablet by mouth once daily 90 tablet 2    metFORMIN (GLUCOPHAGE) 1000 MG tablet TAKE 1 TABLET BY MOUTH TWICE DAILY WITH MEALS 180 tablet 2    albuterol sulfate HFA (PROVENTIL;VENTOLIN;PROAIR) 108 (90 Base) MCG/ACT inhaler Inhale 2 puffs into the lungs every 6 hours as needed for Wheezing 9 each 0    acetaminophen (TYLENOL) 500 MG tablet Take 1 tablet by mouth every 6 hours as needed for Pain (Take 1-2 tablets every 6 hours) 120 tablet 1    aspirin 81 MG chewable tablet Take 1 tablet by mouth daily 90 tablet 5    [DISCONTINUED] glimepiride (AMARYL) 4 MG tablet Take 1 tablet by mouth 2 times daily 60 tablet 0    [DISCONTINUED] lisinopril (PRINIVIL;ZESTRIL) 5 MG tablet Take 1 tablet by mouth daily 30 tablet 2    Multiple Vitamins-Minerals (THERAPEUTIC MULTIVITAMIN-MINERALS) tablet Take 1 tablet by mouth daily      b complex vitamins capsule Take 1 capsule by mouth daily       No current facility-administered medications on file prior to encounter. REVIEW OF SYSTEMS    Pertinent items are noted in HPI.     Objective:      /67   Pulse 97   Temp 97 °F (36.1 °C) (Tympanic)   Resp 18   Wt 300 lb (136.1 kg)   BMI 40.69 kg/m²     Wt Readings from Last 3 Encounters:   01/13/23 300 lb (136.1 kg)   12/12/22 300 lb (136.1 kg)   12/02/22 300 lb (136.1 kg)       PHYSICAL EXAM    General Appearance: alert and oriented to person, place and time, well developed and well- nourished, in no acute distress  Skin: warm and dry, no rash or erythema  Head: normocephalic and atraumatic  Eyes: pupils equal, round, and reactive to light, conjunctivae normal  Neck: supple and non-tender   Abdomen: soft, non-distended  Extremities: no cyanosis, clubbing or edema  Neurologic:  no cranial nerve deficit,  coordination and speech normal      Assessment:     Problem List Items Addressed This Visit       Nonhealing open wound of groin - Primary    Relevant Orders    Initiate Outpatient Wound Care Protocol    Hidradenitis suppurativa    Relevant Orders    Aggie Arroyo MD, Dermatology, [facility]    Personal history of smoking    Relevant Orders    Initiate Outpatient Wound Care Protocol    Candidiasis of perineum        Procedure Note  Indications:  Based on my examination of this patient's wound(s)/ulcer(s) today, debridement is required to promote healing and evaluate the wound base. Performed by: Oly Tang MD    Consent obtained:  Yes    Time out taken:  Yes    Pain Control: Anesthetic  Anesthetic: 4% Lidocaine Liquid Topical       Debridement:Excisional Debridement    Using curette the wound(s)/ulcer(s) was/were sharply debrided down through and including the removal of subcutaneous tissue and muscle/fascia.         Devitalized Tissue Debrided:  fibrin, biofilm, and slough    Pre Debridement Measurements:  Are located in the [place]  Documentation Flow Sheet    Wound/Ulcer #: 1    Post Debridement Measurements:  Wound/Ulcer Descriptions are Pre Debridement except measurements:        Wound 11/17/22 Scrotum Left #1 (Active)   Wound Image   01/13/23 0956   Wound Etiology Surgical 01/13/23 0956   Dressing Status Old drainage noted;New drainage noted 01/13/23 0956   Wound Cleansed Cleansed with saline 01/13/23 0956   Dressing/Treatment Other (comment) 12/12/22 1000   Wound Length (cm) 0.5 cm 01/13/23 0956   Wound Width (cm) 0.5 cm 01/13/23 0956   Wound Depth (cm) 0.1 cm 01/13/23 0956   Wound Surface Area (cm^2) 0.25 cm^2 01/13/23 0956   Change in Wound Size % (l*w) 98.46 01/13/23 0956   Wound Volume (cm^3) 0.025 cm^3 01/13/23 0956   Wound Healing % 100 01/13/23 0956   Post-Procedure Length (cm) 0.5 cm 01/13/23 0956   Post-Procedure Width (cm) 0.5 cm 01/13/23 0956   Post-Procedure Depth (cm) 0.1 cm 01/13/23 0956   Post-Procedure Surface Area (cm^2) 0.25 cm^2 01/13/23 0956   Post-Procedure Volume (cm^3) 0.025 cm^3 01/13/23 0956   Wound Assessment Pink/red 01/13/23 0956   Drainage Amount Moderate 01/13/23 0956   Drainage Description Serosanguinous 01/13/23 0956   Odor None 01/13/23 0956   Kavitha-wound Assessment Intact 01/13/23 0956   Margins Defined edges 01/13/23 0956   Wound Thickness Description not for Pressure Injury Full thickness 01/13/23 0956   Number of days: 56          Percent of Wound(s)/Ulcer(s) Debrided: 100%    Total Surface Area Debrided: 0.2 sq cm         Estimated Blood Loss:  Minimal    Hemostasis Achieved:  by pressure    Procedural Pain:  3  / 10     Post Procedural Pain:  2 / 10     Response to treatment:  Well tolerated by patient. Plan:   Lotrimin cream to the perineum area. Follow-up with dermatology for hidradenitis suppurativa. Treatment Note please see Discharge Instructions    Written patient dismissal instructions given to patient and signed by patient or POA.              Electronically signed by Tala Guzman MD on 1/13/2023 at 10:37 AM

## 2023-01-16 ENCOUNTER — CARE COORDINATION (OUTPATIENT)
Dept: CARE COORDINATION | Age: 58
End: 2023-01-16

## 2023-01-17 ENCOUNTER — CARE COORDINATION (OUTPATIENT)
Dept: CARE COORDINATION | Age: 58
End: 2023-01-17

## 2023-01-18 NOTE — CARE COORDINATION
Attempted to reach patient for nutrition care coordination  Follow up. LVM requesting return call to [phone]. Will  Attempt to reach again within 1-2 weeks.   MALINA Oconnor

## 2023-01-18 NOTE — CARE COORDINATION
attempted to contact [address] [place] [address].  did not leave a message.  has made multiple attempts to contact [address] [place] [address] with no answer or call back. Plan:    will attempt to follow up with [address] [place] [address] 1 more time before signing off.

## 2023-01-20 ENCOUNTER — HOSPITAL ENCOUNTER (OUTPATIENT)
Dept: WOUND CARE | Age: 58
Discharge: HOME | End: 2023-01-20
Payer: COMMERCIAL

## 2023-01-20 VITALS
SYSTOLIC BLOOD PRESSURE: 158 MMHG | TEMPERATURE: 97 F | RESPIRATION RATE: 16 BRPM | HEART RATE: 90 BPM | DIASTOLIC BLOOD PRESSURE: 82 MMHG

## 2023-01-20 DIAGNOSIS — S31.109A NONHEALING OPEN WOUND OF GROIN: Primary | ICD-10-CM

## 2023-01-20 DIAGNOSIS — Z87.891 PERSONAL HISTORY OF SMOKING: ICD-10-CM

## 2023-01-20 PROCEDURE — 11042 DBRDMT SUBQ TIS 1ST 20SQCM/<: CPT

## 2023-01-20 RX ORDER — LIDOCAINE HYDROCHLORIDE 20 MG/ML
JELLY TOPICAL ONCE
OUTPATIENT
Start: 2023-01-20 | End: 2023-01-20

## 2023-01-20 RX ORDER — LIDOCAINE HYDROCHLORIDE 20 MG/ML
JELLY TOPICAL ONCE
Status: DISCONTINUED | OUTPATIENT
Start: 2023-01-20 | End: 2023-01-21 | Stop reason: HOSPADM

## 2023-01-20 RX ORDER — LIDOCAINE HYDROCHLORIDE 40 MG/ML
SOLUTION TOPICAL ONCE
Status: COMPLETED | OUTPATIENT
Start: 2023-01-20 | End: 2023-01-20

## 2023-01-20 RX ORDER — LIDOCAINE HYDROCHLORIDE 40 MG/ML
SOLUTION TOPICAL ONCE
OUTPATIENT
Start: 2023-01-20 | End: 2023-01-20

## 2023-01-20 RX ADMIN — LIDOCAINE HYDROCHLORIDE 5 ML: 40 SOLUTION TOPICAL at 09:36

## 2023-01-20 NOTE — PROGRESS NOTES
[address]   Progress Note and Procedure Note      Radha Genao  MEDICAL RECORD NUMBER:  [id number]  AGE: 65 y.o. GENDER: male  : 1965  EPISODE DATE:  2023    Subjective:     Chief Complaint   Patient presents with    Wound Check     scrotum         HISTORY of PRESENT ILLNESS HPI     Radha Genao is a 65 y.o. male who presents today for wound/ulcer evaluation. History of Wound Context: The patient Is here for left scrotum wound that is healing with no purulent drainage. There is erythematous rash with satellite lesions to the perineum area that is improving. He also had several hydradenitis suppurativa lesions to the groin and axillary. He denied fever or chills, no nausea or vomiting, no other complaints  He was hospitalized recently at [facility] 1622 through 2022 for scrotal abscess status post perianal/scrotal exploration, incision and debridement for scrotal abscess. The patient was treated with IV antibiotics during his hospitalization was discharged on oral Augmentin and Zyvox that was completed. 2022 scrotal abscess culture grew group C and group B streptococcus. Wound/Ulcer Pain Timing/Severity: intermittent  Quality of pain: dull  Severity:  2 / 10   Modifying Factors: None  Associated Signs/Symptoms: none    Ulcer Identification:  Ulcer Type: non-healing surgical  Contributing Factors: diabetes, poor glucose control, chronic pressure, and obesity    Wound: N/A        PAST MEDICAL HISTORY        Diagnosis Date    Anesthesia     NEVER HAD, FEARFUL HE'LL WAKE UP DISORIENTED & HURT SOMEONE. Benign hypertension with CKD (chronic kidney disease), stage II 10/6/2014    Diabetes mellitus ([facility] [address]     Hypertension     Sleep apnea     CPAP MACHINE AT NIGHT.     Umbilical hernia     Wears dentures     UPPER        PAST SURGICAL HISTORY    Past Surgical History:   Procedure Laterality Date    SCROTAL SURGERY N/A 2022 EXPLORATION OF SCROTUM, PERINEUM, LEFT INGUINAL REGION performed by Angleica Gonzalez MD at [facility]  11/16/2022    INCISION AND DRAINAGE WITH WASHOUT performed by Angelica Gonzalez MD at [address] [place] [address]  5/24/16       FAMILY HISTORY    Family History   Problem Relation Age of Onset    Diabetes Father     High Blood Pressure Father        SOCIAL HISTORY    Social History     Tobacco Use    Smoking status: Every Day     Packs/day: 2.00     Years: 15.00     Pack years: 30.00     Types: Cigarettes    Smokeless tobacco: Never   Vaping Use    Vaping Use: Never used   Substance Use Topics    Alcohol use: No    Drug use: No       ALLERGIES    Allergies   Allergen Reactions    Januvia [Sitagliptin] Other (See Comments)     Constipation       MEDICATIONS    Current Outpatient Medications on File Prior to Encounter   Medication Sig Dispense Refill    clotrimazole (LOTRIMIN AF) 1 % cream Apply topically 2 times daily. 45 g 1    varenicline (CHANTIX STARTING MONTH PAK) 0.5 MG X 11 & 1 MG X 42 tablet 0.5 mg po daily x 3 days, 0.5 mg BID x 4 days, then 1 mg BID thereafter . Call for refill (Patient taking differently: 0.5 mg po daily x 3 days, 0.5 mg BID x 4 days, then 1 mg BID thereafter . Call for refill    Patient says he can't afford) 53 each 0    Semaglutide 3 MG TABS Take 3 mg by mouth daily 1st step 30 tablet 0    blood glucose monitor strips Testing twice a day. Please dispense according to patients insurance.  200 strip 3    hydroCHLOROthiazide (HYDRODIURIL) 25 MG tablet Take 1 tablet by mouth once daily 90 tablet 0    atorvastatin (LIPITOR) 20 MG tablet Take 1 tablet by mouth once daily 90 tablet 0    glipiZIDE (GLUCOTROL) 10 MG tablet Take 1 tablet by mouth every morning (before breakfast) 60 tablet 0    lisinopril (PRINIVIL;ZESTRIL) 5 MG tablet Take 1 tablet by mouth once daily 90 tablet 2    metFORMIN (GLUCOPHAGE) 1000 MG tablet TAKE 1 TABLET BY MOUTH TWICE DAILY WITH MEALS 180 tablet 2    albuterol sulfate HFA (PROVENTIL;VENTOLIN;PROAIR) 108 (90 Base) MCG/ACT inhaler Inhale 2 puffs into the lungs every 6 hours as needed for Wheezing 9 each 0    acetaminophen (TYLENOL) 500 MG tablet Take 1 tablet by mouth every 6 hours as needed for Pain (Take 1-2 tablets every 6 hours) 120 tablet 1    aspirin 81 MG chewable tablet Take 1 tablet by mouth daily 90 tablet 5    [DISCONTINUED] glimepiride (AMARYL) 4 MG tablet Take 1 tablet by mouth 2 times daily 60 tablet 0    [DISCONTINUED] lisinopril (PRINIVIL;ZESTRIL) 5 MG tablet Take 1 tablet by mouth daily 30 tablet 2    Multiple Vitamins-Minerals (THERAPEUTIC MULTIVITAMIN-MINERALS) tablet Take 1 tablet by mouth daily      b complex vitamins capsule Take 1 capsule by mouth daily       No current facility-administered medications on file prior to encounter. REVIEW OF SYSTEMS    Pertinent items are noted in HPI.     Objective:      BP (!) 158/82   Pulse 90   Temp 97 °F (36.1 °C)   Resp 16     Wt Readings from Last 3 Encounters:   01/13/23 300 lb (136.1 kg)   12/12/22 300 lb (136.1 kg)   12/02/22 300 lb (136.1 kg)       PHYSICAL EXAM    General Appearance: alert and oriented to person, place and time, well developed and well- nourished, in no acute distress  Skin: warm and dry, no rash or erythema  Head: normocephalic and atraumatic  Eyes: pupils equal, round, and reactive to light, conjunctivae normal  Neck: supple and non-tender   Abdomen: soft, non-distended  Extremities: no cyanosis, clubbing or edema  Neurologic:  no cranial nerve deficit,  coordination and speech normal      Assessment:     Problem List Items Addressed This Visit       Nonhealing open wound of groin - Primary    Relevant Medications    lidocaine (XYLOCAINE) 2 % uro-jet    Other Relevant Orders    Initiate Outpatient Wound Care Protocol    Personal history of smoking    Relevant Medications    lidocaine (XYLOCAINE) 2 % uro-jet    Other Relevant Orders    Initiate Outpatient Wound Care Protocol        Procedure Note  Indications:  Based on my examination of this patient's wound(s)/ulcer(s) today, debridement is required to promote healing and evaluate the wound base. Performed by: Rashad Hess MD    Consent obtained:  Yes    Time out taken:  Yes    Pain Control: Anesthetic  Anesthetic: 4% Lidocaine Liquid Topical       Debridement:Excisional Debridement    Using curette the wound(s)/ulcer(s) was/were sharply debrided down through and including the removal of subcutaneous tissue and muscle/fascia.         Devitalized Tissue Debrided:  fibrin, biofilm, and slough    Pre Debridement Measurements:  Are located in the [place]  Documentation Flow Sheet    Wound/Ulcer #: 1    Post Debridement Measurements:  Wound/Ulcer Descriptions are Pre Debridement except measurements:        Wound 11/17/22 Scrotum Left #1 (Active)   Wound Image   01/13/23 0956   Wound Etiology Surgical 01/20/23 0928   Dressing Status Old drainage noted;New drainage noted 01/20/23 0928   Wound Cleansed Cleansed with saline 01/13/23 0956   Dressing/Treatment Other (comment) 12/12/22 1000   Wound Length (cm) 0.3 cm 01/20/23 0928   Wound Width (cm) 0.3 cm 01/20/23 0928   Wound Depth (cm) 0.1 cm 01/20/23 0928   Wound Surface Area (cm^2) 0.09 cm^2 01/20/23 0928   Change in Wound Size % (l*w) 99.45 01/20/23 0928   Wound Volume (cm^3) 0.009 cm^3 01/20/23 0928   Wound Healing % 100 01/20/23 0928   Post-Procedure Length (cm) 0.3 cm 01/20/23 0928   Post-Procedure Width (cm) 0.5 cm 01/13/23 0956   Post-Procedure Depth (cm) 0.1 cm 01/13/23 0956   Post-Procedure Surface Area (cm^2) 0.25 cm^2 01/13/23 0956   Post-Procedure Volume (cm^3) 0.025 cm^3 01/13/23 0956   Wound Assessment Pink/red 01/20/23 0928   Drainage Amount Moderate 01/20/23 0928   Drainage Description Serous 01/20/23 0928   Odor None 01/20/23 0928   Kavitha-wound Assessment Intact 01/20/23 0928   Margins Defined edges 01/20/23 0928   Wound Thickness Description not for Pressure Injury Full thickness 01/20/23 0928   Number of days: 63          Percent of Wound(s)/Ulcer(s) Debrided: 100%    Total Surface Area Debrided: 0.25 sq cm         Estimated Blood Loss:  Minimal    Hemostasis Achieved:  by pressure    Procedural Pain:  3  / 10     Post Procedural Pain:  2 / 10     Response to treatment:  Well tolerated by patient. Plan:   Lotrimin cream to the perineum area. Follow-up with dermatology for hidradenitis suppurativa. Treatment Note please see Discharge Instructions    Written patient dismissal instructions given to patient and signed by patient or POA.              Electronically signed by Dima Argeulles MD on 1/20/2023 at 10:02 AM

## 2023-01-20 NOTE — DISCHARGE INSTRUCTIONS
[address]                            Visit  Discharge Instructions / Physician Orders  DATE: 1/20/23     Home Care:NONE     SUPPLIES ORDERED THRU:         [employer]            DATE LAST SUPPLIED ordered 12/2/22     Wound Location:  Scrotum     Cleanse with: Liquid antibacterial soap and water, rinse well      Dressing Orders:  Primary dressing      Katie            Secondary dressing    4X4                   secure with           x 30days     Frequency:  Daily     Additional Orders: Increase protein to diet (meat, cheese, eggs, fish, peanut butter, nuts and beans)  Multivitamin daily  Lotrimin Cream for yeast infection  To see Derm on Tuesday 1/24/23 at 1:30pm  OFFLOADING [] YES  TYPE:                  [] NA     Weekly wound care visits until determined otherwise. Antibiotic therapy-wound care related YES [] NO [x] NA[]     MY CHART []     Smart Device  []      HYPERBARIC TREATMENT-                TREATMENT #                          Your next appointment with the [address] [place] [address] is in 2 weeks with Dr. Jody Day                                                                                                   (Please note your next appointment above and if you are unable to keep, kindly give a 24 hour notice. Thank you.)  If more than 15 min late we cannot guarantee you will be seen due to clinician schedule  Per Policy, Excessive cancellation will call for dismissal from program.  If you experience any of the following, please call the [address] [place] [address] during business hours:  [phone]     * Increase in Pain  * Temperature over 101  * Increase in drainage from your wound  * Drainage with a foul odor  * Bleeding  * Increase in swelling  * Need for compression bandage changes due to slippage, breakthrough drainage.      If you need medical attention outside of the business hours of the [address] [place] [address] please contact your PCP or go to the nearest emergency room. The information contained in the After Visit Summary has been reviewed with me, the patient and/or responsible adult, by my health care provider(s). I had the opportunity to ask questions regarding this information.  I have elected to receive;      []After Visit Summary  [x]Comprehensive Discharge Instruction        Patient signature______________________________________Date:________  Electronically signed by Karo Herndon RN on 1/20/2023 at 10:00 AM  Electronically signed by Caesar Moss MD on 1/20/2023 at 10:04 AM

## 2023-01-24 ENCOUNTER — OFFICE VISIT (OUTPATIENT)
Dept: DERMATOLOGY | Age: 58
End: 2023-01-24
Payer: COMMERCIAL

## 2023-01-24 VITALS
TEMPERATURE: 98.6 F | HEART RATE: 78 BPM | OXYGEN SATURATION: 96 % | WEIGHT: 309.8 LBS | BODY MASS INDEX: 41.96 KG/M2 | SYSTOLIC BLOOD PRESSURE: 134 MMHG | HEIGHT: 72 IN | DIASTOLIC BLOOD PRESSURE: 77 MMHG

## 2023-01-24 DIAGNOSIS — L73.2 HIDRADENITIS SUPPURATIVA: Primary | ICD-10-CM

## 2023-01-24 DIAGNOSIS — L05.91 PILONIDAL CYST: ICD-10-CM

## 2023-01-24 DIAGNOSIS — Z84.0: ICD-10-CM

## 2023-01-24 PROCEDURE — G8417 CALC BMI ABV UP PARAM F/U: HCPCS | Performed by: PHYSICIAN ASSISTANT

## 2023-01-24 PROCEDURE — G8427 DOCREV CUR MEDS BY ELIG CLIN: HCPCS | Performed by: PHYSICIAN ASSISTANT

## 2023-01-24 PROCEDURE — 99204 OFFICE O/P NEW MOD 45 MIN: CPT | Performed by: PHYSICIAN ASSISTANT

## 2023-01-24 PROCEDURE — 3017F COLORECTAL CA SCREEN DOC REV: CPT | Performed by: PHYSICIAN ASSISTANT

## 2023-01-24 PROCEDURE — G8484 FLU IMMUNIZE NO ADMIN: HCPCS | Performed by: PHYSICIAN ASSISTANT

## 2023-01-24 PROCEDURE — 4004F PT TOBACCO SCREEN RCVD TLK: CPT | Performed by: PHYSICIAN ASSISTANT

## 2023-01-24 RX ORDER — DOXYCYCLINE HYCLATE 100 MG/1
CAPSULE ORAL
Qty: 60 CAPSULE | Refills: 2 | Status: SHIPPED | OUTPATIENT
Start: 2023-01-24

## 2023-01-24 RX ORDER — CLINDAMYCIN PHOSPHATE 10 UG/ML
LOTION TOPICAL
Qty: 60 ML | Refills: 3 | Status: SHIPPED | OUTPATIENT
Start: 2023-01-24

## 2023-01-24 NOTE — PROGRESS NOTES
Dermatology Patient Note  [id number]  [address]  [id number]  Dept: [phone]  Dept Fax: [phone]      VISITDATE: 1/24/2023   REFERRING PROVIDER: Treasure Chaney MD      Lexus Han is a 65 y.o. male  who presents today in the office for:    New Patient (HS pt states he has active flares all over mostly under the rt arm the lower back and groin area. Pt states he was using a \"drawing salve\" that helped drain his flares )      HISTORY OF PRESENT ILLNESS:  As above. Years h/o tender, scarring lesions on buttocks, pubis, medial thighs, axilla, and chest.  Pt is a smoker. Sister also \"gets boils\". No Tx, to date. PEARSON STAGE III.     MEDICAL PROBLEMS:  Patient Active Problem List    Diagnosis Date Noted    Candidiasis of perineum 01/13/2023     Priority: Medium    Personal history of smoking 11/29/2022     Priority: Medium    Lung nodule 11/29/2022     Priority: Medium    Influenza vaccination declined 11/29/2022     Priority: Medium    Nonhealing open wound of groin 11/22/2022     Priority: Medium    Hidradenitis suppurativa 11/22/2022     Priority: Medium    Testicular abscess 11/16/2022     Priority: Medium    Moderate asthma without complication 61/68/5501    Vitamin D deficiency 08/16/2021    Arthritis of multiple sites 08/16/2021    PVD (peripheral vascular disease) ([address] 08/16/2021    Dermatitis 08/16/2021    Stage 3a chronic kidney disease ([address] 08/13/2021    Polyneuropathy 06/11/2021    Current smoker 06/11/2021    Mild intermittent asthma without complication 91/75/1474    Mixed hyperlipidemia 06/08/2021    Hyperhomocysteinemia ([address] 11/27/2017    Hypercoagulable state ([address] 10/16/2017    Low HDL (under 40) 06/19/2017    Morbid obesity with BMI of 40.0-44.9, adult ([address] 06/19/2017    Type 2 diabetes mellitus with diabetic polyneuropathy, without long-term current use of insulin ([address] 06/20/2016 MIGUEL treated with BiPAP 05/26/2016    Benign hypertension with CKD (chronic kidney disease), stage II 10/06/2014       CURRENT MEDICATIONS:   Current Outpatient Medications   Medication Sig Dispense Refill    benzoyl peroxide 5 % external liquid Wash affected areas once daily 227 g 3    clindamycin (CLEOCIN T) 1 % lotion Apply to affected areas daily 60 mL 3    doxycycline hyclate (VIBRAMYCIN) 100 MG capsule Take 1 pill twice daily with food 60 capsule 2    varenicline (CHANTIX STARTING MONTH JENNIE) 0.5 MG X 11 & 1 MG X 42 tablet 0.5 mg po daily x 3 days, 0.5 mg BID x 4 days, then 1 mg BID thereafter . Call for refill 53 each 0    Semaglutide 3 MG TABS Take 3 mg by mouth daily 1st step 30 tablet 0    blood glucose monitor strips Testing twice a day. Please dispense according to patients insurance.  200 strip 3    hydroCHLOROthiazide (HYDRODIURIL) 25 MG tablet Take 1 tablet by mouth once daily 90 tablet 0    atorvastatin (LIPITOR) 20 MG tablet Take 1 tablet by mouth once daily 90 tablet 0    glipiZIDE (GLUCOTROL) 10 MG tablet Take 1 tablet by mouth every morning (before breakfast) 60 tablet 0    lisinopril (PRINIVIL;ZESTRIL) 5 MG tablet Take 1 tablet by mouth once daily 90 tablet 2    metFORMIN (GLUCOPHAGE) 1000 MG tablet TAKE 1 TABLET BY MOUTH TWICE DAILY WITH MEALS 180 tablet 2    aspirin 81 MG chewable tablet Take 1 tablet by mouth daily 90 tablet 5    Multiple Vitamins-Minerals (THERAPEUTIC MULTIVITAMIN-MINERALS) tablet Take 1 tablet by mouth daily      b complex vitamins capsule Take 1 capsule by mouth daily      albuterol sulfate HFA (PROVENTIL;VENTOLIN;PROAIR) 108 (90 Base) MCG/ACT inhaler Inhale 2 puffs into the lungs every 6 hours as needed for Wheezing 9 each 0    acetaminophen (TYLENOL) 500 MG tablet Take 1 tablet by mouth every 6 hours as needed for Pain (Take 1-2 tablets every 6 hours) 120 tablet 1     Current Facility-Administered Medications   Medication Dose Route Frequency Provider Last Rate Last Admin    triamcinolone acetonide (KENALOG) injection 4 mg  4 mg Intra-LESional Once [place] TODD Holland           ALLERGIES:   Allergies   Allergen Reactions    Januvia [Sitagliptin] Other (See Comments)     Constipation       SOCIAL HISTORY:  Social History     Tobacco Use    Smoking status: Every Day     Packs/day: 2.00     Years: 15.00     Pack years: 30.00     Types: Cigarettes    Smokeless tobacco: Never   Substance Use Topics    Alcohol use: No       Pertinent ROS:  Review of Systems  Skin: Denies any new changing, growing or bleeding lesions or rashes except as described in the HPI   Constitutional: Denies fevers, chills, and malaise. PHYSICAL EXAM:   /77   Pulse 78   Temp 98.6 °F (37 °C) (Temporal)   Ht 6' (1.829 m)   Wt (!) 309 lb 12.8 oz (140.5 kg)   SpO2 96%   BMI 42.02 kg/m²     The patient is generally well appearing, well nourished, alert and conversational. Affect is normal.    Cutaneous Exam:  Physical Exam  Total body skin exam excluding external genitalia: head/face, neck, both arms, chest, back, abdomen, both legs, buttocks, digits and/or nails, was examined. Genital exam was deferred as patient denied having any lesions in this area. Complete visualization of scalp may be limited by hair density, length, and/or style    Facial covering was not removed during examination. Diagnoses/exam findings/medical history pertinent to this visit are listed below:    Assessment:   Diagnosis Orders   1. Hidradenitis suppurativa  benzoyl peroxide 5 % external liquid    clindamycin (CLEOCIN T) 1 % lotion    doxycycline hyclate (VIBRAMYCIN) 100 MG capsule      2. Family history of hidradenitis suppurativa        3. Pilonidal cyst  triamcinolone acetonide (KENALOG) injection 4 mg           Plan:  1.  Hidradenitis suppurativa  - chronic illness with progression and/or exacerbation   - Patient counseled regarding side effects of doxycycline, including photosensitivity, gastrointestinal upset, increased intracranial pressure, chemical esophagitis, teratogenicity,  increased intracranial pressure, and severe drug reaction. Patient also counseled to take doxycycline with food and a full glass of water. - benzoyl peroxide 5 % external liquid; Wash affected areas once daily  Dispense: 227 g; Refill: 3  - clindamycin (CLEOCIN T) 1 % lotion; Apply to affected areas daily  Dispense: 60 mL; Refill: 3  - doxycycline hyclate (VIBRAMYCIN) 100 MG capsule; Take 1 pill twice daily with food  Dispense: 60 capsule; Refill: 2    2. Family history of hidradenitis suppurativa    3. Pilonidal cyst  Intralesional Injection: After discussion of risks including atrophy and dyspigmentation, patient gives verbal consent to proceed. After cleansing with alcohol the  pilonidal cyst  on the Left gluteal cleft were injected with 0.1 ml of Kenalog 40 mg/mL       [facility]    Future Appointments   Date Time Provider Luz Maria Spence   2/3/2023  8:15 AM Olivia Cardenas MD [facility]   2/3/2023  9:30 AM [facility]   4/25/2023  2:00 PM Georgie Kelley PA-C  derm [facility]         Patient Instructions   Hidradenitis suppurativa (HS) is a chronic, systemic (throughout the body) inflammatory condition where an overactive immune system contributes to inflammation below the surface of the skin. With HS, that means inflammation starts on the inside--before showing up in the layers of the skin as bumps, nodules, abscesses, and tunnels. While the exact cause of HS is unknown, theres a lot more to HS than what you can see on the skin. HS can get worse over time. It can progress to form tracts or tunnels between abscesses deep within the skin, as well as scarring on the surface, and more. Areas affected by hidradenitis suppurativa  HS usually develops in areas that grow hair, and where skin rubs against skin. It can also occur where sweat glands are located.  Areas affected by HS can include: underarms, inner thighs, underneath the breasts, buttocks, groin, back of neck and ears. Who is affected by HS? People all over the world are living with HS. However, some groups are more affected than others. Female vs. male - Women are 3X more likely to get HS than men. Genetics - HS can run in families. More than 30% of people with HS have a family member who also has the disease. Age range - HS usually develops during adolescence or in young adults in their early 25s. It rarely develops past age 54. How is HS diagnosed? To diagnose hidradenitis suppurativa, your dermatologist will examine your skin closely and ask you a number of questions about your signs and symptoms. If any of your lesions are leaking fluid, your doctor may swab the fluid to see if you have an infection. Since HS can sometimes look like other skin conditions, getting the right diagnosis from a dermatologist is key. Thats because an accurate diagnosis can help you and your dermatologist determine appropriate care for you. What causes hidradenitis suppurativa (HS)? While genetics and environment are among the factors that play a role in the development of HS, the exact cause of the condition is still unknown. HS is a lot more than just what you can see and feel on the outside. In fact, its believed to be related to a problem in the immune system. Thats why people sometimes use the term autoimmune disease to describe HS--though research has shown it's actually a systemic inflammatory condition related to the immune system. How HS affects the immune system  Picture your immune system operating like a network of factories throughout your body. It produces highly specified proteins and cells that fight off harmful germs and bacteria and help you recover from injuries like broken bones, bruises, or sprains. When your immune system is healthy, these cells and proteins usually stay at normal levels.  But when they come across trouble in your body, the factories get a signal to crank up production. This sudden, temporary increase in cells and proteins causes inflammation to happen. This is a natural reaction in your body and helps your body to heal. Once the problem is dealt with, another signal goes out to resume normal production. For people with HS, the signal to create inflammation is uncontrolled, producing too many cells and proteins throughout the body for too long. Even when youre not seeing or feeling a painful flare at the skin level, your immune system is overactive--making it more likely youll experience flares in the future. The consequences of HSs uncontrolled inflammation starts on the inside--before showing up in the layers of the skin as nodules, abscesses, and tunnels. And even when these symptoms seem to have moved away from a certain area of the skin, they can leave deep, permanent scars. Like in HS, people with other chronic diseases (like rheumatoid arthritis, Crohns disease, and ulcerative colitis) can have increased inflammation from their immune system. It just impacts other areas of their body. HS is not thought to cause these diseases or vice versa. Its important to tell your doctor if you are experiencing signs or symptoms of other diseases. Hidradenitis suppurativa (HS) symptoms   The symptoms of hidradenitis suppurativa (HS) are complex and can change over time. When HS symptoms reappear or worsen, its called a flare. HS might begin as a bump in your inner thigh, a lump in your armpit that seems like a cyst, or even what looks to be a pimple on your groin. But as time goes on, these bumps--also called nodules or abscesses-- can: Become large and painful, Last for months, Leak foul-smelling pus when they rupture or burst, Become connected by tunnels forming deep in the skin, Leave a scar when they heal, Sometimes clear but reappear in the same spot.   The stages of HS  Since HS can progress over time, its important to pay close attention to any changing or worsening symptoms. But because theres a lot more to HS than what you can see on the skin, its especially important to work with a dermatologist to determine how severe your HS may be, and if it has progressed since your last visit. Dermatologists often measure the severity of HS in stages--from Stage I (mild), to Stage II (moderate), to Stage III (severe). *  Stage 1 (mild): Usually shows up as one or more abscesses with no scarring or tunneling under the skin. Flares may clear up but then come back during any stage. Stage 2 (moderate): Abscesses are widely spread and may become connected by tunnels. Tunnels (also known as tracts) can start forming deep in the skin, though theyre not always visible on the surface. Scars begin to form on the surface and within the deeper tissues. Large areas of the body are covered by abscesses and scarring with widespread tunneling beneath the skin, connecting multiple abscesses. Source: [employer]   Do not lay down for at least 1 hour after taking doxycycline, as it can cause a pill esophagitis. Avoid excessive dairy products for at least 2 hours after taking doxycycline as it will cause the medication to become inactive. You can not get pregnant while on this medication as it can cause birth defects. This medication can make your skin sensitive to the sun so be sure to use sunscreen. If you develop a persistent headache or vision changes, stop the medication and call the office. Sun Protection     There are two types of sun rays that are harmful to the skin. UVA rays cause skin aging and skin cancer, such as melanoma. UVB rays cause sunburns, cataracts, and also contribute to skin cancer. The American-Academy of Dermatology recommends that children and adults wear a broad spectrum, waterproof sunscreen with a Sun Protection Factor (SPF) of 30 or higher.   It is important to check the ingredient label to be sure the sunscreen will protect the skin from both UVA and UVB sunrays. Your sunscreen should contain at least one of the following ingredients: titanium dioxide, zinc oxide, or avobenzone. Sunscreen will not be effective unless it is applied to all exposed skin. Sunscreens work best if they are applied 30 minutes before sun exposure. They should be reapplied every 2 hours and after any water exposure. Sunscreen is not perfect. It is important to use other methods to protect the skin from sun exposure also. Wear hats, sunglasses and other sun protective clothing when outdoors.   Stay in the shade during the peak hours of sun exposure between 10 AM and 4 PM.    Electronically signed by Ranulfo Connors PA-C on 1/24/23 at 5:02 PM EST

## 2023-01-24 NOTE — PATIENT INSTRUCTIONS
Hidradenitis suppurativa (HS) is a chronic, systemic (throughout the body) inflammatory condition where an overactive immune system contributes to inflammation below the surface of the skin. With HS, that means inflammation starts on the inside--before showing up in the layers of the skin as bumps, nodules, abscesses, and tunnels. While the exact cause of HS is unknown, there’s a lot more to HS than what you can see on the skin.  HS can get worse over time. It can progress to form tracts or tunnels between abscesses deep within the skin, as well as scarring on the surface, and more.  Areas affected by hidradenitis suppurativa  HS usually develops in areas that grow hair, and where skin rubs against skin. It can also occur where sweat glands are located. Areas affected by HS can include: underarms, inner thighs, underneath the breasts, buttocks, groin, back of neck and ears.  Who is affected by HS?  People all over the world are living with HS. However, some groups are more affected than others.  Female vs. male - Women are 3X more likely to get HS than men.  Genetics - HS can run in families. More than 30% of people with HS have a family member who also has the disease.  Age range - HS usually develops during adolescence or in young adults in their early 20s. It rarely develops past age 55.  How is HS diagnosed?  To diagnose hidradenitis suppurativa, your dermatologist will examine your skin closely and ask you a number of questions about your signs and symptoms. If any of your lesions are leaking fluid, your doctor may swab the fluid to see if you have an infection.  Since HS can sometimes look like other skin conditions, getting the right diagnosis from a dermatologist is key. That’s because an accurate diagnosis can help you and your dermatologist determine appropriate care for you.  What causes hidradenitis suppurativa (HS)?  While genetics and environment are among the factors that play a role in the  development of HS, the exact cause of the condition is still unknown. HS is a lot more than just what you can see and feel on the outside. In fact, its believed to be related to a problem in the immune system. Thats why people sometimes use the term autoimmune disease to describe HS--though research has shown it's actually a systemic inflammatory condition related to the immune system. How HS affects the immune system  Picture your immune system operating like a network of factories throughout your body. It produces highly specified proteins and cells that fight off harmful germs and bacteria and help you recover from injuries like broken bones, bruises, or sprains. When your immune system is healthy, these cells and proteins usually stay at normal levels. But when they come across trouble in your body, the factories get a signal to crank up production. This sudden, temporary increase in cells and proteins causes inflammation to happen. This is a natural reaction in your body and helps your body to heal. Once the problem is dealt with, another signal goes out to resume normal production. For people with HS, the signal to create inflammation is uncontrolled, producing too many cells and proteins throughout the body for too long. Even when youre not seeing or feeling a painful flare at the skin level, your immune system is overactive--making it more likely youll experience flares in the future. The consequences of HSs uncontrolled inflammation starts on the inside--before showing up in the layers of the skin as nodules, abscesses, and tunnels. And even when these symptoms seem to have moved away from a certain area of the skin, they can leave deep, permanent scars. Like in HS, people with other chronic diseases (like rheumatoid arthritis, Crohns disease, and ulcerative colitis) can have increased inflammation from their immune system. It just impacts other areas of their body.  HS is not thought to cause these diseases or vice versa. Its important to tell your doctor if you are experiencing signs or symptoms of other diseases. Hidradenitis suppurativa (HS) symptoms   The symptoms of hidradenitis suppurativa (HS) are complex and can change over time. When HS symptoms reappear or worsen, its called a flare. HS might begin as a bump in your inner thigh, a lump in your armpit that seems like a cyst, or even what looks to be a pimple on your groin. But as time goes on, these bumps--also called nodules or abscesses-- can: Become large and painful, Last for months, Leak foul-smelling pus when they rupture or burst, Become connected by tunnels forming deep in the skin, Leave a scar when they heal, Sometimes clear but reappear in the same spot. The stages of HS  Since HS can progress over time, its important to pay close attention to any changing or worsening symptoms. But because theres a lot more to HS than what you can see on the skin, its especially important to work with a dermatologist to determine how severe your HS may be, and if it has progressed since your last visit. Dermatologists often measure the severity of HS in stages--from Stage I (mild), to Stage II (moderate), to Stage III (severe). *  Stage 1 (mild): Usually shows up as one or more abscesses with no scarring or tunneling under the skin. Flares may clear up but then come back during any stage. Stage 2 (moderate): Abscesses are widely spread and may become connected by tunnels. Tunnels (also known as tracts) can start forming deep in the skin, though theyre not always visible on the surface. Scars begin to form on the surface and within the deeper tissues. Large areas of the body are covered by abscesses and scarring with widespread tunneling beneath the skin, connecting multiple abscesses. Source: [employer]   Do not lay down for at least 1 hour after taking doxycycline, as it can cause a pill esophagitis.  Avoid excessive dairy products for at least 2 hours after taking doxycycline as it will cause the medication to become inactive. You can not get pregnant while on this medication as it can cause birth defects. This medication can make your skin sensitive to the sun so be sure to use sunscreen. If you develop a persistent headache or vision changes, stop the medication and call the office. Sun Protection     There are two types of sun rays that are harmful to the skin. UVA rays cause skin aging and skin cancer, such as melanoma. UVB rays cause sunburns, cataracts, and also contribute to skin cancer. The American-Academy of Dermatology recommends that children and adults wear a broad spectrum, waterproof sunscreen with a Sun Protection Factor (SPF) of 30 or higher. It is important to check the ingredient label to be sure the sunscreen will protect the skin from both UVA and UVB sunrays. Your sunscreen should contain at least one of the following ingredients: titanium dioxide, zinc oxide, or avobenzone. Sunscreen will not be effective unless it is applied to all exposed skin. Sunscreens work best if they are applied 30 minutes before sun exposure. They should be reapplied every 2 hours and after any water exposure. Sunscreen is not perfect. It is important to use other methods to protect the skin from sun exposure also. Wear hats, sunglasses and other sun protective clothing when outdoors.   Stay in the shade during the peak hours of sun exposure between 10 AM and 4 PM.

## 2023-01-26 ENCOUNTER — CARE COORDINATION (OUTPATIENT)
Dept: CARE COORDINATION | Age: 58
End: 2023-01-26

## 2023-01-27 ENCOUNTER — CARE COORDINATION (OUTPATIENT)
Dept: CARE COORDINATION | Age: 58
End: 2023-01-27

## 2023-01-27 NOTE — CARE COORDINATION
attempted to contact [address] [place] [address].  has made multiple attempts to contact [address] [place] [address].  has been unsuccessful making contact with emil.  will sign off.

## 2023-01-27 NOTE — CARE COORDINATION
Attempted to reach patient for nutrition care coordination  Follow up. Have been unable to reach patient, removed  From panel.   ElvinLD

## 2023-01-30 NOTE — DISCHARGE INSTRUCTIONS
[address] [place] [facility]                            Visit  Discharge Instructions / Physician Orders  DATE: 2/3/23     Home Care:NONE     SUPPLIES ORDERED THRU:         [employer]            DATE LAST SUPPLIED ordered 12/2/22     Wound Location:  Scrotum-healed     Cleanse with: Liquid antibacterial soap and water, rinse well      Dressing Orders:        Frequency:        Additional Orders: Increase protein to diet (meat, cheese, eggs, fish, peanut butter, nuts and beans)  Multivitamin daily  OFFLOADING [] YES  TYPE:                  [] NA     Weekly wound care visits until determined otherwise. Antibiotic therapy-wound care related YES [] NO [x] NA[]     MY CHART []     Smart Device  []      HYPERBARIC TREATMENT-                TREATMENT #                          Your next appointment with the [address] [place] [address] is Call if needed                                                                                                   (Please note your next appointment above and if you are unable to keep, kindly give a 24 hour notice. Thank you.)  If more than 15 min late we cannot guarantee you will be seen due to clinician schedule  Per Policy, Excessive cancellation will call for dismissal from program.  If you experience any of the following, please call the [address] [place] [address] during business hours:  [phone]     * Increase in Pain  * Temperature over 101  * Increase in drainage from your wound  * Drainage with a foul odor  * Bleeding  * Increase in swelling  * Need for compression bandage changes due to slippage, breakthrough drainage. If you need medical attention outside of the business hours of the [address] [place] [address] please contact your PCP or go to the nearest emergency room. The information contained in the After Visit Summary has been reviewed with me, the patient and/or responsible adult, by my health care provider(s).  I had the opportunity to ask questions regarding this information.  I have elected to receive;      []After Visit Summary  [x]Comprehensive Discharge Instruction        Patient signature______________________________________Date:________  Electronically signed by Stewart Doe RN on 2/3/2023 at 8:52 AM  Electronically signed by Tan Solorio MD on 2/3/2023 at 8:52 AM

## 2023-02-03 ENCOUNTER — OFFICE VISIT (OUTPATIENT)
Dept: FAMILY MEDICINE CLINIC | Age: 58
End: 2023-02-03
Payer: COMMERCIAL

## 2023-02-03 ENCOUNTER — HOSPITAL ENCOUNTER (OUTPATIENT)
Dept: WOUND CARE | Age: 58
Discharge: HOME | End: 2023-02-03
Payer: COMMERCIAL

## 2023-02-03 VITALS
SYSTOLIC BLOOD PRESSURE: 154 MMHG | HEIGHT: 72 IN | WEIGHT: 309 LBS | DIASTOLIC BLOOD PRESSURE: 84 MMHG | BODY MASS INDEX: 41.85 KG/M2 | HEART RATE: 89 BPM | RESPIRATION RATE: 18 BRPM | TEMPERATURE: 97.8 F

## 2023-02-03 VITALS
OXYGEN SATURATION: 96 % | TEMPERATURE: 98.6 F | BODY MASS INDEX: 41.12 KG/M2 | HEART RATE: 75 BPM | WEIGHT: 303.6 LBS | HEIGHT: 72 IN | DIASTOLIC BLOOD PRESSURE: 72 MMHG | SYSTOLIC BLOOD PRESSURE: 148 MMHG

## 2023-02-03 DIAGNOSIS — R91.1 SOLITARY LUNG NODULE: ICD-10-CM

## 2023-02-03 DIAGNOSIS — Z87.891 PERSONAL HISTORY OF SMOKING: ICD-10-CM

## 2023-02-03 DIAGNOSIS — E78.2 MIXED HYPERLIPIDEMIA: ICD-10-CM

## 2023-02-03 DIAGNOSIS — S31.109A NONHEALING OPEN WOUND OF GROIN: Primary | ICD-10-CM

## 2023-02-03 DIAGNOSIS — G47.33 OSA TREATED WITH BIPAP: Chronic | ICD-10-CM

## 2023-02-03 DIAGNOSIS — I10 ESSENTIAL HYPERTENSION: ICD-10-CM

## 2023-02-03 DIAGNOSIS — S31.109A NONHEALING OPEN WOUND OF GROIN: ICD-10-CM

## 2023-02-03 DIAGNOSIS — E11.42 TYPE 2 DIABETES MELLITUS WITH DIABETIC POLYNEUROPATHY, WITHOUT LONG-TERM CURRENT USE OF INSULIN (HCC): Primary | ICD-10-CM

## 2023-02-03 PROCEDURE — 3017F COLORECTAL CA SCREEN DOC REV: CPT | Performed by: FAMILY MEDICINE

## 2023-02-03 PROCEDURE — 99214 OFFICE O/P EST MOD 30 MIN: CPT | Performed by: FAMILY MEDICINE

## 2023-02-03 PROCEDURE — G8484 FLU IMMUNIZE NO ADMIN: HCPCS | Performed by: FAMILY MEDICINE

## 2023-02-03 PROCEDURE — 3077F SYST BP >= 140 MM HG: CPT | Performed by: FAMILY MEDICINE

## 2023-02-03 PROCEDURE — G8417 CALC BMI ABV UP PARAM F/U: HCPCS | Performed by: FAMILY MEDICINE

## 2023-02-03 PROCEDURE — 3046F HEMOGLOBIN A1C LEVEL >9.0%: CPT | Performed by: FAMILY MEDICINE

## 2023-02-03 PROCEDURE — 3078F DIAST BP <80 MM HG: CPT | Performed by: FAMILY MEDICINE

## 2023-02-03 PROCEDURE — 4004F PT TOBACCO SCREEN RCVD TLK: CPT | Performed by: FAMILY MEDICINE

## 2023-02-03 PROCEDURE — G8427 DOCREV CUR MEDS BY ELIG CLIN: HCPCS | Performed by: FAMILY MEDICINE

## 2023-02-03 PROCEDURE — 2022F DILAT RTA XM EVC RTNOPTHY: CPT | Performed by: FAMILY MEDICINE

## 2023-02-03 PROCEDURE — 99212 OFFICE O/P EST SF 10 MIN: CPT

## 2023-02-03 PROCEDURE — 99213 OFFICE O/P EST LOW 20 MIN: CPT | Performed by: INTERNAL MEDICINE

## 2023-02-03 RX ORDER — LISINOPRIL 10 MG/1
TABLET ORAL
Qty: 90 TABLET | Refills: 1 | Status: SHIPPED | OUTPATIENT
Start: 2023-02-03

## 2023-02-03 RX ORDER — LIDOCAINE HYDROCHLORIDE 40 MG/ML
SOLUTION TOPICAL ONCE
Status: DISCONTINUED | OUTPATIENT
Start: 2023-02-03 | End: 2023-02-04 | Stop reason: HOSPADM

## 2023-02-03 RX ORDER — LIDOCAINE HYDROCHLORIDE 40 MG/ML
SOLUTION TOPICAL ONCE
OUTPATIENT
Start: 2023-02-03 | End: 2023-02-03

## 2023-02-03 RX ORDER — LIDOCAINE HYDROCHLORIDE 20 MG/ML
JELLY TOPICAL ONCE
OUTPATIENT
Start: 2023-02-03 | End: 2023-02-03

## 2023-02-03 RX ORDER — GLIPIZIDE 10 MG/1
10 TABLET ORAL
Qty: 180 TABLET | Refills: 1 | Status: SHIPPED | OUTPATIENT
Start: 2023-02-03

## 2023-02-03 SDOH — ECONOMIC STABILITY: FOOD INSECURITY: WITHIN THE PAST 12 MONTHS, THE FOOD YOU BOUGHT JUST DIDN'T LAST AND YOU DIDN'T HAVE MONEY TO GET MORE.: NEVER TRUE

## 2023-02-03 SDOH — ECONOMIC STABILITY: FOOD INSECURITY: WITHIN THE PAST 12 MONTHS, YOU WORRIED THAT YOUR FOOD WOULD RUN OUT BEFORE YOU GOT MONEY TO BUY MORE.: NEVER TRUE

## 2023-02-03 SDOH — ECONOMIC STABILITY: HOUSING INSECURITY
IN THE LAST 12 MONTHS, WAS THERE A TIME WHEN YOU DID NOT HAVE A STEADY PLACE TO SLEEP OR SLEPT IN A SHELTER (INCLUDING NOW)?: NO

## 2023-02-03 SDOH — ECONOMIC STABILITY: INCOME INSECURITY: HOW HARD IS IT FOR YOU TO PAY FOR THE VERY BASICS LIKE FOOD, HOUSING, MEDICAL CARE, AND HEATING?: NOT HARD AT ALL

## 2023-02-03 ASSESSMENT — PATIENT HEALTH QUESTIONNAIRE - PHQ9
SUM OF ALL RESPONSES TO PHQ QUESTIONS 1-9: 0
SUM OF ALL RESPONSES TO PHQ9 QUESTIONS 1 & 2: 0
SUM OF ALL RESPONSES TO PHQ QUESTIONS 1-9: 0
2. FEELING DOWN, DEPRESSED OR HOPELESS: 0
1. LITTLE INTEREST OR PLEASURE IN DOING THINGS: 0
SUM OF ALL RESPONSES TO PHQ QUESTIONS 1-9: 0
SUM OF ALL RESPONSES TO PHQ QUESTIONS 1-9: 0

## 2023-02-03 ASSESSMENT — ENCOUNTER SYMPTOMS
SINUS PRESSURE: 0
BACK PAIN: 0
WHEEZING: 1
COLOR CHANGE: 1
BLOOD IN STOOL: 0
RHINORRHEA: 0
COUGH: 0
ABDOMINAL DISTENTION: 0
STRIDOR: 0
NAUSEA: 0
EYE REDNESS: 0
TROUBLE SWALLOWING: 0
SHORTNESS OF BREATH: 0
VOMITING: 0
CONSTIPATION: 0
RECTAL PAIN: 0
CHEST TIGHTNESS: 0
SORE THROAT: 0
DIARRHEA: 0
ABDOMINAL PAIN: 0

## 2023-02-03 ASSESSMENT — VISUAL ACUITY
OS_CC: 20/25
OD_CC: 20/25

## 2023-02-03 NOTE — PROGRESS NOTES
Visit Information    Have you changed or started any medications since your last visit including any over-the-counter medicines, vitamins, or herbal medicines? no   Have you stopped taking any of your medications? Is so, why? -  yes -   Are you having any side effects from any of your medications? - no    Have you seen any other physician or provider since your last visit?  no   Have you had any other diagnostic tests since your last visit?  no   Have you been seen in the emergency room and/or had an admission in a hospital since we last saw you?  no   Have you had your routine dental cleaning in the past 6 months?  no     Do you have an active MyChart account? If no, what is the barrier?   Yes    Patient Care Team:  ELDA Rivero - CNP as PCP - General (Family Medicine)  Michel Roldan MD as PCP - Empaneled Provider  Alisha Gutierrez MD as Consulting Physician (Infectious Diseases)    Medical History Review  Past Medical, Family, and Social History reviewed and does contribute to the patient presenting condition    Health Maintenance   Topic Date Due    Hepatitis B vaccine (1 of 3 - Risk 3-dose series) Never done    DTaP/Tdap/Td vaccine (1 - Tdap) Never done    Shingles vaccine (1 of 2) Never done    Low dose CT lung screening  Never done    Diabetic retinal exam  05/15/2018    Diabetic foot exam  03/05/2020    Diabetic Alb to Cr ratio (uACR) test  03/05/2020    COVID-19 Vaccine (3 - Booster for [facility] Peter series) 11/20/2021    Flu vaccine (1) 06/30/2023 (Originally 8/1/2022)    A1C test (Diabetic or Prediabetic)  03/12/2023    Depression Screen  06/20/2023    Lipids  12/12/2023    GFR test (Diabetes, CKD 3-4, OR last GFR 15-59)  12/12/2023    Colorectal Cancer Screen  10/06/2024    Pneumococcal 0-64 years Vaccine  Completed    Hepatitis C screen  Completed    HIV screen  Completed    Hepatitis A vaccine  Aged Out    Hib vaccine  Aged Out    Meningococcal (ACWY) vaccine  Aged Out

## 2023-02-03 NOTE — PROGRESS NOTES
[address]   Progress Note and Procedure Note      Sheeba Carey  MEDICAL RECORD NUMBER:  [id number]  AGE: 65 y.o. GENDER: male  : 1965  EPISODE DATE:  2/3/2023    Subjective:     Chief Complaint   Patient presents with    Wound Check     scrotum         HISTORY of PRESENT ILLNESS HPI     Sheeba Carey is a 65 y.o. male who presents today for wound/ulcer evaluation. History of Wound Context: The patient Is here for left scrotum wound that was healed. He denied significant pain, erythematous rash to the groin is resolved with antifungal cream.   He also had several hydradenitis suppurativa lesions to the groin and axillary. The patient was evaluated by dermatology was started on oral doxycycline benzoyl peroxide wash and clindamycin lotion. He denied fever or chills, no nausea or vomiting, no other complaints  He was hospitalized recently at [facility] 1622 through 2022 for scrotal abscess status post perianal/scrotal exploration, incision and debridement for scrotal abscess. The patient was treated with IV antibiotics during his hospitalization was discharged on oral Augmentin and Zyvox that was completed. 2022 scrotal abscess culture grew group C and group B streptococcus. PAST MEDICAL HISTORY        Diagnosis Date    Anesthesia     NEVER HAD, FEARFUL HE'LL WAKE UP DISORIENTED & HURT SOMEONE. Benign hypertension with CKD (chronic kidney disease), stage II 10/6/2014    Diabetes mellitus ([facility] [address]     Hypertension     Sleep apnea     CPAP MACHINE AT NIGHT.     Umbilical hernia     Wears dentures     UPPER        PAST SURGICAL HISTORY    Past Surgical History:   Procedure Laterality Date    SCROTAL SURGERY N/A 2022    EXPLORATION OF SCROTUM, PERINEUM, LEFT INGUINAL REGION performed by Fatmata Cam MD at [address] [place] [address]  2022    INCISION AND DRAINAGE WITH WASHOUT performed by Fatmata Cam MD at [address]   5/24/16       FAMILY HISTORY    Family History   Problem Relation Age of Onset    Diabetes Father     High Blood Pressure Father        SOCIAL HISTORY    Social History     Tobacco Use    Smoking status: Every Day     Packs/day: 2.00     Years: 15.00     Pack years: 30.00     Types: Cigarettes    Smokeless tobacco: Never   Vaping Use    Vaping Use: Never used   Substance Use Topics    Alcohol use: No    Drug use: No       ALLERGIES    Allergies   Allergen Reactions    Januvia [Sitagliptin] Other (See Comments)     Constipation       MEDICATIONS    Current Outpatient Medications on File Prior to Encounter   Medication Sig Dispense Refill    benzoyl peroxide 5 % external liquid Wash affected areas once daily 227 g 3    clindamycin (CLEOCIN T) 1 % lotion Apply to affected areas daily 60 mL 3    doxycycline hyclate (VIBRAMYCIN) 100 MG capsule Take 1 pill twice daily with food 60 capsule 2    varenicline (CHANTIX STARTING MONTH PAK) 0.5 MG X 11 & 1 MG X 42 tablet 0.5 mg po daily x 3 days, 0.5 mg BID x 4 days, then 1 mg BID thereafter . Call for refill 53 each 0    Semaglutide 3 MG TABS Take 3 mg by mouth daily 1st step 30 tablet 0    blood glucose monitor strips Testing twice a day. Please dispense according to patients insurance.  200 strip 3    hydroCHLOROthiazide (HYDRODIURIL) 25 MG tablet Take 1 tablet by mouth once daily 90 tablet 0    atorvastatin (LIPITOR) 20 MG tablet Take 1 tablet by mouth once daily 90 tablet 0    glipiZIDE (GLUCOTROL) 10 MG tablet Take 1 tablet by mouth every morning (before breakfast) 60 tablet 0    lisinopril (PRINIVIL;ZESTRIL) 5 MG tablet Take 1 tablet by mouth once daily 90 tablet 2    metFORMIN (GLUCOPHAGE) 1000 MG tablet TAKE 1 TABLET BY MOUTH TWICE DAILY WITH MEALS 180 tablet 2    albuterol sulfate HFA (PROVENTIL;VENTOLIN;PROAIR) 108 (90 Base) MCG/ACT inhaler Inhale 2 puffs into the lungs every 6 hours as needed for Wheezing 9 each 0 acetaminophen (TYLENOL) 500 MG tablet Take 1 tablet by mouth every 6 hours as needed for Pain (Take 1-2 tablets every 6 hours) 120 tablet 1    aspirin 81 MG chewable tablet Take 1 tablet by mouth daily 90 tablet 5    [DISCONTINUED] glimepiride (AMARYL) 4 MG tablet Take 1 tablet by mouth 2 times daily 60 tablet 0    [DISCONTINUED] lisinopril (PRINIVIL;ZESTRIL) 5 MG tablet Take 1 tablet by mouth daily 30 tablet 2    Multiple Vitamins-Minerals (THERAPEUTIC MULTIVITAMIN-MINERALS) tablet Take 1 tablet by mouth daily      b complex vitamins capsule Take 1 capsule by mouth daily       Current Facility-Administered Medications on File Prior to Encounter   Medication Dose Route Frequency Provider Last Rate Last Admin    triamcinolone acetonide (KENALOG) injection 4 mg  4 mg Intra-LESional Once Alyssa Burgos PA-C           REVIEW OF SYSTEMS    Pertinent items are noted in HPI.     Objective:      BP (!) 154/84   Pulse 89   Temp 97.8 °F (36.6 °C) (Tympanic)   Resp 18   Ht 6' (1.829 m)   Wt (!) 309 lb (140.2 kg)   BMI 41.91 kg/m²     Wt Readings from Last 3 Encounters:   02/03/23 (!) 309 lb (140.2 kg)   01/24/23 (!) 309 lb 12.8 oz (140.5 kg)   01/13/23 300 lb (136.1 kg)       PHYSICAL EXAM    General Appearance: alert and oriented to person, place and time, well developed and well- nourished, in no acute distress  Skin: warm and dry, no rash or erythema  Head: normocephalic and atraumatic  Eyes: pupils equal, round, and reactive to light, conjunctivae normal  Neck: supple and non-tender   Abdomen: soft, non-distended  Extremities: no cyanosis, clubbing or edema  Neurologic:  no cranial nerve deficit,  coordination and speech normal      Assessment:     Problem List Items Addressed This Visit       Nonhealing open wound of groin - Primary    Relevant Medications    lidocaine (XYLOCAINE) 4 % external solution    Other Relevant Orders    Initiate Outpatient Wound Care Protocol    Personal history of smoking    Relevant Medications    lidocaine (XYLOCAINE) 4 % external solution    Other Relevant Orders    Initiate Outpatient Wound Care Protocol               Wound 11/17/22 Scrotum Left #1 (Active)   Wound Image   02/03/23 0819   Wound Etiology Surgical 01/20/23 0928   Dressing Status Old drainage noted;New drainage noted 01/20/23 0928   Wound Cleansed Cleansed with saline 01/20/23 0928   Dressing/Treatment Other (comment) 01/20/23 1009   Wound Length (cm) 0 cm 02/03/23 0819   Wound Width (cm) 0 cm 02/03/23 0819   Wound Depth (cm) 0 cm 02/03/23 0819   Wound Surface Area (cm^2) 0 cm^2 02/03/23 0819   Change in Wound Size % (l*w) 100 02/03/23 0819   Wound Volume (cm^3) 0 cm^3 02/03/23 0819   Wound Healing % 100 02/03/23 0819   Post-Procedure Length (cm) 0 cm 02/03/23 0819   Post-Procedure Width (cm) 0 cm 02/03/23 0819   Post-Procedure Depth (cm) 0 cm 02/03/23 0819   Post-Procedure Surface Area (cm^2) 0 cm^2 02/03/23 0819   Post-Procedure Volume (cm^3) 0 cm^3 02/03/23 0819   Wound Assessment Pink/red 01/20/23 0928   Drainage Amount Moderate 01/20/23 0928   Drainage Description Serous 01/20/23 0928   Odor None 01/20/23 0928   Kavitha-wound Assessment Intact 01/20/23 0928   Margins Defined edges 01/20/23 0928   Wound Thickness Description not for Pressure Injury Full thickness 01/20/23 9013   Number of days: 77          Plan:     Follow-up with dermatology for hidradenitis suppurativa. Treatment Note please see Discharge Instructions    Written patient dismissal instructions given to patient and signed by patient or POA.              Electronically signed by Bel Wells MD on 2/3/2023 at 8:46 AM

## 2023-02-03 NOTE — LETTER
[facility]  [address] [facility] [address]  Phone: [phone]             February 3, 2023    Patient: Tr Connolly   YOB: 1965   Date of Visit: 2/3/2023       To Whom It May Concern:    Sena Freeman was seen and treated in our facility beginning 12/2/222 until 2/3/23  . He may return to work on  on 2/3/23. No restrictions.       Sincerely,       Dr. Ulises Montoya MD        Signature:__________________________________

## 2023-02-03 NOTE — PROGRESS NOTES
Chief Complaint   Patient presents with    Establish Care    Wound Check     GROIN AREA/GOES TO WOUND CARE/OFF WORK FOR THE LAST 2 MONTHS/HAS BEEN CLEARED BY WOUND CARE DOCTOR         [address]  here today for follow up on chronic medical problems, go over labs and/or diagnostic studies, and medication refills. Establish Care and Wound Check (GROIN AREA/GOES TO WOUND CARE/OFF WORK FOR THE LAST 2 MONTHS/HAS BEEN CLEARED BY WOUND CARE DOCTOR)      HPI: Patient is scheduled to establish and scheduled follow-up appointment for chronic medical conditions. Patient has history of diabetes, uncontrolled, since last 1 year is currently on metformin and also glipizide. He is on 2000 mg of metformin and 10 mg of glipizide. He was also on Rybelsus reports he is not taking that because insurance is not covering. His last A1c is 9.1. Patient reports he does not monitor his blood sugars, has not seen ophthalmologist.  Patient reports he has lost his blood glucose monitor and now he will start checking his blood sugars. Patient is reluctant in changing medications, reports he has started his nutrition program and he wants to try that before he increase the medications. He wants to try lifestyle changes and losing weight in addition to medications what he is taking currently. He had nonhealing wound in groin area due to history of hidradenitis was following with wound clinic. Patient reports he has been off from work since last 3 months and has been released to work today. Wound has healed completely, he is off from antibiotics. Patient works as a . Hypertension uncontrolled is currently on lisinopril 5 mg, does not watch his diet. Patient denies any chest pain shortness of breath. He reports compliance with medications. He is also taking hydrochlorothiazide 25 mg.       Patient also has history of lung nodule, had CT chest done in December 22 ,that showed spiculated lung nodule on left middle lobe. Patient needs repeat CT chest in 3 months which is ordered today and encourage patient to schedule that. Patient still smokes, reports he will start Chantix. He has not started that yet. Has obstructive sleep apnea and is on BiPAP follows with pulmonologist Dr. Wellington Villanueva. He is also on inhalers for COPD. CT chest results  Impression   No interval change in 13 mm slightly spiculated nodule medial left lung base   from most recent study. No new nodules. Recommend follow-up CT in 3 months   and or CT PET evaluation     Hyperlipidemia, on statins Lipitor 20 mg reports compliance denies any side effects. Patient is up-to-date on blood work. The 10-year CVD risk score (FILI'Agostino, et al., 2008) is: 50.9%    Values used to calculate the score:      Age: 65 years      Sex: Male      Diabetic: Yes      Tobacco smoker: Yes      Systolic Blood Pressure: 687 mmHg      Is BP treated: Yes      HDL Cholesterol: 35 mg/dL      Total Cholesterol: 141 mg/dL          BP (!) 158/92   Pulse 75   Temp 98.6 °F (37 °C)   Ht 6' (1.829 m)   Wt (!) 303 lb 9.6 oz (137.7 kg)   SpO2 96%   BMI 41.18 kg/m²    Body mass index is 41.18 kg/m². Wt Readings from Last 3 Encounters:   02/03/23 (!) 303 lb 9.6 oz (137.7 kg)   02/03/23 (!) 309 lb (140.2 kg)   01/24/23 (!) 309 lb 12.8 oz (140.5 kg)        [x]Negative depression screening. PHQ Scores 2/3/2023 6/20/2022 6/11/2021 3/5/2019 8/23/2018 5/15/2017 4/10/2017   PHQ2 Score 0 0 0 0 0 0 0   PHQ9 Score 0 0 0 0 0 0 0      []1-4 = Minimal depression   []5-9 = Milddepression   []10-14 = Moderate depression   []15-19 = Moderately severe depression   []20-27 = Severe depression    Discussed testing with the patient and all questions fully answered.     Hospital Outpatient Visit on 12/12/2022   Component Date Value Ref Range Status    Cholesterol 12/12/2022 141  <200 mg/dL Final    Comment:    Cholesterol Guidelines:      <200  Desirable   200-240  Borderline      >240 Undesirable         HDL 12/12/2022 35 (A)  >40 mg/dL Final    Comment:    HDL Guidelines:    <40     Undesirable   40-59    Borderline    >59     Desirable         LDL Cholesterol 12/12/2022 83  0 - 130 mg/dL Final    Comment:    LDL Guidelines:     <100    Desirable   100-129   Near to/above Desirable   130-159   Borderline      >159   Undesirable     Direct (measured) LDL and calculated LDL are not interchangeable tests. Chol/HDL Ratio 12/12/2022 4.0  <5 Final            Triglycerides 12/12/2022 113  <150 mg/dL Final    Comment:    Triglyceride Guidelines:     <150   Desirable   150-199  Borderline   200-499  High     >499   Very high   Based on AHA Guidelines for fasting triglyceride, October 2012. Vitamin B-12 12/12/2022 624  232 - 1245 pg/mL Final    Folate 12/12/2022 16.3  >4.8 ng/mL Final    TSH 12/12/2022 0.85  0.30 - 5.00 uIU/mL Final    Magnesium 12/12/2022 1.6  1.6 - 2.6 mg/dL Final    Glucose 12/12/2022 223 (A)  70 - 99 mg/dL Final    BUN 12/12/2022 21 (A)  6 - 20 mg/dL Final    Creatinine 12/12/2022 1.07  0.70 - 1.20 mg/dL Final    Est, Glom Filt Rate 12/12/2022 >60  >60 mL/min/1.73m2 Final    Comment:       Effective Oct 3, 2022        These results are not intended for use in patients <25years of age. eGFR results are calculated without a race factor using the 2021 CKD-EPI equation. Careful clinical correlation is recommended, particularly when comparing to results   calculated using previous equations. The CKD-EPI equation is less accurate in patients with extremes of muscle mass, extra-renal   metabolism of creatine, excessive creatine ingestion, or following therapy that affects   renal tubular secretion.       Calcium 12/12/2022 9.1  8.6 - 10.4 mg/dL Final    Sodium 12/12/2022 135  135 - 144 mmol/L Final    Potassium 12/12/2022 4.3  3.7 - 5.3 mmol/L Final    Chloride 12/12/2022 103  98 - 107 mmol/L Final    CO2 12/12/2022 23  20 - 31 mmol/L Final    Anion Gap 12/12/2022 9  9 - 17 mmol/L Final    Alkaline Phosphatase 12/12/2022 99  40 - 129 U/L Final    ALT 12/12/2022 18  5 - 41 U/L Final    AST 12/12/2022 13  <40 U/L Final    Total Bilirubin 12/12/2022 0.3  0.3 - 1.2 mg/dL Final    Total Protein 12/12/2022 7.3  6.4 - 8.3 g/dL Final    Albumin 12/12/2022 3.6  3.5 - 5.2 g/dL Final    WBC 12/12/2022 10.9  3.5 - 11.0 k/uL Final    RBC 12/12/2022 4.91  4.5 - 5.9 m/uL Final    Hemoglobin 12/12/2022 14.0  13.5 - 17.5 g/dL Final    Hematocrit 12/12/2022 41.2  41 - 53 % Final    MCV 12/12/2022 83.9  80 - 100 fL Final    MCH 12/12/2022 28.6  26 - 34 pg Final    MCHC 12/12/2022 34.1  31 - 37 g/dL Final    RDW 12/12/2022 15.3 (A)  11.5 - 14.9 % Final    Platelets 50/15/3498 209  150 - 450 k/uL Final    MPV 12/12/2022 7.8  6.0 - 12.0 fL Final    Seg Neutrophils 12/12/2022 74 (A)  36 - 66 % Final    Lymphocytes 12/12/2022 17 (A)  24 - 44 % Final    Monocytes 12/12/2022 6  1 - 7 % Final    Eosinophils % 12/12/2022 2  0 - 4 % Final    Basophils 12/12/2022 1  0 - 2 % Final    Segs Absolute 12/12/2022 8.20  1.3 - 9.1 k/uL Final    Absolute Lymph # 12/12/2022 1.80  1.0 - 4.8 k/uL Final    Absolute Mono # 12/12/2022 0.70  0.1 - 1.3 k/uL Final    Absolute Eos # 12/12/2022 0.20  0.0 - 0.4 k/uL Final    Basophils Absolute 12/12/2022 0.00  0.0 - 0.2 k/uL Final    Hep B S Ab 12/12/2022 <3.50  <10 mIU/mL Final    Comment:       REFERENCE RANGE:  <10.0      NON-REACTIVE/NOT IMMUNE  >=10.0     REACTIVE/IMMUNE      Hemoglobin A1C 12/12/2022 9.1 (A)  4.0 - 6.0 % Final    Estimated Avg Glucose 12/12/2022 214  mg/dL Final    Comment: The ADA and AACC recommend providing the estimated average glucose result to permit better   patient understanding of their HBA1c result.            Most recent labs reviewed:     Lab Results   Component Value Date    WBC 10.9 12/12/2022    HGB 14.0 12/12/2022    HCT 41.2 12/12/2022    MCV 83.9 12/12/2022     12/12/2022       @BRIEFLAB(NA,K,CL,CO2,BUN,CREATININE,GLUCOSE,CALCIUM)@ Lab Results   Component Value Date    ALT 18 12/12/2022    AST 13 12/12/2022    ALKPHOS 99 12/12/2022    BILITOT 0.3 12/12/2022       Lab Results   Component Value Date    TSH 0.85 12/12/2022       Lab Results   Component Value Date    CHOL 141 12/12/2022    CHOL 181 05/15/2017    CHOL 180 06/03/2015     Lab Results   Component Value Date    TRIG 113 12/12/2022    TRIG 186 (H) 05/15/2017    TRIG 154 (H) 06/03/2015     Lab Results   Component Value Date    HDL 35 (L) 12/12/2022    HDL 34 (L) 07/31/2021    HDL 31 (L) 05/15/2017     Lab Results   Component Value Date    LDLCHOLESTEROL 83 12/12/2022    LDLCHOLESTEROL 37 07/31/2021    LDLCHOLESTEROL 113 05/15/2017     Lab Results   Component Value Date    VLDL NOT REPORTED (H) 07/31/2021    VLDL NOT REPORTED 05/15/2017    VLDL NOT REPORTED 06/03/2015     Lab Results   Component Value Date    CHOLHDLRATIO 4.0 12/12/2022    CHOLHDLRATIO 3.4 07/31/2021    CHOLHDLRATIO 5.8 (H) 05/15/2017       Lab Results   Component Value Date    LABA1C 9.1 (H) 12/12/2022       Lab Results   Component Value Date    QMVCNZHB19 624 12/12/2022       Lab Results   Component Value Date    FOLATE 16.3 12/12/2022       No results found for: IRON, TIBC, FERRITIN    Lab Results   Component Value Date    VITD25 20.3 (L) 07/31/2021             Current Outpatient Medications   Medication Sig Dispense Refill    lisinopril (PRINIVIL;ZESTRIL) 10 MG tablet Take 1 tablet by mouth once daily 90 tablet 1    glipiZIDE (GLUCOTROL) 10 MG tablet Take 1 tablet by mouth 2 times daily (before meals) 180 tablet 1    hydroCHLOROthiazide (HYDRODIURIL) 25 MG tablet Take 1 tablet by mouth once daily 90 tablet 0    atorvastatin (LIPITOR) 20 MG tablet Take 1 tablet by mouth once daily 90 tablet 0    metFORMIN (GLUCOPHAGE) 1000 MG tablet TAKE 1 TABLET BY MOUTH TWICE DAILY WITH MEALS 180 tablet 2    aspirin 81 MG chewable tablet Take 1 tablet by mouth daily 90 tablet 5    Multiple Vitamins-Minerals (THERAPEUTIC MULTIVITAMIN-MINERALS) tablet Take 1 tablet by mouth daily      b complex vitamins capsule Take 1 capsule by mouth daily      clindamycin (CLEOCIN T) 1 % lotion Apply to affected areas daily (Patient not taking: Reported on 2/3/2023) 60 mL 3    varenicline (CHANTIX STARTING MONTH JENNIE) 0.5 MG X 11 & 1 MG X 42 tablet 0.5 mg po daily x 3 days, 0.5 mg BID x 4 days, then 1 mg BID thereafter . Call for refill (Patient not taking: Reported on 2/3/2023) 53 each 0    blood glucose monitor strips Testing twice a day. Please dispense according to patients insurance.  (Patient not taking: Reported on 2/3/2023) 200 strip 3    albuterol sulfate HFA (PROVENTIL;VENTOLIN;PROAIR) 108 (90 Base) MCG/ACT inhaler Inhale 2 puffs into the lungs every 6 hours as needed for Wheezing 9 each 0    acetaminophen (TYLENOL) 500 MG tablet Take 1 tablet by mouth every 6 hours as needed for Pain (Take 1-2 tablets every 6 hours) 120 tablet 1     Current Facility-Administered Medications   Medication Dose Route Frequency Provider Last Rate Last Admin    triamcinolone acetonide (KENALOG) injection 4 mg  4 mg Intra-LESional Once Susan Salmeron PA-C         Facility-Administered Medications Ordered in Other Visits   Medication Dose Route Frequency Provider Last Rate Last Admin    lidocaine (XYLOCAINE) 4 % external solution   Topical Once Lex Cat MD                 Social History     Socioeconomic History    Marital status: Single     Spouse name: Not on file    Number of children: Not on file    Years of education: Not on file    Highest education level: Not on file   Occupational History    Not on file   Tobacco Use    Smoking status: Every Day     Packs/day: 2.00     Years: 15.00     Pack years: 30.00     Types: Cigarettes    Smokeless tobacco: Never   Vaping Use    Vaping Use: Never used   Substance and Sexual Activity    Alcohol use: No    Drug use: No    Sexual activity: Not Currently   Other Topics Concern    Not on file   Social History Narrative Not on file     Social Determinants of Health     Financial Resource Strain: Low Risk     Difficulty of Paying Living Expenses: Not hard at all   Food Insecurity: No Food Insecurity    Worried About [address] in the Last Year: Never true    [address] in the Last Year: Never true   Transportation Needs: Unknown    Lack of Transportation (Medical): Not on file    Lack of Transportation (Non-Medical): No   Physical Activity: Not on file   Stress: Not on file   Social Connections: Not on file   Intimate Partner Violence: Not on file   Housing Stability: Unknown    Unable to Pay for Housing in the Last Year: Not on file    Number of Places Lived in the Last Year: Not on file    Unstable Housing in the Last Year: No     Ready to quit: Not Answered  Counseling given: Not Answered        Family History   Problem Relation Age of Onset    Diabetes Father     High Blood Pressure Father              -rest of complaints with corresponding details per ROS    The patient's past medical, surgical, social, and family history as well as his current medications and allergies were reviewed as documented intoday's encounter. Review of Systems   Constitutional:  Positive for activity change and unexpected weight change. Negative for appetite change, fatigue and fever. HENT:  Negative for congestion, ear pain, postnasal drip, rhinorrhea, sinus pressure, sore throat and trouble swallowing. Eyes:  Negative for redness and visual disturbance. Respiratory:  Positive for wheezing. Negative for cough, chest tightness, shortness of breath and stridor. Cardiovascular:  Negative for chest pain, palpitations and leg swelling. Gastrointestinal:  Negative for abdominal distention, abdominal pain, blood in stool, constipation, diarrhea, nausea, rectal pain and vomiting. Endocrine: Negative for polydipsia, polyphagia and polyuria.    Genitourinary:  Negative for difficulty urinating, flank pain, frequency and urgency. Musculoskeletal:  Negative for arthralgias, back pain, gait problem, myalgias and neck pain. Skin:  Positive for color change and wound. Negative for rash. Allergic/Immunologic: Positive for immunocompromised state. Negative for food allergies. Neurological:  Positive for weakness and numbness. Negative for dizziness, speech difficulty, light-headedness and headaches. Psychiatric/Behavioral:  Negative for agitation, behavioral problems, decreased concentration, dysphoric mood, hallucinations, sleep disturbance and suicidal ideas. The patient is nervous/anxious. Physical Exam  Vitals and nursing note reviewed. Constitutional:       General: He is not in acute distress. Appearance: Normal appearance. He is well-developed. He is obese. He is not diaphoretic. HENT:      Head: Normocephalic and atraumatic. Nose: Nose normal.   Eyes:      General:         Right eye: No discharge. Left eye: No discharge. Extraocular Movements: Extraocular movements intact. Conjunctiva/sclera: Conjunctivae normal.      Pupils: Pupils are equal, round, and reactive to light. Neck:      Thyroid: No thyromegaly. Cardiovascular:      Rate and Rhythm: Normal rate and regular rhythm. Heart sounds: Normal heart sounds. No murmur heard. Pulmonary:      Effort: Pulmonary effort is normal. No respiratory distress. Breath sounds: Examination of the right-middle field reveals wheezing. Examination of the left-middle field reveals wheezing and rhonchi. Wheezing and rhonchi present. Abdominal:      General: Bowel sounds are normal. There is no distension. Palpations: Abdomen is soft. There is no mass. Tenderness: There is no abdominal tenderness. There is no right CVA tenderness, left CVA tenderness, guarding or rebound. Musculoskeletal:         General: No tenderness. Cervical back: Normal range of motion and neck supple. No rigidity or spasms.       Thoracic back: No spasms. Normal range of motion. Lumbar back: No spasms. Decreased range of motion. Negative right straight leg raise test and negative left straight leg raise test.      Right lower leg: No edema. Left lower leg: No edema. Lymphadenopathy:      Cervical: No cervical adenopathy. Skin:     Coloration: Skin is not jaundiced or pale. Findings: No bruising, erythema or rash. Neurological:      General: No focal deficit present. Mental Status: He is alert and oriented to person, place, and time. Cranial Nerves: No cranial nerve deficit. Sensory: No sensory deficit. Motor: No weakness or tremor. Coordination: Coordination normal.      Gait: Gait and tandem walk normal.      Deep Tendon Reflexes: Reflexes are normal and symmetric. Psychiatric:         Attention and Perception: Attention and perception normal. He is attentive. Mood and Affect: Mood is anxious. Mood is not depressed. Affect is not tearful. Speech: He is communicative. Speech is not rapid and pressured, delayed or slurred. Behavior: Behavior normal. Behavior is not agitated or slowed. Thought Content: Thought content normal. Thought content is not paranoid. Judgment: Judgment normal.           ASSESSMENT AND PLAN      1. Type 2 diabetes mellitus with diabetic polyneuropathy, without long-term current use of insulin ([address]  Uncontrolled had a long discussion with patient about the diabetes and its complications. Patient does not want to start any new medication, he wants to try lifestyle medicine and change his diet for 3 months. Continue metformin 2000 mg, increase glipizide twice a day. Advised to monitor blood sugars and call back. - glipiZIDE (GLUCOTROL) 10 MG tablet; Take 1 tablet by mouth 2 times daily (before meals)  Dispense: 180 tablet; Refill: 1    2.  Nonhealing open wound of groin  Wound has healed, done with antibiotics cleared for work with wound clinic. 3. Lung nodule  Recently diagnosed, CT chest ordered and encourage patient to schedule in 3 months.  - CT CHEST W CONTRAST; Future    4. MIGUEL treated with BiPAP  Chronic problem is currently on BiPAP, follow-up with pulmonologist  Be compliant with BiPAP  Discussed about weight reduction    5. Mixed hyperlipidemia  Controlled continue statins  Increase physical activity  Encourage about weight reduction  Recommended low-carb low-fat diet    6. Personal history of smoking  Encourage patient to quit smoking, will start Chantix. 7. Essential hypertension  Uncontrolled, increase lisinopril to 10 mg continue hydrochlorothiazide. Discussed home monitoring of blood pressure call back with readings. - lisinopril (PRINIVIL;ZESTRIL) 10 MG tablet; Take 1 tablet by mouth once daily  Dispense: 90 tablet; Refill: 1    Orders Placed This Encounter   Procedures    CT CHEST W CONTRAST     Standing Status:   Future     Standing Expiration Date:   2/3/2024     Order Specific Question:   STAT Creatinine as needed:     Answer:   Yes     Order Specific Question:   Reason for exam:     Answer:   follow up         Medications Discontinued During This Encounter   Medication Reason    doxycycline hyclate (VIBRAMYCIN) 100 MG capsule Therapy completed    benzoyl peroxide 5 % external liquid Therapy completed    Semaglutide 3 MG TABS Cost of medication    lisinopril (PRINIVIL;ZESTRIL) 5 MG tablet REORDER    glipiZIDE (GLUCOTROL) 10 MG tablet REORDER       Nathan received counseling on the following healthy behaviors: nutrition, exercise, medication adherence, and tobacco cessation  Reviewed prior labs and health maintenance  Continue current medications, diet and exercise. Discussed use, benefit, and side effects of prescribed medications. Barriers to medication compliance addressed. Patient given educational materials - see patient instructions  Was a self-tracking handout given in paper form or via [facility]? Yes    Requested Prescriptions     Signed Prescriptions Disp Refills    lisinopril (PRINIVIL;ZESTRIL) 10 MG tablet 90 tablet 1     Sig: Take 1 tablet by mouth once daily    glipiZIDE (GLUCOTROL) 10 MG tablet 180 tablet 1     Sig: Take 1 tablet by mouth 2 times daily (before meals)       All patient questions answered. Patient voiced understanding. Quality Measures    Body mass index is 41.18 kg/m². Elevated. Weight control planned discussed daily exercise regimen and Healthy diet and regular exercise. BP: (!) 148/72 Blood pressure is high. Treatment plan consists of Weight Reduction, DASH Eating Plan, Dietary Sodium Restriction, Increased Physical Activity, Moderation in Alcohol Consumption, and No treatment change needed. Lab Results   Component Value Date    LDLCHOLESTEROL 83 12/12/2022    (goal LDL reduction with dx if diabetes is 50% LDL reduction)      PHQ Scores 2/3/2023 6/20/2022 6/11/2021 3/5/2019 8/23/2018 5/15/2017 4/10/2017   PHQ2 Score 0 0 0 0 0 0 0   PHQ9 Score 0 0 0 0 0 0 0     Interpretation of Total Score Depression Severity: 1-4 = Minimal depression, 5-9 = Mild depression, 10-14 = Moderate depression, 15-19 = Moderately severe depression, 20-27 = Severe depression    The patient'spast medical, surgical, social, and family history as well as his   current medications and allergies were reviewed as documented in today's encounter. Medications, labs, diagnostic studies, consultations andfollow-up as documented in this encounter. Return in about 3 months (around 5/3/2023) for 30min,always chronic conditons, dm ,htn, hld, CT results. Patient wasseen with total face to face time of 35 minutes. More than 50% of this visit was counseling and education.        Future Appointments   Date Time Provider Luz Maria Spence   4/25/2023  2:00 PM TODD Childs [facility]     This note was completed by using the assistance of a speech-recognition program. However, inadvertent computerized transcription errors may be present. Althoughevery effort was made to ensure accuracy, no guarantees can be provided that every mistake has been identified and corrected by editing.   Electronically signed by Effie Arita MD on 2/3/2023  10:03 AM

## 2023-02-03 NOTE — PATIENT INSTRUCTIONS
New Updates for [id number]/ [facility] ([facility]) BRANDEN    Thank you for choosing [facility] to give you the best care! [facility] ([facility]) is always trying to think of new ways to help their patients. We are asking all patients to try out the new digital registration that is now available through your [facility] account or the new [facility], [facility] ([facility]). Via the branden you're now able to update your personal and registration information prior to your upcoming appointment. This will save you time once you arrive at the office to check-in, not to mention your information remains safe!! Many other perks come from signing up for an account, such as:  Requesting refills  Scheduling an appointment  Completing an E-Visit  Sending a message to the office/provider  Having access to your medication list  Paying your bill/copay prior to your appointment  Scheduling your yearly mammogram  Review your test results    If you are not familiar with [facility] or the [facility] ([facility]) BRANDEN, please ask one of us and we will be happy to answer any questions or help you set-up your account.       Your [id number] [employer] office,  [place]

## 2023-02-03 NOTE — PLAN OF CARE
Problem: Chronic Conditions and Co-morbidities  Goal: Patient's chronic conditions and co-morbidity symptoms are monitored and maintained or improved  Outcome: Completed     Problem: Discharge Planning  Goal: Discharge to home or other facility with appropriate resources  Outcome: Completed     Problem: ABCDS Injury Assessment  Goal: Absence of physical injury  Outcome: Completed  Flowsheets (Taken 2/3/2023 0814 by Daly Corley RN)  Absence of Physical Injury: Implement safety measures based on patient assessment

## 2023-02-07 ENCOUNTER — TELEPHONE (OUTPATIENT)
Dept: FAMILY MEDICINE CLINIC | Age: 58
End: 2023-02-07

## 2023-02-07 NOTE — TELEPHONE ENCOUNTER
LMOM TO CALL BACK LOOKING FOR EYE DOCTOR INFORMATION/WHO/WHERE HE GOES FOR EXAMS DUE TO [facility]    WILL REQUEST RECORDS IF PATIENT IS ESTABLISHED OF IF PT NEEDS REFERRAL CAN SEND REQUEST

## 2023-11-15 ENCOUNTER — OFFICE VISIT (OUTPATIENT)
Dept: FAMILY MEDICINE CLINIC | Age: 58
End: 2023-11-15

## 2023-11-15 VITALS
SYSTOLIC BLOOD PRESSURE: 130 MMHG | BODY MASS INDEX: 40.95 KG/M2 | OXYGEN SATURATION: 98 % | WEIGHT: 309 LBS | HEART RATE: 92 BPM | HEIGHT: 73 IN | DIASTOLIC BLOOD PRESSURE: 80 MMHG

## 2023-11-15 DIAGNOSIS — I10 ESSENTIAL HYPERTENSION: ICD-10-CM

## 2023-11-15 DIAGNOSIS — Z23 ENCOUNTER FOR IMMUNIZATION: ICD-10-CM

## 2023-11-15 DIAGNOSIS — N18.31 STAGE 3A CHRONIC KIDNEY DISEASE (HCC): ICD-10-CM

## 2023-11-15 DIAGNOSIS — E78.2 MIXED HYPERLIPIDEMIA: ICD-10-CM

## 2023-11-15 DIAGNOSIS — R93.89 ABNORMAL CT OF THE CHEST: ICD-10-CM

## 2023-11-15 DIAGNOSIS — M13.0 ARTHRITIS OF MULTIPLE SITES: ICD-10-CM

## 2023-11-15 DIAGNOSIS — G47.33 OSA TREATED WITH BIPAP: Chronic | ICD-10-CM

## 2023-11-15 DIAGNOSIS — Z12.5 PROSTATE CANCER SCREENING: ICD-10-CM

## 2023-11-15 DIAGNOSIS — R91.1 LUNG NODULE: ICD-10-CM

## 2023-11-15 DIAGNOSIS — E66.01 OBESITY, CLASS III, BMI 40-49.9 (MORBID OBESITY) (HCC): ICD-10-CM

## 2023-11-15 DIAGNOSIS — I73.9 PVD (PERIPHERAL VASCULAR DISEASE) (HCC): ICD-10-CM

## 2023-11-15 DIAGNOSIS — E11.42 TYPE 2 DIABETES MELLITUS WITH DIABETIC POLYNEUROPATHY, WITHOUT LONG-TERM CURRENT USE OF INSULIN (HCC): Primary | ICD-10-CM

## 2023-11-15 PROBLEM — E72.11 HYPERHOMOCYSTEINEMIA (HCC): Status: RESOLVED | Noted: 2017-11-27 | Resolved: 2023-11-15

## 2023-11-15 PROBLEM — B37.49 CANDIDIASIS OF PERINEUM: Status: RESOLVED | Noted: 2023-01-13 | Resolved: 2023-11-15

## 2023-11-15 PROBLEM — E78.6 LOW HDL (UNDER 40): Status: RESOLVED | Noted: 2017-06-19 | Resolved: 2023-11-15

## 2023-11-15 PROBLEM — N45.4 TESTICULAR ABSCESS: Status: RESOLVED | Noted: 2022-11-16 | Resolved: 2023-11-15

## 2023-11-15 PROBLEM — D68.59 HYPERCOAGULABLE STATE (HCC): Status: RESOLVED | Noted: 2017-10-16 | Resolved: 2023-11-15

## 2023-11-15 LAB — HBA1C MFR BLD: 8.3 %

## 2023-11-15 PROCEDURE — 99214 OFFICE O/P EST MOD 30 MIN: CPT | Performed by: FAMILY MEDICINE

## 2023-11-15 PROCEDURE — 83036 HEMOGLOBIN GLYCOSYLATED A1C: CPT | Performed by: FAMILY MEDICINE

## 2023-11-15 PROCEDURE — 3079F DIAST BP 80-89 MM HG: CPT | Performed by: FAMILY MEDICINE

## 2023-11-15 PROCEDURE — 3075F SYST BP GE 130 - 139MM HG: CPT | Performed by: FAMILY MEDICINE

## 2023-11-15 PROCEDURE — 3052F HG A1C>EQUAL 8.0%<EQUAL 9.0%: CPT | Performed by: FAMILY MEDICINE

## 2023-11-15 RX ORDER — ATORVASTATIN CALCIUM 20 MG/1
20 TABLET, FILM COATED ORAL DAILY
Qty: 90 TABLET | Refills: 3 | Status: SHIPPED | OUTPATIENT
Start: 2023-11-15

## 2023-11-15 RX ORDER — LISINOPRIL 10 MG/1
TABLET ORAL
Qty: 90 TABLET | Refills: 3 | Status: SHIPPED | OUTPATIENT
Start: 2023-11-15

## 2023-11-15 RX ORDER — HYDROCHLOROTHIAZIDE 25 MG/1
25 TABLET ORAL DAILY
Qty: 90 TABLET | Refills: 3 | Status: SHIPPED | OUTPATIENT
Start: 2023-11-15

## 2023-11-15 RX ORDER — GLIPIZIDE 10 MG/1
10 TABLET ORAL
Qty: 180 TABLET | Refills: 3 | Status: SHIPPED | OUTPATIENT
Start: 2023-11-15

## 2023-11-15 RX ORDER — ASPIRIN 81 MG/1
81 TABLET, CHEWABLE ORAL DAILY
Qty: 90 TABLET | Refills: 5 | Status: SHIPPED | OUTPATIENT
Start: 2023-11-15

## 2023-11-15 RX ORDER — GLUCOSAMINE HCL/CHONDROITIN SU 500-400 MG
CAPSULE ORAL
Qty: 100 STRIP | Refills: 3 | Status: SHIPPED | OUTPATIENT
Start: 2023-11-15

## 2023-11-15 RX ORDER — LANCETS 30 GAUGE
EACH MISCELLANEOUS
Qty: 100 EACH | Refills: 3 | Status: SHIPPED | OUTPATIENT
Start: 2023-11-15

## 2023-11-15 SDOH — ECONOMIC STABILITY: FOOD INSECURITY: WITHIN THE PAST 12 MONTHS, THE FOOD YOU BOUGHT JUST DIDN'T LAST AND YOU DIDN'T HAVE MONEY TO GET MORE.: NEVER TRUE

## 2023-11-15 SDOH — ECONOMIC STABILITY: INCOME INSECURITY: HOW HARD IS IT FOR YOU TO PAY FOR THE VERY BASICS LIKE FOOD, HOUSING, MEDICAL CARE, AND HEATING?: NOT HARD AT ALL

## 2023-11-15 SDOH — ECONOMIC STABILITY: FOOD INSECURITY: WITHIN THE PAST 12 MONTHS, YOU WORRIED THAT YOUR FOOD WOULD RUN OUT BEFORE YOU GOT MONEY TO BUY MORE.: NEVER TRUE

## 2023-11-15 ASSESSMENT — ENCOUNTER SYMPTOMS
SHORTNESS OF BREATH: 1
RECTAL PAIN: 0
SORE THROAT: 0
BACK PAIN: 1
CHEST TIGHTNESS: 0
ABDOMINAL DISTENTION: 0
COUGH: 0
NAUSEA: 0
BLOOD IN STOOL: 0
WHEEZING: 0
STRIDOR: 0
VOMITING: 0
ABDOMINAL PAIN: 0
RHINORRHEA: 0
TROUBLE SWALLOWING: 0
SINUS PRESSURE: 0
COLOR CHANGE: 0
DIARRHEA: 0
EYE REDNESS: 0
CONSTIPATION: 0

## 2023-11-15 NOTE — PATIENT INSTRUCTIONS
Thank you for choosing [facility]) as your healthcare provider as your care is important to us. Please arrive at your appointment on time. If you are unable to make your appointment, please call our office as soon as possible so that we may reschedule your appointment. Missing 3 appointments in a calendar year without notifying the office may lead to dismissal from the practice. We appreciate you calling at least 24 hours in advance, when possible. Thank you. New Updates for [facility]    Thank you for choosing US to give you the best care! [facility]) is always trying to think of new ways to help their patients. We are asking all patients to try out the new digital registration that is now available through your [facility] account Via the branden you're now able to update your personal and registration information prior to your upcoming appointment. This will save you time once you arrive at the office to check-in, not to mention your information remains safe!! Many other perks come from signing up for an account, such as:  Requesting refills  Scheduling an appointment  Completing an E-Visit  Sending a message to the office/provider  Having access to your medication list  Paying your bill/copay prior to your appointment  Scheduling your yearly mammogram  Review your test results    If you are not familiar with [facility] please ask one of us and we will be happy to answer any questions or help you set-up your account.       Your [employer],

## 2024-02-26 ENCOUNTER — HOSPITAL ENCOUNTER (OUTPATIENT)
Dept: CT IMAGING | Age: 59
Discharge: HOME | End: 2024-02-28

## 2024-02-26 DIAGNOSIS — R91.1 SOLITARY PULMONARY NODULE: ICD-10-CM

## 2024-02-26 PROCEDURE — 71250 CT THORAX DX C-: CPT

## 2024-02-27 DIAGNOSIS — R93.89 ABNORMAL CT OF THE CHEST: Primary | ICD-10-CM

## 2024-04-15 ENCOUNTER — HOSPITAL ENCOUNTER (OUTPATIENT)
Dept: NUCLEAR MEDICINE | Age: 59
Discharge: HOME | End: 2024-04-17

## 2024-04-15 DIAGNOSIS — R93.89 ABNORMAL CT OF THE CHEST: ICD-10-CM

## 2024-04-15 RX ORDER — SODIUM CHLORIDE 0.9 % (FLUSH) 0.9 %
10 SYRINGE (ML) INJECTION
Status: DISCONTINUED | OUTPATIENT
Start: 2024-04-15 | End: 2024-04-15

## 2024-04-15 RX ORDER — FLUDEOXYGLUCOSE F 18 200 MCI/ML
10 INJECTION, SOLUTION INTRAVENOUS
Status: DISCONTINUED | OUTPATIENT
Start: 2024-04-15 | End: 2024-04-15

## 2025-06-30 NOTE — PROGRESS NOTES
[facility]  [address] [place] [address]  Phone: [phone]  Fax: [phone]      Date of Visit: 2025   Patient Name: Nathan Lyman   Patient :  1965     ASSESSMENT/PLAN   1. Type 2 diabetes mellitus with other skin complication, without long-term current use of insulin (HCC)  -     CBC with Auto Differential; Future  -     Comprehensive Metabolic Panel; Future  -     Albumin/Creatinine Ratio, Urine; Future  -     Lipid Panel; Future  -     Hemoglobin A1C; Future  2. Encounter for smoking cessation counseling  3. Skin infection  -     doxycycline hyclate (VIBRAMYCIN) 100 MG capsule; Take 1 capsule by mouth 2 times daily for 14 days, Disp-28 capsule, R-0Normal     Plan:  Our plan is the following:  Start Chantix 0.5 for 11 days then 1 mg daily after   Doxycycline 100 mg twice a day 14 days for skin infection   Routine labs to check for Hga1c, lipids, electrolytes, blood count   Follow up in 2 months     Advised patient to quit and offered support.  Educational material provided to patient.  Discussed prior reason for relapse and strategies to overcome this in the future.  Recommended nicotine replacement with patches, reviewed use and dosing.  Prescribed varenicline. Potential side effects and safe use reviewed.  Spent 3-10 minutes counseling patient on smoking cessation, discussing strategies and resources to support the patient in quitting.    Return in about 2 months (around 2025).    COMMUNICATION   Please be aware portions of this note were completed using voice recognition software and unforeseen errors may have occurred.  Questions/concerns answered. Patient verbalized and expressed understanding. Medications, laboratory testing, imaging, consultation, and follow up as documented in this encounter.     Patient was discussed with Gilberto Yang MD.  Electronically signed by Akira Ozuna MD on 7/3/2025 at 11:49

## 2025-06-30 NOTE — PATIENT INSTRUCTIONS
Thank you for following up with us at [facility] office. It was a pleasure to meet you today!     Our plan is the following:  Start Chantix 0.5 for 11 days then 1 mg daily after   Doxycycline 100 mg twice a day 14 days for skin infection   Routine labs to check for Hga1c, lipids, electrolytes, blood count   Follow up in 2 months     Your medication list is included in the after visit summary. If you find any differences when compared to your medications at home, please contact the office ([phone]) to let us know.   You can also call the office if you have any additional questions or concerns and speak to one of the staff. They will triage and forward the message to the provider.   Have a great rest of your day!

## 2025-07-01 ENCOUNTER — OFFICE VISIT (OUTPATIENT)
Age: 60
End: 2025-07-01

## 2025-07-01 VITALS
BODY MASS INDEX: 40.03 KG/M2 | SYSTOLIC BLOOD PRESSURE: 110 MMHG | WEIGHT: 279.6 LBS | RESPIRATION RATE: 16 BRPM | HEART RATE: 79 BPM | HEIGHT: 70 IN | TEMPERATURE: 97.7 F | DIASTOLIC BLOOD PRESSURE: 50 MMHG

## 2025-07-01 DIAGNOSIS — Z71.6 ENCOUNTER FOR SMOKING CESSATION COUNSELING: ICD-10-CM

## 2025-07-01 DIAGNOSIS — E11.628 TYPE 2 DIABETES MELLITUS WITH OTHER SKIN COMPLICATION, WITHOUT LONG-TERM CURRENT USE OF INSULIN (HCC): Primary | ICD-10-CM

## 2025-07-01 DIAGNOSIS — L08.9 SKIN INFECTION: ICD-10-CM

## 2025-07-01 RX ORDER — DOXYCYCLINE 100 MG/1
100 CAPSULE ORAL 2 TIMES DAILY
Qty: 28 CAPSULE | Refills: 0 | Status: SHIPPED | OUTPATIENT
Start: 2025-07-01 | End: 2025-07-15

## 2025-07-01 RX ORDER — VARENICLINE TARTRATE 0.5 (11)-1
KIT ORAL
Qty: 53 EACH | Refills: 0 | Status: SHIPPED | OUTPATIENT
Start: 2025-07-01

## 2025-07-01 RX ORDER — DOXYCYCLINE HYCLATE 100 MG/1
100 TABLET, DELAYED RELEASE ORAL 2 TIMES DAILY
Qty: 28 TABLET | Refills: 0 | Status: SHIPPED | OUTPATIENT
Start: 2025-07-01 | End: 2025-07-01

## 2025-07-01 SDOH — ECONOMIC STABILITY: FOOD INSECURITY: WITHIN THE PAST 12 MONTHS, YOU WORRIED THAT YOUR FOOD WOULD RUN OUT BEFORE YOU GOT MONEY TO BUY MORE.: NEVER TRUE

## 2025-07-01 SDOH — ECONOMIC STABILITY: FOOD INSECURITY: WITHIN THE PAST 12 MONTHS, THE FOOD YOU BOUGHT JUST DIDN'T LAST AND YOU DIDN'T HAVE MONEY TO GET MORE.: NEVER TRUE

## 2025-07-01 ASSESSMENT — PATIENT HEALTH QUESTIONNAIRE - PHQ9
SUM OF ALL RESPONSES TO PHQ QUESTIONS 1-9: 0
2. FEELING DOWN, DEPRESSED OR HOPELESS: NOT AT ALL
SUM OF ALL RESPONSES TO PHQ QUESTIONS 1-9: 0
1. LITTLE INTEREST OR PLEASURE IN DOING THINGS: NOT AT ALL
SUM OF ALL RESPONSES TO PHQ QUESTIONS 1-9: 0
SUM OF ALL RESPONSES TO PHQ QUESTIONS 1-9: 0

## 2025-07-07 ENCOUNTER — HOSPITAL ENCOUNTER (OUTPATIENT)
Age: 60
Setting detail: SPECIMEN
Discharge: HOME | End: 2025-07-07

## 2025-07-07 DIAGNOSIS — E11.628 TYPE 2 DIABETES MELLITUS WITH OTHER SKIN COMPLICATION, WITHOUT LONG-TERM CURRENT USE OF INSULIN (HCC): ICD-10-CM

## 2025-07-07 LAB
ALBUMIN SERPL-MCNC: 3.7 G/DL (ref 3.5–5.2)
ALBUMIN/GLOB SERPL: 0.9 {RATIO} (ref 1–2.5)
ALP SERPL-CCNC: 97 U/L (ref 40–129)
ALT SERPL-CCNC: 12 U/L (ref 10–50)
ANION GAP SERPL CALCULATED.3IONS-SCNC: 13 MMOL/L (ref 9–16)
AST SERPL-CCNC: 11 U/L (ref 10–50)
BASOPHILS # BLD: 0.08 K/UL (ref 0–0.2)
BASOPHILS NFR BLD: 1 % (ref 0–2)
BILIRUB SERPL-MCNC: 0.3 MG/DL (ref 0–1.2)
BUN SERPL-MCNC: 35 MG/DL (ref 6–20)
CALCIUM SERPL-MCNC: 9.4 MG/DL (ref 8.6–10.4)
CHLORIDE SERPL-SCNC: 98 MMOL/L (ref 98–107)
CHOLEST SERPL-MCNC: 159 MG/DL (ref 0–199)
CHOLESTEROL/HDL RATIO: 5
CO2 SERPL-SCNC: 23 MMOL/L (ref 20–31)
CREAT SERPL-MCNC: 1.3 MG/DL (ref 0.7–1.2)
CREAT UR-MCNC: 146 MG/DL (ref 39–259)
EOSINOPHIL # BLD: 0.22 K/UL (ref 0–0.44)
EOSINOPHILS RELATIVE PERCENT: 2 % (ref 1–4)
ERYTHROCYTE [DISTWIDTH] IN BLOOD BY AUTOMATED COUNT: 16.1 % (ref 11.8–14.4)
EST. AVERAGE GLUCOSE BLD GHB EST-MCNC: 303 MG/DL
GFR, ESTIMATED: 64 ML/MIN/1.73M2
GLUCOSE SERPL-MCNC: 340 MG/DL (ref 74–99)
HBA1C MFR BLD: 12.2 % (ref 4–6)
HCT VFR BLD AUTO: 40.5 % (ref 40.7–50.3)
HDLC SERPL-MCNC: 32 MG/DL
HGB BLD-MCNC: 12.9 G/DL (ref 13–17)
IMM GRANULOCYTES # BLD AUTO: 0.11 K/UL (ref 0–0.3)
IMM GRANULOCYTES NFR BLD: 1 %
LDLC SERPL CALC-MCNC: 98 MG/DL (ref 0–100)
LYMPHOCYTES NFR BLD: 2.3 K/UL (ref 1.1–3.7)
LYMPHOCYTES RELATIVE PERCENT: 17 % (ref 24–43)
MCH RBC QN AUTO: 26.9 PG (ref 25.2–33.5)
MCHC RBC AUTO-ENTMCNC: 31.9 G/DL (ref 28.4–34.8)
MCV RBC AUTO: 84.6 FL (ref 82.6–102.9)
MICROALBUMIN UR-MCNC: 272 MG/L (ref 0–20)
MICROALBUMIN/CREAT UR-RTO: 186 MCG/MG CREAT (ref 0–17)
MONOCYTES NFR BLD: 0.7 K/UL (ref 0.1–1.2)
MONOCYTES NFR BLD: 5 % (ref 3–12)
NEUTROPHILS NFR BLD: 74 % (ref 36–65)
NEUTS SEG NFR BLD: 10.01 K/UL (ref 1.5–8.1)
NRBC BLD-RTO: 0 PER 100 WBC
PLATELET # BLD AUTO: 386 K/UL (ref 138–453)
PMV BLD AUTO: 10 FL (ref 8.1–13.5)
POTASSIUM SERPL-SCNC: 4.4 MMOL/L (ref 3.7–5.3)
PROT SERPL-MCNC: 8 G/DL (ref 6.6–8.7)
RBC # BLD AUTO: 4.79 M/UL (ref 4.21–5.77)
RBC # BLD: ABNORMAL 10*6/UL
SODIUM SERPL-SCNC: 134 MMOL/L (ref 136–145)
TRIGL SERPL-MCNC: 147 MG/DL
VLDLC SERPL CALC-MCNC: 29 MG/DL (ref 1–30)
WBC OTHER # BLD: 13.4 K/UL (ref 3.5–11.3)

## 2025-07-18 ENCOUNTER — TELEPHONE (OUTPATIENT)
Age: 60
End: 2025-07-18

## 2025-07-18 NOTE — TELEPHONE ENCOUNTER
Patient called and stated that the antibiotics that he is taking right now is giving him a lot heart burn. Wanted to know if he can get something else?    Also patient would like his lab results    Also patient was stating that his blood sugars has been elevated    7/16-5 am-226 7-8 am -230 , 12 pm-336 7 pm-213    Also he stated that his blood pressure has been running low, but has not been writing them down. Patient will keep track and call back with his numbers.

## 2025-07-22 NOTE — TELEPHONE ENCOUNTER
Tried calling patient left a voicemail for him to call back regarding scheduling an appointment per Dr. Ozuna. Please schedule for an appointment if patient calls back.

## 2025-07-24 NOTE — TELEPHONE ENCOUNTER
Left message for patient to call back to schedule an appt to discuss diabetes mgmt.   Also sent patient an attempt to contact letter

## 2025-08-05 ENCOUNTER — OFFICE VISIT (OUTPATIENT)
Age: 60
End: 2025-08-05
Payer: COMMERCIAL

## 2025-08-05 VITALS
HEART RATE: 141 BPM | HEIGHT: 73 IN | DIASTOLIC BLOOD PRESSURE: 39 MMHG | RESPIRATION RATE: 16 BRPM | TEMPERATURE: 98.2 F | SYSTOLIC BLOOD PRESSURE: 72 MMHG | WEIGHT: 275.7 LBS | BODY MASS INDEX: 36.54 KG/M2

## 2025-08-05 DIAGNOSIS — L97.529 ULCER OF LEFT FOOT, UNSPECIFIED ULCER STAGE (HCC): ICD-10-CM

## 2025-08-05 DIAGNOSIS — N18.2 TYPE 2 DIABETES MELLITUS WITH STAGE 2 CHRONIC KIDNEY DISEASE, WITHOUT LONG-TERM CURRENT USE OF INSULIN (HCC): Primary | ICD-10-CM

## 2025-08-05 DIAGNOSIS — L73.2 HIDRADENITIS SUPPURATIVA: ICD-10-CM

## 2025-08-05 DIAGNOSIS — S31.109D CHRONIC WOUND INFECTION OF ABDOMEN, SUBSEQUENT ENCOUNTER: ICD-10-CM

## 2025-08-05 DIAGNOSIS — I95.1 ORTHOSTATIC HYPOTENSION: ICD-10-CM

## 2025-08-05 DIAGNOSIS — L08.9 CHRONIC WOUND INFECTION OF ABDOMEN, SUBSEQUENT ENCOUNTER: ICD-10-CM

## 2025-08-05 DIAGNOSIS — E11.42 TYPE 2 DIABETES MELLITUS WITH DIABETIC POLYNEUROPATHY, WITHOUT LONG-TERM CURRENT USE OF INSULIN (HCC): ICD-10-CM

## 2025-08-05 DIAGNOSIS — E11.22 TYPE 2 DIABETES MELLITUS WITH STAGE 2 CHRONIC KIDNEY DISEASE, WITHOUT LONG-TERM CURRENT USE OF INSULIN (HCC): Primary | ICD-10-CM

## 2025-08-05 PROCEDURE — 96127 BRIEF EMOTIONAL/BEHAV ASSMT: CPT

## 2025-08-05 PROCEDURE — 3078F DIAST BP <80 MM HG: CPT

## 2025-08-05 PROCEDURE — 99213 OFFICE O/P EST LOW 20 MIN: CPT

## 2025-08-05 PROCEDURE — 3046F HEMOGLOBIN A1C LEVEL >9.0%: CPT

## 2025-08-05 PROCEDURE — 3074F SYST BP LT 130 MM HG: CPT

## 2025-08-05 RX ORDER — CHLORHEXIDINE GLUCONATE 40 MG/ML
SOLUTION TOPICAL DAILY PRN
Qty: 473 ML | Refills: 2 | Status: SHIPPED | OUTPATIENT
Start: 2025-08-05

## 2025-08-05 RX ORDER — CLINDAMYCIN PHOSPHATE 10 UG/ML
LOTION TOPICAL
Qty: 60 ML | Refills: 3 | Status: SHIPPED | OUTPATIENT
Start: 2025-08-05

## 2025-08-05 RX ORDER — MIDODRINE HYDROCHLORIDE 2.5 MG/1
2.5 TABLET ORAL 3 TIMES DAILY
Qty: 270 TABLET | Refills: 0 | Status: SHIPPED | OUTPATIENT
Start: 2025-08-05

## 2025-08-05 RX ORDER — METFORMIN HYDROCHLORIDE 500 MG/1
500 TABLET, EXTENDED RELEASE ORAL
Qty: 90 TABLET | Refills: 1 | Status: SHIPPED | OUTPATIENT
Start: 2025-08-05

## 2025-08-05 RX ORDER — METFORMIN HYDROCHLORIDE 500 MG/1
500 TABLET, EXTENDED RELEASE ORAL
Qty: 90 TABLET | Refills: 1 | Status: CANCELLED | OUTPATIENT
Start: 2025-08-05

## 2025-08-05 RX ORDER — DAPAGLIFLOZIN 5 MG/1
10 TABLET, FILM COATED ORAL EVERY MORNING
Qty: 60 TABLET | Refills: 0 | Status: CANCELLED | OUTPATIENT
Start: 2025-08-05

## 2025-08-05 RX ORDER — NYSTATIN 100000 [USP'U]/G
POWDER TOPICAL 2 TIMES DAILY
COMMUNITY
Start: 2025-07-30

## 2025-08-05 ASSESSMENT — PATIENT HEALTH QUESTIONNAIRE - PHQ9
2. FEELING DOWN, DEPRESSED OR HOPELESS: NOT AT ALL
1. LITTLE INTEREST OR PLEASURE IN DOING THINGS: MORE THAN HALF THE DAYS
SUM OF ALL RESPONSES TO PHQ QUESTIONS 1-9: 2

## (undated) DEVICE — PREMIUM DRY TRAY LF: Brand: MEDLINE INDUSTRIES, INC.

## (undated) DEVICE — MERCY HEALTH ST CHARLES: Brand: MEDLINE INDUSTRIES, INC.

## (undated) DEVICE — BLADE LARYNSCP SZ 4 TI DISP SPECTRM LOPRO GLIDESCOPE

## (undated) DEVICE — GLOVE ORANGE PI 7 1/2   MSG9075

## (undated) DEVICE — SUTURE CHROMIC GUT SZ 3-0 L27IN ABSRB BRN L26MM SH 1/2 CIR G122H

## (undated) DEVICE — SOLUTION SCRB 4OZ 4% CHG H2O AIDED FOR PREOPERATIVE SKIN

## (undated) DEVICE — PAD,ABDOMINAL,8"X7.5",ST,LF,20/BX: Brand: MEDLINE INDUSTRIES, INC.

## (undated) DEVICE — SHEET, T, LAPAROTOMY, STERILE: Brand: MEDLINE

## (undated) DEVICE — GAUZE,SPONGE,FLUFF,6"X6.75",STRL,5/TRAY: Brand: MEDLINE

## (undated) DEVICE — ST CHARLES MINOR ABDOMINAL PK: Brand: MEDLINE INDUSTRIES, INC.

## (undated) DEVICE — BANDAGE,GAUZE,BULKEE II,4.5"X4.1YD,STRL: Brand: MEDLINE

## (undated) DEVICE — GOWN,SIRUS,NONRNF,SETINSLV,XL,20/CS: Brand: MEDLINE

## (undated) DEVICE — SINGLE PORT MANIFOLD: Brand: NEPTUNE 2

## (undated) DEVICE — SOLUTION IRRIG 1000ML 0.9% SOD CHL USP POUR PLAS BTL

## (undated) DEVICE — GAUZE,PACKING STRIP,PLAIN,1/2"X5YD,STRL: Brand: CURAD